# Patient Record
Sex: FEMALE | Race: WHITE | NOT HISPANIC OR LATINO | Employment: OTHER | URBAN - METROPOLITAN AREA
[De-identification: names, ages, dates, MRNs, and addresses within clinical notes are randomized per-mention and may not be internally consistent; named-entity substitution may affect disease eponyms.]

---

## 2020-01-01 ENCOUNTER — TELEPHONE CONVERTED (OUTPATIENT)
Dept: PSYCHIATRY | Facility: CLINIC | Age: 60
End: 2020-01-01
Attending: STUDENT IN AN ORGANIZED HEALTH CARE EDUCATION/TRAINING PROGRAM

## 2020-01-01 ENCOUNTER — HOSPITAL ENCOUNTER (OUTPATIENT)
Dept: GENERAL RADIOLOGY | Facility: HOSPITAL | Age: 60
Discharge: HOME | End: 2020-11-02
Attending: UROLOGY

## 2020-01-01 ENCOUNTER — HOSPITAL ENCOUNTER (OUTPATIENT)
Dept: GENERAL RADIOLOGY | Facility: HOSPITAL | Age: 60
Discharge: HOME | End: 2020-10-19
Attending: UROLOGY

## 2020-01-01 ENCOUNTER — TELEPHONE CONVERTED (OUTPATIENT)
Dept: UROLOGY | Facility: CLINIC | Age: 60
End: 2020-01-01
Attending: UROLOGY

## 2020-01-01 ENCOUNTER — HOSPITAL ENCOUNTER (OUTPATIENT)
Dept: PREADMISSION TESTING | Facility: HOSPITAL | Age: 60
Discharge: HOME | End: 2020-09-30
Attending: UROLOGY

## 2020-01-01 ENCOUNTER — HOSPITAL ENCOUNTER (OUTPATIENT)
Dept: GENERAL RADIOLOGY | Facility: HOSPITAL | Age: 60
Discharge: HOME | End: 2020-10-05
Attending: UROLOGY

## 2020-01-01 ENCOUNTER — OFFICE VISIT CONVERTED (OUTPATIENT)
Dept: FAMILY MEDICINE CLINIC | Facility: CLINIC | Age: 60
End: 2020-01-01
Attending: STUDENT IN AN ORGANIZED HEALTH CARE EDUCATION/TRAINING PROGRAM

## 2020-01-01 ENCOUNTER — HOSPITAL ENCOUNTER (OUTPATIENT)
Dept: GENERAL RADIOLOGY | Facility: HOSPITAL | Age: 60
Discharge: HOME | End: 2020-12-31
Attending: STUDENT IN AN ORGANIZED HEALTH CARE EDUCATION/TRAINING PROGRAM

## 2020-01-01 ENCOUNTER — HOSPITAL ENCOUNTER (OUTPATIENT)
Dept: GENERAL RADIOLOGY | Facility: HOSPITAL | Age: 60
Discharge: HOME | End: 2020-10-26
Attending: UROLOGY

## 2020-01-01 ENCOUNTER — HOSPITAL ENCOUNTER (OUTPATIENT)
Dept: PERIOP | Facility: HOSPITAL | Age: 60
Setting detail: HOSPITAL OUTPATIENT SURGERY
Discharge: HOME | End: 2020-11-04
Attending: UROLOGY

## 2020-01-01 ENCOUNTER — HOSPITAL ENCOUNTER (OUTPATIENT)
Dept: PREADMISSION TESTING | Facility: HOSPITAL | Age: 60
Discharge: HOME | End: 2020-10-30
Attending: UROLOGY

## 2020-01-01 ENCOUNTER — HOSPITAL ENCOUNTER (OUTPATIENT)
Dept: OTHER | Facility: HOSPITAL | Age: 60
Discharge: HOME | End: 2020-10-28
Attending: UROLOGY

## 2020-01-01 ENCOUNTER — OFFICE VISIT CONVERTED (OUTPATIENT)
Dept: UROLOGY | Facility: CLINIC | Age: 60
End: 2020-01-01
Attending: UROLOGY

## 2020-01-01 ENCOUNTER — HOSPITAL ENCOUNTER (OUTPATIENT)
Dept: LAB | Facility: HOSPITAL | Age: 60
Discharge: HOME | End: 2020-09-30
Attending: NURSE PRACTITIONER

## 2020-01-01 ENCOUNTER — HOSPITAL ENCOUNTER (OUTPATIENT)
Dept: GENERAL RADIOLOGY | Facility: HOSPITAL | Age: 60
Discharge: HOME | End: 2020-10-12
Attending: UROLOGY

## 2020-01-01 ENCOUNTER — CONVERSION ENCOUNTER (OUTPATIENT)
Dept: FAMILY MEDICINE CLINIC | Facility: CLINIC | Age: 60
End: 2020-01-01

## 2020-01-01 ENCOUNTER — HOSPITAL ENCOUNTER (OUTPATIENT)
Dept: LAB | Facility: HOSPITAL | Age: 60
Discharge: HOME | End: 2020-12-31
Attending: STUDENT IN AN ORGANIZED HEALTH CARE EDUCATION/TRAINING PROGRAM

## 2020-01-01 LAB
25(OH)D3 SERPL-MCNC: 18.1 NG/ML (ref 30–100)
ALBUMIN SERPL-MCNC: 3.3 G/DL (ref 3.5–5)
ALBUMIN/GLOB SERPL: 1 {RATIO} (ref 1.4–2.6)
ALP SERPL-CCNC: 87 U/L (ref 53–141)
ALT SERPL-CCNC: 8 U/L (ref 10–40)
AMPHETAMINES UR QL SCN: NEGATIVE
ANION GAP SERPL CALC-SCNC: 14 MMOL/L (ref 8–19)
AST SERPL-CCNC: 17 U/L (ref 15–50)
BARBITURATES UR QL SCN: NEGATIVE
BASOPHILS # BLD AUTO: 0.05 10*3/UL (ref 0–0.2)
BASOPHILS # BLD AUTO: 0.06 10*3/UL (ref 0–0.2)
BASOPHILS NFR BLD AUTO: 0.6 % (ref 0–3)
BASOPHILS NFR BLD AUTO: 0.7 % (ref 0–3)
BENZODIAZ UR QL SCN: POSITIVE
BILIRUB SERPL-MCNC: 0.26 MG/DL (ref 0.2–1.3)
BUN SERPL-MCNC: 36 MG/DL (ref 5–25)
BUN/CREAT SERPL: 19 {RATIO} (ref 6–20)
BURR CELLS BLD QL SMEAR: SLIGHT
CALCIUM SERPL-MCNC: 8.9 MG/DL (ref 8.7–10.4)
CHLORIDE SERPL-SCNC: 105 MMOL/L (ref 99–111)
CHOLEST SERPL-MCNC: 111 MG/DL (ref 107–200)
CHOLEST/HDLC SERPL: 2.8 {RATIO} (ref 3–6)
CONV ABS IMM GRAN: 0.05 10*3/UL (ref 0–0.2)
CONV ABS IMM GRAN: 0.06 10*3/UL (ref 0–0.2)
CONV ANISOCYTES: SLIGHT
CONV BASOPHILIC STIPPLING IN BLOOD BY LIGHT MICROSCOPY: NORMAL
CONV CO2: 16 MMOL/L (ref 22–32)
CONV COCAINE, UR: NEGATIVE
CONV HIV-1/ HIV-2: NONREACTIVE
CONV HYPOCHROMIA IN BLOOD BY LIGHT MICROSCOPY: SLIGHT
CONV IMMATURE GRAN: 0.5 % (ref 0–1.8)
CONV IMMATURE GRAN: 0.9 % (ref 0–1.8)
CONV TOTAL PROTEIN: 6.7 G/DL (ref 6.3–8.2)
CREAT UR-MCNC: 1.91 MG/DL (ref 0.5–0.9)
DACRYOCYTES BLD QL SMEAR: NORMAL
DEPRECATED RDW RBC AUTO: 55.6 FL (ref 36.4–46.3)
DEPRECATED RDW RBC AUTO: 73.2 FL (ref 36.4–46.3)
EOSINOPHIL # BLD AUTO: 0.25 10*3/UL (ref 0–0.7)
EOSINOPHIL # BLD AUTO: 0.27 10*3/UL (ref 0–0.7)
EOSINOPHIL # BLD AUTO: 2.6 % (ref 0–7)
EOSINOPHIL # BLD AUTO: 3.9 % (ref 0–7)
ERYTHROCYTE [DISTWIDTH] IN BLOOD BY AUTOMATED COUNT: 16.1 % (ref 11.7–14.4)
ERYTHROCYTE [DISTWIDTH] IN BLOOD BY AUTOMATED COUNT: 20.4 % (ref 11.7–14.4)
FERRITIN SERPL-MCNC: 56 NG/ML (ref 10–200)
FOLATE SERPL-MCNC: 14.2 NG/ML (ref 4.8–20)
GFR SERPLBLD BASED ON 1.73 SQ M-ARVRAT: 28 ML/MIN/{1.73_M2}
GLOBULIN UR ELPH-MCNC: 3.4 G/DL (ref 2–3.5)
GLUCOSE SERPL-MCNC: 92 MG/DL (ref 65–99)
HCT VFR BLD AUTO: 25.8 % (ref 37–47)
HCT VFR BLD AUTO: 28.1 % (ref 37–47)
HDLC SERPL-MCNC: 39 MG/DL (ref 40–60)
HGB BLD-MCNC: 7.9 G/DL (ref 12–16)
HGB BLD-MCNC: 8.7 G/DL (ref 12–16)
IRON SATN MFR SERPL: 7 % (ref 20–55)
IRON SERPL-MCNC: 27 UG/DL (ref 60–170)
LDLC SERPL CALC-MCNC: 50 MG/DL (ref 70–100)
LYMPHOCYTES # BLD AUTO: 1.96 10*3/UL (ref 1–5)
LYMPHOCYTES # BLD AUTO: 2.14 10*3/UL (ref 1–5)
LYMPHOCYTES NFR BLD AUTO: 22.4 % (ref 20–45)
LYMPHOCYTES NFR BLD AUTO: 28.2 % (ref 20–45)
MACROCYTES BLD QL SMEAR: SLIGHT
MCH RBC QN AUTO: 28.8 PG (ref 27–31)
MCH RBC QN AUTO: 30.2 PG (ref 27–31)
MCHC RBC AUTO-ENTMCNC: 30.6 G/DL (ref 33–37)
MCHC RBC AUTO-ENTMCNC: 31 G/DL (ref 33–37)
MCV RBC AUTO: 94.2 FL (ref 81–99)
MCV RBC AUTO: 97.6 FL (ref 81–99)
METHADONE UR QL SCN: NEGATIVE
MICROCYTES BLD QL: SLIGHT
MONOCYTES # BLD AUTO: 0.74 10*3/UL (ref 0.2–1.2)
MONOCYTES # BLD AUTO: 1.16 10*3/UL (ref 0.2–1.2)
MONOCYTES NFR BLD AUTO: 10.7 % (ref 3–10)
MONOCYTES NFR BLD AUTO: 12.1 % (ref 3–10)
NEUTROPHILS # BLD AUTO: 3.86 10*3/UL (ref 2–8)
NEUTROPHILS # BLD AUTO: 5.9 10*3/UL (ref 2–8)
NEUTROPHILS NFR BLD AUTO: 55.6 % (ref 30–85)
NEUTROPHILS NFR BLD AUTO: 61.8 % (ref 30–85)
NRBC CBCN: 0 % (ref 0–0.7)
NRBC CBCN: 0 % (ref 0–0.7)
OPIATES TESTED UR SCN: POSITIVE
OSMOLALITY SERPL CALC.SUM OF ELEC: 280 MOSM/KG (ref 273–304)
OVALOCYTES BLD QL SMEAR: SLIGHT
OXYCODONE UR QL SCN: NEGATIVE
PCP UR QL: NEGATIVE
PLATELET # BLD AUTO: 183 10*3/UL (ref 130–400)
PLATELET # BLD AUTO: 281 10*3/UL (ref 130–400)
PMV BLD AUTO: 10.7 FL (ref 9.4–12.3)
PMV BLD AUTO: 9.3 FL (ref 9.4–12.3)
POIKILOCYTOSIS BLD QL SMEAR: SLIGHT
POLYCHROMASIA BLD QL SMEAR: NORMAL
POTASSIUM SERPL-SCNC: 3.6 MMOL/L (ref 3.5–5.3)
RBC # BLD AUTO: 2.74 10*6/UL (ref 4.2–5.4)
RBC # BLD AUTO: 2.88 10*6/UL (ref 4.2–5.4)
SARS-COV-2 RNA SPEC QL NAA+PROBE: NOT DETECTED
SARS-COV-2 RNA SPEC QL NAA+PROBE: NOT DETECTED
SODIUM SERPL-SCNC: 131 MMOL/L (ref 135–147)
TARGETS BLD QL SMEAR: NORMAL
THC SERPLBLD CFM-MCNC: NEGATIVE NG/ML
TIBC SERPL-MCNC: 403 UG/DL (ref 245–450)
TRANSFERRIN SERPL-MCNC: 282 MG/DL (ref 250–380)
TRIGL SERPL-MCNC: 112 MG/DL (ref 40–150)
TSH SERPL-ACNC: 3.34 M[IU]/L (ref 0.27–4.2)
VIT B12 SERPL-MCNC: 685 PG/ML (ref 211–911)
VLDLC SERPL-MCNC: 22 MG/DL (ref 5–37)
WBC # BLD AUTO: 6.94 10*3/UL (ref 4.8–10.8)
WBC # BLD AUTO: 9.56 10*3/UL (ref 4.8–10.8)

## 2020-08-20 ENCOUNTER — CONVERSION ENCOUNTER (OUTPATIENT)
Dept: FAMILY MEDICINE CLINIC | Facility: CLINIC | Age: 60
End: 2020-08-20

## 2020-08-20 ENCOUNTER — OFFICE VISIT CONVERTED (OUTPATIENT)
Dept: FAMILY MEDICINE CLINIC | Facility: CLINIC | Age: 60
End: 2020-08-20
Attending: NURSE PRACTITIONER

## 2020-09-01 ENCOUNTER — OFFICE VISIT CONVERTED (OUTPATIENT)
Dept: UROLOGY | Facility: CLINIC | Age: 60
End: 2020-09-01
Attending: NURSE PRACTITIONER

## 2020-09-02 ENCOUNTER — HOSPITAL ENCOUNTER (OUTPATIENT)
Dept: GENERAL RADIOLOGY | Facility: HOSPITAL | Age: 60
Discharge: HOME | End: 2020-09-02
Attending: INTERNAL MEDICINE

## 2020-09-02 ENCOUNTER — HOSPITAL ENCOUNTER (OUTPATIENT)
Dept: LAB | Facility: HOSPITAL | Age: 60
Discharge: HOME | End: 2020-09-02
Attending: NURSE PRACTITIONER

## 2020-09-02 LAB
25(OH)D3 SERPL-MCNC: 16 NG/ML (ref 30–100)
ALBUMIN SERPL-MCNC: 3.5 G/DL (ref 3.5–5)
ALBUMIN/GLOB SERPL: 1.4 {RATIO} (ref 1.4–2.6)
ALP SERPL-CCNC: 76 U/L (ref 53–141)
ALT SERPL-CCNC: <5 U/L (ref 10–40)
ANION GAP SERPL CALC-SCNC: 13 MMOL/L (ref 8–19)
AST SERPL-CCNC: 19 U/L (ref 15–50)
BASOPHILS # BLD AUTO: 0.04 10*3/UL (ref 0–0.2)
BASOPHILS NFR BLD AUTO: 0.7 % (ref 0–3)
BILIRUB SERPL-MCNC: 0.25 MG/DL (ref 0.2–1.3)
BUN SERPL-MCNC: 30 MG/DL (ref 5–25)
BUN/CREAT SERPL: 18 {RATIO} (ref 6–20)
CALCIUM SERPL-MCNC: 9.1 MG/DL (ref 8.7–10.4)
CHLORIDE SERPL-SCNC: 111 MMOL/L (ref 99–111)
CHOLEST SERPL-MCNC: 105 MG/DL (ref 107–200)
CHOLEST/HDLC SERPL: 2.6 {RATIO} (ref 3–6)
CONV ABS IMM GRAN: 0.05 10*3/UL (ref 0–0.2)
CONV CO2: 18 MMOL/L (ref 22–32)
CONV IMMATURE GRAN: 0.9 % (ref 0–1.8)
CONV TOTAL PROTEIN: 6 G/DL (ref 6.3–8.2)
CREAT UR-MCNC: 1.64 MG/DL (ref 0.5–0.9)
DEPRECATED RDW RBC AUTO: 54.1 FL (ref 36.4–46.3)
EOSINOPHIL # BLD AUTO: 0.25 10*3/UL (ref 0–0.7)
EOSINOPHIL # BLD AUTO: 4.5 % (ref 0–7)
ERYTHROCYTE [DISTWIDTH] IN BLOOD BY AUTOMATED COUNT: 14.9 % (ref 11.7–14.4)
GFR SERPLBLD BASED ON 1.73 SQ M-ARVRAT: 34 ML/MIN/{1.73_M2}
GLOBULIN UR ELPH-MCNC: 2.5 G/DL (ref 2–3.5)
GLUCOSE SERPL-MCNC: 85 MG/DL (ref 65–99)
HCT VFR BLD AUTO: 27.6 % (ref 37–47)
HDLC SERPL-MCNC: 41 MG/DL (ref 40–60)
HGB BLD-MCNC: 8.3 G/DL (ref 12–16)
LDLC SERPL CALC-MCNC: 50 MG/DL (ref 70–100)
LYMPHOCYTES # BLD AUTO: 1.8 10*3/UL (ref 1–5)
LYMPHOCYTES NFR BLD AUTO: 32.5 % (ref 20–45)
MCH RBC QN AUTO: 29.4 PG (ref 27–31)
MCHC RBC AUTO-ENTMCNC: 30.1 G/DL (ref 33–37)
MCV RBC AUTO: 97.9 FL (ref 81–99)
MONOCYTES # BLD AUTO: 0.66 10*3/UL (ref 0.2–1.2)
MONOCYTES NFR BLD AUTO: 11.9 % (ref 3–10)
NEUTROPHILS # BLD AUTO: 2.74 10*3/UL (ref 2–8)
NEUTROPHILS NFR BLD AUTO: 49.5 % (ref 30–85)
NRBC CBCN: 0 % (ref 0–0.7)
OSMOLALITY SERPL CALC.SUM OF ELEC: 293 MOSM/KG (ref 273–304)
PLATELET # BLD AUTO: 174 10*3/UL (ref 130–400)
PMV BLD AUTO: 10.8 FL (ref 9.4–12.3)
POTASSIUM SERPL-SCNC: 3.4 MMOL/L (ref 3.5–5.3)
RBC # BLD AUTO: 2.82 10*6/UL (ref 4.2–5.4)
SODIUM SERPL-SCNC: 139 MMOL/L (ref 135–147)
TRIGL SERPL-MCNC: 68 MG/DL (ref 40–150)
TSH SERPL-ACNC: 2.89 M[IU]/L (ref 0.27–4.2)
VLDLC SERPL-MCNC: 14 MG/DL (ref 5–37)
WBC # BLD AUTO: 5.54 10*3/UL (ref 4.8–10.8)

## 2020-09-03 ENCOUNTER — OFFICE VISIT CONVERTED (OUTPATIENT)
Dept: FAMILY MEDICINE CLINIC | Facility: CLINIC | Age: 60
End: 2020-09-03
Attending: NURSE PRACTITIONER

## 2020-09-03 ENCOUNTER — CONVERSION ENCOUNTER (OUTPATIENT)
Dept: FAMILY MEDICINE CLINIC | Facility: CLINIC | Age: 60
End: 2020-09-03

## 2020-09-03 LAB
EST. AVERAGE GLUCOSE BLD GHB EST-MCNC: 77 MG/DL
FOLATE SERPL-MCNC: 9.9 NG/ML (ref 4.8–20)
HBA1C MFR BLD: <4.3 % (ref 3.5–5.7)
VIT B12 SERPL-MCNC: 413 PG/ML (ref 211–911)

## 2020-09-04 LAB
FERRITIN SERPL-MCNC: 20 NG/ML (ref 10–200)
IRON SATN MFR SERPL: 11 % (ref 20–55)
IRON SERPL-MCNC: 54 UG/DL (ref 60–170)
TIBC SERPL-MCNC: 508 UG/DL (ref 245–450)
TRANSFERRIN SERPL-MCNC: 355 MG/DL (ref 250–380)

## 2020-09-10 ENCOUNTER — HOSPITAL ENCOUNTER (OUTPATIENT)
Dept: INFUSION THERAPY | Facility: HOSPITAL | Age: 60
Setting detail: RECURRING SERIES
Discharge: HOME | End: 2020-12-09
Attending: NURSE PRACTITIONER

## 2020-09-15 ENCOUNTER — HOSPITAL ENCOUNTER (OUTPATIENT)
Dept: CT IMAGING | Facility: HOSPITAL | Age: 60
Discharge: HOME | End: 2020-09-15
Attending: NURSE PRACTITIONER

## 2020-09-15 LAB
CREAT BLD-MCNC: 1.5 MG/DL (ref 0.6–1.4)
GFR SERPLBLD BASED ON 1.73 SQ M-ARVRAT: 38 ML/MIN/{1.73_M2}

## 2020-09-18 ENCOUNTER — OFFICE VISIT CONVERTED (OUTPATIENT)
Dept: UROLOGY | Facility: CLINIC | Age: 60
End: 2020-09-18
Attending: UROLOGY

## 2020-09-18 ENCOUNTER — HOSPITAL ENCOUNTER (OUTPATIENT)
Dept: SURGERY | Facility: CLINIC | Age: 60
Discharge: HOME | End: 2020-09-18
Attending: UROLOGY

## 2020-09-20 LAB — BACTERIA UR CULT: NORMAL

## 2021-01-01 ENCOUNTER — TELEMEDICINE CONVERTED (OUTPATIENT)
Dept: PSYCHIATRY | Facility: CLINIC | Age: 61
End: 2021-01-01
Attending: STUDENT IN AN ORGANIZED HEALTH CARE EDUCATION/TRAINING PROGRAM

## 2021-01-01 ENCOUNTER — ANESTHESIA EVENT (OUTPATIENT)
Dept: PERIOP | Facility: HOSPITAL | Age: 61
End: 2021-01-01

## 2021-01-01 ENCOUNTER — TELEPHONE (OUTPATIENT)
Dept: UROLOGY | Facility: CLINIC | Age: 61
End: 2021-01-01

## 2021-01-01 ENCOUNTER — TRANSCRIBE ORDERS (OUTPATIENT)
Dept: ADMINISTRATIVE | Facility: HOSPITAL | Age: 61
End: 2021-01-01

## 2021-01-01 ENCOUNTER — OFFICE VISIT CONVERTED (OUTPATIENT)
Dept: FAMILY MEDICINE CLINIC | Facility: CLINIC | Age: 61
End: 2021-01-01
Attending: STUDENT IN AN ORGANIZED HEALTH CARE EDUCATION/TRAINING PROGRAM

## 2021-01-01 ENCOUNTER — HOSPITAL ENCOUNTER (INPATIENT)
Facility: HOSPITAL | Age: 61
LOS: 10 days | End: 2021-09-22
Attending: EMERGENCY MEDICINE | Admitting: INTERNAL MEDICINE

## 2021-01-01 ENCOUNTER — ANESTHESIA (OUTPATIENT)
Dept: PERIOP | Facility: HOSPITAL | Age: 61
End: 2021-01-01

## 2021-01-01 ENCOUNTER — TELEPHONE CONVERTED (OUTPATIENT)
Dept: UROLOGY | Facility: CLINIC | Age: 61
End: 2021-01-01
Attending: UROLOGY

## 2021-01-01 ENCOUNTER — HOSPITAL ENCOUNTER (OUTPATIENT)
Dept: PREADMISSION TESTING | Facility: HOSPITAL | Age: 61
Discharge: HOME | End: 2021-02-04
Attending: UROLOGY

## 2021-01-01 ENCOUNTER — APPOINTMENT (OUTPATIENT)
Dept: GENERAL RADIOLOGY | Facility: HOSPITAL | Age: 61
End: 2021-01-01

## 2021-01-01 ENCOUNTER — TELEPHONE (OUTPATIENT)
Dept: FAMILY MEDICINE CLINIC | Facility: CLINIC | Age: 61
End: 2021-01-01

## 2021-01-01 ENCOUNTER — APPOINTMENT (OUTPATIENT)
Dept: CT IMAGING | Facility: HOSPITAL | Age: 61
End: 2021-01-01

## 2021-01-01 ENCOUNTER — OFFICE VISIT CONVERTED (OUTPATIENT)
Dept: UROLOGY | Facility: CLINIC | Age: 61
End: 2021-01-01
Attending: UROLOGY

## 2021-01-01 ENCOUNTER — ANESTHESIA (OUTPATIENT)
Dept: ICU | Facility: HOSPITAL | Age: 61
End: 2021-01-01

## 2021-01-01 ENCOUNTER — HOSPITAL ENCOUNTER (OUTPATIENT)
Dept: PREADMISSION TESTING | Facility: HOSPITAL | Age: 61
Discharge: HOME | End: 2021-02-19
Attending: UROLOGY

## 2021-01-01 ENCOUNTER — TRANSCRIBE ORDERS (OUTPATIENT)
Dept: LAB | Facility: HOSPITAL | Age: 61
End: 2021-01-01

## 2021-01-01 ENCOUNTER — OFFICE VISIT (OUTPATIENT)
Dept: FAMILY MEDICINE CLINIC | Facility: CLINIC | Age: 61
End: 2021-01-01

## 2021-01-01 ENCOUNTER — TRANSITIONAL CARE MANAGEMENT TELEPHONE ENCOUNTER (OUTPATIENT)
Dept: CALL CENTER | Facility: HOSPITAL | Age: 61
End: 2021-01-01

## 2021-01-01 ENCOUNTER — HOSPITAL ENCOUNTER (OUTPATIENT)
Dept: PREADMISSION TESTING | Facility: HOSPITAL | Age: 61
Discharge: HOME | End: 2021-02-06
Attending: UROLOGY

## 2021-01-01 ENCOUNTER — HOSPITAL ENCOUNTER (OUTPATIENT)
Facility: HOSPITAL | Age: 61
Setting detail: OBSERVATION
Discharge: HOME | End: 2021-07-28
Attending: EMERGENCY MEDICINE | Admitting: INTERNAL MEDICINE

## 2021-01-01 ENCOUNTER — HOSPITAL ENCOUNTER (OUTPATIENT)
Dept: PERIOP | Facility: HOSPITAL | Age: 61
Setting detail: HOSPITAL OUTPATIENT SURGERY
Discharge: HOME | End: 2021-02-24
Attending: UROLOGY

## 2021-01-01 ENCOUNTER — APPOINTMENT (OUTPATIENT)
Dept: BONE DENSITY | Facility: HOSPITAL | Age: 61
End: 2021-01-01

## 2021-01-01 ENCOUNTER — HOSPITAL ENCOUNTER (OUTPATIENT)
Dept: SURGERY | Facility: CLINIC | Age: 61
Discharge: HOME | End: 2021-01-25
Attending: UROLOGY

## 2021-01-01 ENCOUNTER — HOSPITAL ENCOUNTER (OUTPATIENT)
Dept: PREADMISSION TESTING | Facility: HOSPITAL | Age: 61
Discharge: HOME | End: 2021-05-24
Attending: UROLOGY

## 2021-01-01 ENCOUNTER — ANESTHESIA EVENT (OUTPATIENT)
Dept: ICU | Facility: HOSPITAL | Age: 61
End: 2021-01-01

## 2021-01-01 ENCOUNTER — HOSPITAL ENCOUNTER (OUTPATIENT)
Dept: URGENT CARE | Facility: CLINIC | Age: 61
Discharge: HOME | End: 2021-04-29
Attending: PHYSICIAN ASSISTANT

## 2021-01-01 ENCOUNTER — HOSPITAL ENCOUNTER (OUTPATIENT)
Facility: HOSPITAL | Age: 61
Setting detail: HOSPITAL OUTPATIENT SURGERY
Discharge: HOME | End: 2021-06-23
Attending: UROLOGY | Admitting: UROLOGY

## 2021-01-01 ENCOUNTER — LAB (OUTPATIENT)
Dept: LAB | Facility: HOSPITAL | Age: 61
End: 2021-01-01

## 2021-01-01 ENCOUNTER — OFFICE VISIT (OUTPATIENT)
Dept: PSYCHIATRY | Facility: CLINIC | Age: 61
End: 2021-01-01

## 2021-01-01 ENCOUNTER — READMISSION MANAGEMENT (OUTPATIENT)
Dept: CALL CENTER | Facility: HOSPITAL | Age: 61
End: 2021-01-01

## 2021-01-01 ENCOUNTER — HOSPITAL ENCOUNTER (OUTPATIENT)
Dept: OTHER | Facility: HOSPITAL | Age: 61
Discharge: HOME | End: 2021-05-24
Attending: UROLOGY

## 2021-01-01 VITALS
SYSTOLIC BLOOD PRESSURE: 98 MMHG | HEIGHT: 64 IN | RESPIRATION RATE: 20 BRPM | TEMPERATURE: 95.9 F | BODY MASS INDEX: 19.04 KG/M2 | OXYGEN SATURATION: 98 % | DIASTOLIC BLOOD PRESSURE: 50 MMHG | HEART RATE: 94 BPM | WEIGHT: 111.5 LBS

## 2021-01-01 VITALS
DIASTOLIC BLOOD PRESSURE: 44 MMHG | SYSTOLIC BLOOD PRESSURE: 107 MMHG | HEART RATE: 80 BPM | HEIGHT: 64 IN | BODY MASS INDEX: 18.17 KG/M2 | WEIGHT: 106.44 LBS | OXYGEN SATURATION: 97 % | TEMPERATURE: 97 F

## 2021-01-01 VITALS
HEART RATE: 93 BPM | WEIGHT: 116 LBS | DIASTOLIC BLOOD PRESSURE: 57 MMHG | BODY MASS INDEX: 19.81 KG/M2 | OXYGEN SATURATION: 95 % | SYSTOLIC BLOOD PRESSURE: 93 MMHG | TEMPERATURE: 96 F | HEIGHT: 64 IN

## 2021-01-01 VITALS — BODY MASS INDEX: 18.69 KG/M2 | WEIGHT: 109.5 LBS | RESPIRATION RATE: 15 BRPM | HEIGHT: 64 IN

## 2021-01-01 VITALS
WEIGHT: 129 LBS | HEIGHT: 64 IN | BODY MASS INDEX: 22.02 KG/M2 | SYSTOLIC BLOOD PRESSURE: 112 MMHG | DIASTOLIC BLOOD PRESSURE: 50 MMHG

## 2021-01-01 VITALS
BODY MASS INDEX: 20.03 KG/M2 | HEIGHT: 64 IN | SYSTOLIC BLOOD PRESSURE: 110 MMHG | WEIGHT: 117.31 LBS | OXYGEN SATURATION: 96 % | RESPIRATION RATE: 16 BRPM | DIASTOLIC BLOOD PRESSURE: 50 MMHG | TEMPERATURE: 96 F | HEART RATE: 82 BPM

## 2021-01-01 VITALS
HEIGHT: 64 IN | OXYGEN SATURATION: 98 % | RESPIRATION RATE: 16 BRPM | BODY MASS INDEX: 22.13 KG/M2 | HEART RATE: 68 BPM | WEIGHT: 129.63 LBS | SYSTOLIC BLOOD PRESSURE: 91 MMHG | TEMPERATURE: 97.4 F | DIASTOLIC BLOOD PRESSURE: 46 MMHG

## 2021-01-01 VITALS
WEIGHT: 134.04 LBS | HEIGHT: 64 IN | RESPIRATION RATE: 18 BRPM | TEMPERATURE: 98.2 F | HEART RATE: 91 BPM | BODY MASS INDEX: 22.88 KG/M2 | OXYGEN SATURATION: 99 % | SYSTOLIC BLOOD PRESSURE: 121 MMHG | DIASTOLIC BLOOD PRESSURE: 60 MMHG

## 2021-01-01 VITALS
BODY MASS INDEX: 18.53 KG/M2 | SYSTOLIC BLOOD PRESSURE: 95 MMHG | TEMPERATURE: 96.5 F | HEIGHT: 64 IN | WEIGHT: 108.56 LBS | OXYGEN SATURATION: 91 % | DIASTOLIC BLOOD PRESSURE: 56 MMHG

## 2021-01-01 VITALS
TEMPERATURE: 97.6 F | BODY MASS INDEX: 20.83 KG/M2 | HEART RATE: 76 BPM | HEIGHT: 64 IN | DIASTOLIC BLOOD PRESSURE: 54 MMHG | RESPIRATION RATE: 16 BRPM | OXYGEN SATURATION: 96 % | SYSTOLIC BLOOD PRESSURE: 98 MMHG | WEIGHT: 122 LBS

## 2021-01-01 VITALS
HEART RATE: 83 BPM | SYSTOLIC BLOOD PRESSURE: 100 MMHG | OXYGEN SATURATION: 100 % | WEIGHT: 113.12 LBS | RESPIRATION RATE: 20 BRPM | HEIGHT: 64 IN | DIASTOLIC BLOOD PRESSURE: 42 MMHG | BODY MASS INDEX: 19.31 KG/M2

## 2021-01-01 VITALS
DIASTOLIC BLOOD PRESSURE: 60 MMHG | HEIGHT: 64 IN | TEMPERATURE: 97.6 F | WEIGHT: 127 LBS | RESPIRATION RATE: 16 BRPM | BODY MASS INDEX: 21.74 KG/M2 | WEIGHT: 127.31 LBS | HEIGHT: 64 IN | OXYGEN SATURATION: 96 % | HEART RATE: 88 BPM | SYSTOLIC BLOOD PRESSURE: 118 MMHG | BODY MASS INDEX: 21.68 KG/M2 | RESPIRATION RATE: 17 BRPM

## 2021-01-01 VITALS
OXYGEN SATURATION: 97 % | HEART RATE: 83 BPM | DIASTOLIC BLOOD PRESSURE: 49 MMHG | BODY MASS INDEX: 18.1 KG/M2 | TEMPERATURE: 95.7 F | SYSTOLIC BLOOD PRESSURE: 87 MMHG | WEIGHT: 106 LBS | HEIGHT: 64 IN

## 2021-01-01 VITALS — HEIGHT: 64 IN | BODY MASS INDEX: 18.44 KG/M2 | RESPIRATION RATE: 18 BRPM | WEIGHT: 108 LBS

## 2021-01-01 VITALS
BODY MASS INDEX: 22.51 KG/M2 | WEIGHT: 131.84 LBS | HEART RATE: 104 BPM | OXYGEN SATURATION: 59 % | HEIGHT: 64 IN | TEMPERATURE: 96.6 F

## 2021-01-01 DIAGNOSIS — R26.2 DIFFICULTY WALKING: ICD-10-CM

## 2021-01-01 DIAGNOSIS — E86.0 DEHYDRATION: ICD-10-CM

## 2021-01-01 DIAGNOSIS — F17.200 SMOKER: ICD-10-CM

## 2021-01-01 DIAGNOSIS — D64.9 ANEMIA, UNSPECIFIED TYPE: ICD-10-CM

## 2021-01-01 DIAGNOSIS — K43.5 PARASTOMAL HERNIA WITHOUT OBSTRUCTION OR GANGRENE: ICD-10-CM

## 2021-01-01 DIAGNOSIS — Z78.0 POST-MENOPAUSAL: ICD-10-CM

## 2021-01-01 DIAGNOSIS — Z98.890 HISTORY OF UROSTOMY: ICD-10-CM

## 2021-01-01 DIAGNOSIS — A41.9 SEPTIC SHOCK (HCC): ICD-10-CM

## 2021-01-01 DIAGNOSIS — R65.21 SEPTIC SHOCK (HCC): ICD-10-CM

## 2021-01-01 DIAGNOSIS — F41.1 GENERALIZED ANXIETY DISORDER: ICD-10-CM

## 2021-01-01 DIAGNOSIS — F41.1 GENERALIZED ANXIETY DISORDER: Primary | ICD-10-CM

## 2021-01-01 DIAGNOSIS — F17.200 CURRENT SMOKER: Primary | ICD-10-CM

## 2021-01-01 DIAGNOSIS — N18.32 STAGE 3B CHRONIC KIDNEY DISEASE (HCC): ICD-10-CM

## 2021-01-01 DIAGNOSIS — Z74.09 DECLINING MOBILITY: ICD-10-CM

## 2021-01-01 DIAGNOSIS — Z78.9 DECREASED ACTIVITIES OF DAILY LIVING (ADL): ICD-10-CM

## 2021-01-01 DIAGNOSIS — R29.6 FREQUENT FALLS: ICD-10-CM

## 2021-01-01 DIAGNOSIS — Z01.818 PREOP TESTING: Primary | ICD-10-CM

## 2021-01-01 DIAGNOSIS — N30.00 ACUTE CYSTITIS WITHOUT HEMATURIA: Primary | ICD-10-CM

## 2021-01-01 DIAGNOSIS — N30.00 ACUTE CYSTITIS WITHOUT HEMATURIA: ICD-10-CM

## 2021-01-01 DIAGNOSIS — E87.29 METABOLIC ACIDOSIS, INCREASED ANION GAP (IAG): ICD-10-CM

## 2021-01-01 DIAGNOSIS — F51.05 INSOMNIA DUE TO MENTAL CONDITION: Primary | ICD-10-CM

## 2021-01-01 DIAGNOSIS — R42 VERTIGO: ICD-10-CM

## 2021-01-01 DIAGNOSIS — F33.1 MAJOR DEPRESSIVE DISORDER, RECURRENT EPISODE, MODERATE (HCC): Primary | ICD-10-CM

## 2021-01-01 DIAGNOSIS — R41.0 CONFUSION: ICD-10-CM

## 2021-01-01 DIAGNOSIS — N17.9 ACUTE KIDNEY INJURY (HCC): ICD-10-CM

## 2021-01-01 DIAGNOSIS — E87.5 HYPERKALEMIA: ICD-10-CM

## 2021-01-01 DIAGNOSIS — N39.0 URINARY TRACT INFECTION WITHOUT HEMATURIA, SITE UNSPECIFIED: Primary | ICD-10-CM

## 2021-01-01 DIAGNOSIS — N28.9 RENAL INSUFFICIENCY: ICD-10-CM

## 2021-01-01 DIAGNOSIS — F51.05 INSOMNIA DUE TO MENTAL CONDITION: ICD-10-CM

## 2021-01-01 DIAGNOSIS — R26.9 GAIT ABNORMALITY: ICD-10-CM

## 2021-01-01 DIAGNOSIS — E87.1 HYPONATREMIA: ICD-10-CM

## 2021-01-01 DIAGNOSIS — J44.9 CHRONIC OBSTRUCTIVE PULMONARY DISEASE, UNSPECIFIED COPD TYPE (HCC): ICD-10-CM

## 2021-01-01 DIAGNOSIS — F33.1 MAJOR DEPRESSIVE DISORDER, RECURRENT EPISODE, MODERATE (HCC): ICD-10-CM

## 2021-01-01 DIAGNOSIS — N39.0 URINARY TRACT INFECTION IN FEMALE: Primary | ICD-10-CM

## 2021-01-01 DIAGNOSIS — Z01.818 PREOP TESTING: ICD-10-CM

## 2021-01-01 DIAGNOSIS — R53.1 GENERALIZED WEAKNESS: ICD-10-CM

## 2021-01-01 DIAGNOSIS — K56.609 SMALL BOWEL OBSTRUCTION (HCC): ICD-10-CM

## 2021-01-01 LAB
027 TOXIN: NORMAL
ALBUMIN SERPL-MCNC: 1.6 G/DL (ref 3.5–5.2)
ALBUMIN SERPL-MCNC: 1.7 G/DL (ref 3.5–5.2)
ALBUMIN SERPL-MCNC: 1.9 G/DL (ref 3.5–5.2)
ALBUMIN SERPL-MCNC: 1.9 G/DL (ref 3.5–5.2)
ALBUMIN SERPL-MCNC: 2.3 G/DL (ref 3.5–5.2)
ALBUMIN SERPL-MCNC: 2.3 G/DL (ref 3.5–5.2)
ALBUMIN SERPL-MCNC: 2.4 G/DL (ref 3.5–5.2)
ALBUMIN SERPL-MCNC: 2.5 G/DL (ref 3.5–5.2)
ALBUMIN SERPL-MCNC: 2.6 G/DL (ref 3.5–5.2)
ALBUMIN SERPL-MCNC: 2.8 G/DL (ref 3.5–5.2)
ALBUMIN SERPL-MCNC: 3.4 G/DL (ref 3.5–5.2)
ALBUMIN SERPL-MCNC: 4.1 G/DL (ref 3.5–5.2)
ALBUMIN/GLOB SERPL: 0.5 G/DL
ALBUMIN/GLOB SERPL: 0.6 G/DL
ALBUMIN/GLOB SERPL: 0.6 G/DL
ALBUMIN/GLOB SERPL: 0.7 G/DL
ALBUMIN/GLOB SERPL: 0.8 G/DL
ALBUMIN/GLOB SERPL: 0.9 G/DL
ALBUMIN/GLOB SERPL: 0.9 G/DL
ALP SERPL-CCNC: 101 U/L (ref 39–117)
ALP SERPL-CCNC: 111 U/L (ref 39–117)
ALP SERPL-CCNC: 114 U/L (ref 39–117)
ALP SERPL-CCNC: 126 U/L (ref 39–117)
ALP SERPL-CCNC: 162 U/L (ref 39–117)
ALP SERPL-CCNC: 64 U/L (ref 39–117)
ALP SERPL-CCNC: 70 U/L (ref 39–117)
ALP SERPL-CCNC: 74 U/L (ref 39–117)
ALP SERPL-CCNC: 78 U/L (ref 39–117)
ALP SERPL-CCNC: 90 U/L (ref 39–117)
ALP SERPL-CCNC: 96 U/L (ref 39–117)
ALP SERPL-CCNC: 97 U/L (ref 39–117)
ALT SERPL W P-5'-P-CCNC: 10 U/L (ref 1–33)
ALT SERPL W P-5'-P-CCNC: 11 U/L (ref 1–33)
ALT SERPL W P-5'-P-CCNC: 14 U/L (ref 1–33)
ALT SERPL W P-5'-P-CCNC: 14 U/L (ref 1–33)
ALT SERPL W P-5'-P-CCNC: 15 U/L (ref 1–33)
ALT SERPL W P-5'-P-CCNC: 17 U/L (ref 1–33)
ALT SERPL W P-5'-P-CCNC: 18 U/L (ref 1–33)
ALT SERPL W P-5'-P-CCNC: 21 U/L (ref 1–33)
ALT SERPL W P-5'-P-CCNC: 21 U/L (ref 1–33)
ALT SERPL W P-5'-P-CCNC: 9 U/L (ref 1–33)
AMORPH URATE CRY URNS QL MICRO: ABNORMAL /HPF
AMPICILLIN SUSC ISLT: 16
AMPICILLIN SUSC ISLT: <=0.25
AMPICILLIN SUSC ISLT: >=32
AMPICILLIN+SULBAC SUSC ISLT: 8
AMPICILLIN+SULBAC SUSC ISLT: >=32
ANION GAP SERPL CALCULATED.3IONS-SCNC: 10.1 MMOL/L (ref 5–15)
ANION GAP SERPL CALCULATED.3IONS-SCNC: 11.8 MMOL/L (ref 5–15)
ANION GAP SERPL CALCULATED.3IONS-SCNC: 12.2 MMOL/L (ref 5–15)
ANION GAP SERPL CALCULATED.3IONS-SCNC: 12.8 MMOL/L (ref 5–15)
ANION GAP SERPL CALCULATED.3IONS-SCNC: 13 MMOL/L (ref 5–15)
ANION GAP SERPL CALCULATED.3IONS-SCNC: 13.4 MMOL/L (ref 5–15)
ANION GAP SERPL CALCULATED.3IONS-SCNC: 13.4 MMOL/L (ref 5–15)
ANION GAP SERPL CALCULATED.3IONS-SCNC: 13.9 MMOL/L (ref 5–15)
ANION GAP SERPL CALCULATED.3IONS-SCNC: 14.3 MMOL/L (ref 5–15)
ANION GAP SERPL CALCULATED.3IONS-SCNC: 14.6 MMOL/L (ref 5–15)
ANION GAP SERPL CALCULATED.3IONS-SCNC: 14.8 MMOL/L (ref 5–15)
ANION GAP SERPL CALCULATED.3IONS-SCNC: 16 MMOL/L (ref 5–15)
ANION GAP SERPL CALCULATED.3IONS-SCNC: 16.6 MMOL/L (ref 5–15)
ANION GAP SERPL CALCULATED.3IONS-SCNC: 17.6 MMOL/L (ref 5–15)
ANION GAP SERPL CALCULATED.3IONS-SCNC: 21 MMOL/L (ref 5–15)
ANION GAP SERPL CALCULATED.3IONS-SCNC: 9.8 MMOL/L (ref 5–15)
ANISOCYTOSIS BLD QL: ABNORMAL
ANISOCYTOSIS BLD QL: NORMAL
ARTERIAL PATENCY WRIST A: ABNORMAL
ARTERIAL PATENCY WRIST A: ABNORMAL
ARTERIAL PATENCY WRIST A: POSITIVE
AST SERPL-CCNC: 15 U/L (ref 1–32)
AST SERPL-CCNC: 16 U/L (ref 1–32)
AST SERPL-CCNC: 22 U/L (ref 1–32)
AST SERPL-CCNC: 22 U/L (ref 1–32)
AST SERPL-CCNC: 26 U/L (ref 1–32)
AST SERPL-CCNC: 30 U/L (ref 1–32)
AST SERPL-CCNC: 36 U/L (ref 1–32)
AST SERPL-CCNC: 48 U/L (ref 1–32)
AST SERPL-CCNC: 52 U/L (ref 1–32)
AST SERPL-CCNC: 55 U/L (ref 1–32)
AST SERPL-CCNC: 55 U/L (ref 1–32)
AST SERPL-CCNC: 63 U/L (ref 1–32)
BACTERIA SPEC AEROBE CULT: ABNORMAL
BACTERIA SPEC AEROBE CULT: ABNORMAL
BACTERIA SPEC AEROBE CULT: NORMAL
BACTERIA UR CULT: ABNORMAL
BACTERIA UR QL AUTO: ABNORMAL /HPF
BACTERIA UR QL AUTO: ABNORMAL /HPF
BASE EXCESS BLDA CALC-SCNC: -10.3 MMOL/L (ref -2–2)
BASE EXCESS BLDA CALC-SCNC: -17.5 MMOL/L (ref -2–2)
BASE EXCESS BLDA CALC-SCNC: -19.3 MMOL/L (ref -2–2)
BASOPHILS # BLD AUTO: 0.01 10*3/MM3 (ref 0–0.2)
BASOPHILS # BLD AUTO: 0.03 10*3/MM3 (ref 0–0.2)
BASOPHILS # BLD AUTO: 0.04 10*3/MM3 (ref 0–0.2)
BASOPHILS # BLD AUTO: 0.05 10*3/MM3 (ref 0–0.2)
BASOPHILS # BLD AUTO: 0.06 10*3/MM3 (ref 0–0.2)
BASOPHILS # BLD AUTO: 0.09 10*3/MM3 (ref 0–0.2)
BASOPHILS # BLD AUTO: 0.11 10*3/MM3 (ref 0–0.2)
BASOPHILS NFR BLD AUTO: 0.1 % (ref 0–1.5)
BASOPHILS NFR BLD AUTO: 0.3 % (ref 0–1.5)
BASOPHILS NFR BLD AUTO: 0.4 % (ref 0–1.5)
BASOPHILS NFR BLD AUTO: 0.7 % (ref 0–1.5)
BDY SITE: ABNORMAL
BILIRUB BLD-MCNC: NEGATIVE MG/DL
BILIRUB CONJ SERPL-MCNC: 0.4 MG/DL (ref 0–0.3)
BILIRUB INDIRECT SERPL-MCNC: 0.2 MG/DL
BILIRUB INDIRECT SERPL-MCNC: 0.2 MG/DL
BILIRUB INDIRECT SERPL-MCNC: 0.3 MG/DL
BILIRUB SERPL-MCNC: 0.4 MG/DL (ref 0–1.2)
BILIRUB SERPL-MCNC: 0.5 MG/DL (ref 0–1.2)
BILIRUB SERPL-MCNC: 0.6 MG/DL (ref 0–1.2)
BILIRUB SERPL-MCNC: 0.6 MG/DL (ref 0–1.2)
BILIRUB SERPL-MCNC: 0.7 MG/DL (ref 0–1.2)
BILIRUB SERPL-MCNC: 0.7 MG/DL (ref 0–1.2)
BILIRUB SERPL-MCNC: 0.8 MG/DL (ref 0–1.2)
BILIRUB SERPL-MCNC: 0.9 MG/DL (ref 0–1.2)
BILIRUB SERPL-MCNC: 1 MG/DL (ref 0–1.2)
BILIRUB SERPL-MCNC: 1.3 MG/DL (ref 0–1.2)
BILIRUB UR QL STRIP: NEGATIVE
BILIRUB UR QL STRIP: NEGATIVE
BUN SERPL-MCNC: 26 MG/DL (ref 6–20)
BUN SERPL-MCNC: 27 MG/DL (ref 6–20)
BUN SERPL-MCNC: 31 MG/DL (ref 6–20)
BUN SERPL-MCNC: 31 MG/DL (ref 6–20)
BUN SERPL-MCNC: 32 MG/DL (ref 6–20)
BUN SERPL-MCNC: 33 MG/DL (ref 6–20)
BUN SERPL-MCNC: 38 MG/DL (ref 6–20)
BUN SERPL-MCNC: 39 MG/DL (ref 6–20)
BUN SERPL-MCNC: 40 MG/DL (ref 6–20)
BUN SERPL-MCNC: 42 MG/DL (ref 6–20)
BUN SERPL-MCNC: 50 MG/DL (ref 6–20)
BUN SERPL-MCNC: 60 MG/DL (ref 6–20)
BUN SERPL-MCNC: 73 MG/DL (ref 6–20)
BUN SERPL-MCNC: 75 MG/DL (ref 6–20)
BUN SERPL-MCNC: 76 MG/DL (ref 6–20)
BUN SERPL-MCNC: 78 MG/DL (ref 6–20)
BUN/CREAT SERPL: 16 (ref 7–25)
BUN/CREAT SERPL: 20.3 (ref 7–25)
BUN/CREAT SERPL: 22.1 (ref 7–25)
BUN/CREAT SERPL: 22.4 (ref 7–25)
BUN/CREAT SERPL: 25.5 (ref 7–25)
BUN/CREAT SERPL: 27 (ref 7–25)
BUN/CREAT SERPL: 28.1 (ref 7–25)
BUN/CREAT SERPL: 31.1 (ref 7–25)
BUN/CREAT SERPL: 33 (ref 7–25)
BUN/CREAT SERPL: 34.8 (ref 7–25)
BUN/CREAT SERPL: 36.4 (ref 7–25)
BUN/CREAT SERPL: 37.1 (ref 7–25)
BUN/CREAT SERPL: 37.8 (ref 7–25)
BUN/CREAT SERPL: 39.6 (ref 7–25)
BUN/CREAT SERPL: 40.5 (ref 7–25)
BUN/CREAT SERPL: 43.5 (ref 7–25)
BURR CELLS BLD QL SMEAR: NORMAL
C DIFF TOX GENS STL QL NAA+PROBE: NEGATIVE
CA-I BLDA-SCNC: 0.99 MMOL/L (ref 1.13–1.32)
CA-I BLDA-SCNC: 1 MMOL/L (ref 1.13–1.32)
CA-I BLDA-SCNC: 1 MMOL/L (ref 1.13–1.32)
CA-I BLDA-SCNC: 1.02 MMOL/L (ref 1.13–1.32)
CA-I BLDA-SCNC: 1.08 MMOL/L (ref 1.13–1.32)
CA-I BLDA-SCNC: 1.09 MMOL/L (ref 1.13–1.32)
CALCIUM SPEC-SCNC: 10 MG/DL (ref 8.6–10.5)
CALCIUM SPEC-SCNC: 6.8 MG/DL (ref 8.6–10.5)
CALCIUM SPEC-SCNC: 7 MG/DL (ref 8.6–10.5)
CALCIUM SPEC-SCNC: 7.1 MG/DL (ref 8.6–10.5)
CALCIUM SPEC-SCNC: 7.9 MG/DL (ref 8.6–10.5)
CALCIUM SPEC-SCNC: 7.9 MG/DL (ref 8.6–10.5)
CALCIUM SPEC-SCNC: 8 MG/DL (ref 8.6–10.5)
CALCIUM SPEC-SCNC: 8.3 MG/DL (ref 8.6–10.5)
CALCIUM SPEC-SCNC: 8.4 MG/DL (ref 8.6–10.5)
CALCIUM SPEC-SCNC: 8.6 MG/DL (ref 8.6–10.5)
CALCIUM SPEC-SCNC: 8.6 MG/DL (ref 8.6–10.5)
CALCIUM SPEC-SCNC: 8.7 MG/DL (ref 8.6–10.5)
CALCIUM SPEC-SCNC: 8.9 MG/DL (ref 8.6–10.5)
CALCIUM SPEC-SCNC: 9 MG/DL (ref 8.6–10.5)
CALCIUM SPEC-SCNC: 9.1 MG/DL (ref 8.6–10.5)
CALCIUM SPEC-SCNC: 9.7 MG/DL (ref 8.6–10.5)
CEFAZOLIN SUSC ISLT: <=4
CEFEPIME SUSC ISLT: <=0.12
CEFOTAXIME SUSC ISLT: <=0.12
CEFTAZIDIME SUSC ISLT: <=1
CEFTRIAXONE SUSC ISLT: <=0.12
CEFTRIAXONE SUSC ISLT: <=0.25
CEFTRIAXONE SUSC ISLT: <=0.25
CHLORIDE BLDA-SCNC: 118 MMOL/L (ref 98–106)
CHLORIDE BLDA-SCNC: 122 MMOL/L (ref 98–106)
CHLORIDE SERPL-SCNC: 100 MMOL/L (ref 98–107)
CHLORIDE SERPL-SCNC: 101 MMOL/L (ref 98–107)
CHLORIDE SERPL-SCNC: 102 MMOL/L (ref 98–107)
CHLORIDE SERPL-SCNC: 103 MMOL/L (ref 98–107)
CHLORIDE SERPL-SCNC: 104 MMOL/L (ref 98–107)
CHLORIDE SERPL-SCNC: 108 MMOL/L (ref 98–107)
CHLORIDE SERPL-SCNC: 110 MMOL/L (ref 98–107)
CHLORIDE SERPL-SCNC: 114 MMOL/L (ref 98–107)
CHLORIDE SERPL-SCNC: 118 MMOL/L (ref 98–107)
CHLORIDE SERPL-SCNC: 120 MMOL/L (ref 98–107)
CHLORIDE SERPL-SCNC: 96 MMOL/L (ref 98–107)
CHOLEST SERPL-MCNC: 87 MG/DL (ref 0–200)
CIPROFLOXACIN SUSC ISLT: <=0.25
CK SERPL-CCNC: 90 U/L (ref 20–180)
CLARITY UR: ABNORMAL
CLARITY UR: ABNORMAL
CLARITY, POC: ABNORMAL
CLUMPED PLATELETS: PRESENT
CLUMPED PLATELETS: PRESENT
CO2 SERPL-SCNC: 10 MMOL/L (ref 22–29)
CO2 SERPL-SCNC: 11.2 MMOL/L (ref 22–29)
CO2 SERPL-SCNC: 12 MMOL/L (ref 22–29)
CO2 SERPL-SCNC: 14 MMOL/L (ref 22–29)
CO2 SERPL-SCNC: 14.2 MMOL/L (ref 22–29)
CO2 SERPL-SCNC: 14.6 MMOL/L (ref 22–29)
CO2 SERPL-SCNC: 15.6 MMOL/L (ref 22–29)
CO2 SERPL-SCNC: 16.2 MMOL/L (ref 22–29)
CO2 SERPL-SCNC: 16.4 MMOL/L (ref 22–29)
CO2 SERPL-SCNC: 16.7 MMOL/L (ref 22–29)
CO2 SERPL-SCNC: 17.4 MMOL/L (ref 22–29)
CO2 SERPL-SCNC: 17.4 MMOL/L (ref 22–29)
CO2 SERPL-SCNC: 17.9 MMOL/L (ref 22–29)
CO2 SERPL-SCNC: 19.8 MMOL/L (ref 22–29)
CO2 SERPL-SCNC: 23.2 MMOL/L (ref 22–29)
CO2 SERPL-SCNC: 9.1 MMOL/L (ref 22–29)
COD CRY URNS QL: ABNORMAL /HPF
COHGB MFR BLD: 1.3 % (ref 0–1.5)
COHGB MFR BLD: 1.8 % (ref 0–1.5)
COHGB MFR BLD: 1.8 % (ref 0–1.5)
COLOR UR: YELLOW
CREAT SERPL-MCNC: 0.89 MG/DL (ref 0.57–1)
CREAT SERPL-MCNC: 0.94 MG/DL (ref 0.57–1)
CREAT SERPL-MCNC: 1.06 MG/DL (ref 0.57–1)
CREAT SERPL-MCNC: 1.06 MG/DL (ref 0.57–1)
CREAT SERPL-MCNC: 1.14 MG/DL (ref 0.57–1)
CREAT SERPL-MCNC: 1.15 MG/DL (ref 0.57–1)
CREAT SERPL-MCNC: 1.15 MG/DL (ref 0.57–1)
CREAT SERPL-MCNC: 1.48 MG/DL (ref 0.57–1)
CREAT SERPL-MCNC: 1.63 MG/DL (ref 0.57–1)
CREAT SERPL-MCNC: 1.68 MG/DL (ref 0.57–1)
CREAT SERPL-MCNC: 1.7 MG/DL (ref 0.57–1)
CREAT SERPL-MCNC: 1.92 MG/DL (ref 0.57–1)
CREAT SERPL-MCNC: 2.01 MG/DL (ref 0.57–1)
CREAT SERPL-MCNC: 2.06 MG/DL (ref 0.57–1)
CREAT SERPL-MCNC: 2.26 MG/DL (ref 0.57–1)
CREAT SERPL-MCNC: 2.51 MG/DL (ref 0.57–1)
CRP SERPL-MCNC: 17.98 MG/DL (ref 0–0.5)
CRP SERPL-MCNC: 5.97 MG/DL (ref 0–0.5)
D-LACTATE SERPL-SCNC: 1 MMOL/L (ref 0.5–2)
D-LACTATE SERPL-SCNC: 1.7 MMOL/L (ref 0.5–2)
D-LACTATE SERPL-SCNC: 4.8 MMOL/L (ref 0.5–2)
D-LACTATE SERPL-SCNC: 4.8 MMOL/L (ref 0.5–2)
DEPRECATED RDW RBC AUTO: 47.8 FL (ref 37–54)
DEPRECATED RDW RBC AUTO: 48.8 FL (ref 37–54)
DEPRECATED RDW RBC AUTO: 50.5 FL (ref 37–54)
DEPRECATED RDW RBC AUTO: 54.7 FL (ref 37–54)
DEPRECATED RDW RBC AUTO: 55.1 FL (ref 37–54)
DEPRECATED RDW RBC AUTO: 56.8 FL (ref 37–54)
DEPRECATED RDW RBC AUTO: 57.1 FL (ref 37–54)
DEPRECATED RDW RBC AUTO: 58.2 FL (ref 37–54)
DEPRECATED RDW RBC AUTO: 58.7 FL (ref 37–54)
DEPRECATED RDW RBC AUTO: 58.8 FL (ref 37–54)
DEPRECATED RDW RBC AUTO: 59 FL (ref 37–54)
DEPRECATED RDW RBC AUTO: 65.3 FL (ref 37–54)
DEPRECATED RDW RBC AUTO: 66.6 FL (ref 37–54)
DEPRECATED RDW RBC AUTO: 67.5 FL (ref 37–54)
EOSINOPHIL # BLD AUTO: 0 10*3/MM3 (ref 0–0.4)
EOSINOPHIL # BLD AUTO: 0 10*3/MM3 (ref 0–0.4)
EOSINOPHIL # BLD AUTO: 0.02 10*3/MM3 (ref 0–0.4)
EOSINOPHIL # BLD AUTO: 0.07 10*3/MM3 (ref 0–0.4)
EOSINOPHIL # BLD AUTO: 0.14 10*3/MM3 (ref 0–0.4)
EOSINOPHIL # BLD AUTO: 0.2 10*3/MM3 (ref 0–0.4)
EOSINOPHIL # BLD AUTO: 0.39 10*3/MM3 (ref 0–0.4)
EOSINOPHIL NFR BLD AUTO: 0 % (ref 0.3–6.2)
EOSINOPHIL NFR BLD AUTO: 0 % (ref 0.3–6.2)
EOSINOPHIL NFR BLD AUTO: 0.1 % (ref 0.3–6.2)
EOSINOPHIL NFR BLD AUTO: 0.5 % (ref 0.3–6.2)
EOSINOPHIL NFR BLD AUTO: 1 % (ref 0.3–6.2)
EOSINOPHIL NFR BLD AUTO: 2.8 % (ref 0.3–6.2)
EOSINOPHIL NFR BLD AUTO: 8.6 % (ref 0.3–6.2)
ERTAPENEM SUSC ISLT: <=0.12
ERYTHROCYTE [DISTWIDTH] IN BLOOD BY AUTOMATED COUNT: 14.5 % (ref 12.3–15.4)
ERYTHROCYTE [DISTWIDTH] IN BLOOD BY AUTOMATED COUNT: 14.8 % (ref 12.3–15.4)
ERYTHROCYTE [DISTWIDTH] IN BLOOD BY AUTOMATED COUNT: 15 % (ref 12.3–15.4)
ERYTHROCYTE [DISTWIDTH] IN BLOOD BY AUTOMATED COUNT: 16.2 % (ref 12.3–15.4)
ERYTHROCYTE [DISTWIDTH] IN BLOOD BY AUTOMATED COUNT: 16.6 % (ref 12.3–15.4)
ERYTHROCYTE [DISTWIDTH] IN BLOOD BY AUTOMATED COUNT: 17.1 % (ref 12.3–15.4)
ERYTHROCYTE [DISTWIDTH] IN BLOOD BY AUTOMATED COUNT: 17.2 % (ref 12.3–15.4)
ERYTHROCYTE [DISTWIDTH] IN BLOOD BY AUTOMATED COUNT: 17.7 % (ref 12.3–15.4)
ERYTHROCYTE [DISTWIDTH] IN BLOOD BY AUTOMATED COUNT: 17.9 % (ref 12.3–15.4)
ERYTHROCYTE [DISTWIDTH] IN BLOOD BY AUTOMATED COUNT: 17.9 % (ref 12.3–15.4)
ERYTHROCYTE [DISTWIDTH] IN BLOOD BY AUTOMATED COUNT: 18.3 % (ref 12.3–15.4)
ERYTHROCYTE [DISTWIDTH] IN BLOOD BY AUTOMATED COUNT: 19.2 % (ref 12.3–15.4)
ERYTHROCYTE [DISTWIDTH] IN BLOOD BY AUTOMATED COUNT: 19.5 % (ref 12.3–15.4)
ERYTHROCYTE [DISTWIDTH] IN BLOOD BY AUTOMATED COUNT: 19.5 % (ref 12.3–15.4)
FHHB: 1.4 % (ref 0–5)
FHHB: 5.1 % (ref 0–5)
FHHB: 9.2 % (ref 0–5)
GAS FLOW AIRWAY: ABNORMAL L/MIN
GAS FLOW AIRWAY: ABNORMAL L/MIN
GENTAMICIN SUSC ISLT: <=1
GFR SERPL CREATININE-BSD FRML MDRD: 20 ML/MIN/1.73
GFR SERPL CREATININE-BSD FRML MDRD: 22 ML/MIN/1.73
GFR SERPL CREATININE-BSD FRML MDRD: 25 ML/MIN/1.73
GFR SERPL CREATININE-BSD FRML MDRD: 25 ML/MIN/1.73
GFR SERPL CREATININE-BSD FRML MDRD: 27 ML/MIN/1.73
GFR SERPL CREATININE-BSD FRML MDRD: 31 ML/MIN/1.73
GFR SERPL CREATININE-BSD FRML MDRD: 31 ML/MIN/1.73
GFR SERPL CREATININE-BSD FRML MDRD: 32 ML/MIN/1.73
GFR SERPL CREATININE-BSD FRML MDRD: 36 ML/MIN/1.73
GFR SERPL CREATININE-BSD FRML MDRD: 48 ML/MIN/1.73
GFR SERPL CREATININE-BSD FRML MDRD: 48 ML/MIN/1.73
GFR SERPL CREATININE-BSD FRML MDRD: 49 ML/MIN/1.73
GFR SERPL CREATININE-BSD FRML MDRD: 53 ML/MIN/1.73
GFR SERPL CREATININE-BSD FRML MDRD: 53 ML/MIN/1.73
GFR SERPL CREATININE-BSD FRML MDRD: 61 ML/MIN/1.73
GFR SERPL CREATININE-BSD FRML MDRD: 65 ML/MIN/1.73
GLOBULIN UR ELPH-MCNC: 3.2 GM/DL
GLOBULIN UR ELPH-MCNC: 3.2 GM/DL
GLOBULIN UR ELPH-MCNC: 3.3 GM/DL
GLOBULIN UR ELPH-MCNC: 3.3 GM/DL
GLOBULIN UR ELPH-MCNC: 3.5 GM/DL
GLOBULIN UR ELPH-MCNC: 3.6 GM/DL
GLOBULIN UR ELPH-MCNC: 3.6 GM/DL
GLOBULIN UR ELPH-MCNC: 3.8 GM/DL
GLOBULIN UR ELPH-MCNC: 4.6 GM/DL
GLUCOSE BLDA-MCNC: 218 MMOL/L (ref 65–99)
GLUCOSE BLDA-MCNC: 366 MMOL/L (ref 65–99)
GLUCOSE BLDC GLUCOMTR-MCNC: 133 MG/DL (ref 70–99)
GLUCOSE BLDC GLUCOMTR-MCNC: 167 MG/DL (ref 70–99)
GLUCOSE BLDC GLUCOMTR-MCNC: 171 MG/DL (ref 70–99)
GLUCOSE BLDC GLUCOMTR-MCNC: 173 MG/DL (ref 70–99)
GLUCOSE BLDC GLUCOMTR-MCNC: 178 MG/DL (ref 70–99)
GLUCOSE BLDC GLUCOMTR-MCNC: 179 MG/DL (ref 70–99)
GLUCOSE BLDC GLUCOMTR-MCNC: 185 MG/DL (ref 70–99)
GLUCOSE BLDC GLUCOMTR-MCNC: 189 MG/DL (ref 70–99)
GLUCOSE BLDC GLUCOMTR-MCNC: 190 MG/DL (ref 70–99)
GLUCOSE BLDC GLUCOMTR-MCNC: 196 MG/DL (ref 70–99)
GLUCOSE BLDC GLUCOMTR-MCNC: 201 MG/DL (ref 70–99)
GLUCOSE BLDC GLUCOMTR-MCNC: 204 MG/DL (ref 70–99)
GLUCOSE BLDC GLUCOMTR-MCNC: 207 MG/DL (ref 70–99)
GLUCOSE BLDC GLUCOMTR-MCNC: 209 MG/DL (ref 70–99)
GLUCOSE BLDC GLUCOMTR-MCNC: 212 MG/DL (ref 70–99)
GLUCOSE BLDC GLUCOMTR-MCNC: 220 MG/DL (ref 70–99)
GLUCOSE BLDC GLUCOMTR-MCNC: 250 MG/DL (ref 70–99)
GLUCOSE BLDC GLUCOMTR-MCNC: 266 MG/DL (ref 70–99)
GLUCOSE BLDC GLUCOMTR-MCNC: 91 MG/DL (ref 70–99)
GLUCOSE SERPL-MCNC: 101 MG/DL (ref 65–99)
GLUCOSE SERPL-MCNC: 106 MG/DL (ref 65–99)
GLUCOSE SERPL-MCNC: 109 MG/DL (ref 65–99)
GLUCOSE SERPL-MCNC: 125 MG/DL (ref 65–99)
GLUCOSE SERPL-MCNC: 190 MG/DL (ref 65–99)
GLUCOSE SERPL-MCNC: 197 MG/DL (ref 65–99)
GLUCOSE SERPL-MCNC: 208 MG/DL (ref 65–99)
GLUCOSE SERPL-MCNC: 213 MG/DL (ref 65–99)
GLUCOSE SERPL-MCNC: 242 MG/DL (ref 65–99)
GLUCOSE SERPL-MCNC: 276 MG/DL (ref 65–99)
GLUCOSE SERPL-MCNC: 62 MG/DL (ref 65–99)
GLUCOSE SERPL-MCNC: 65 MG/DL (ref 65–99)
GLUCOSE SERPL-MCNC: 81 MG/DL (ref 65–99)
GLUCOSE SERPL-MCNC: 85 MG/DL (ref 65–99)
GLUCOSE SERPL-MCNC: 99 MG/DL (ref 65–99)
GLUCOSE SERPL-MCNC: 99 MG/DL (ref 65–99)
GLUCOSE UR STRIP-MCNC: NEGATIVE MG/DL
HCO3 BLDA-SCNC: 11.5 MMOL/L (ref 22–26)
HCO3 BLDA-SCNC: 12.3 MMOL/L (ref 22–26)
HCO3 BLDA-SCNC: 9.8 MMOL/L (ref 22–26)
HCT VFR BLD AUTO: 25.8 % (ref 37–47)
HCT VFR BLD AUTO: 26 % (ref 34–46.6)
HCT VFR BLD AUTO: 27.1 % (ref 34–46.6)
HCT VFR BLD AUTO: 27.4 % (ref 34–46.6)
HCT VFR BLD AUTO: 27.7 % (ref 34–46.6)
HCT VFR BLD AUTO: 28.1 % (ref 34–46.6)
HCT VFR BLD AUTO: 29.3 % (ref 34–46.6)
HCT VFR BLD AUTO: 29.5 % (ref 34–46.6)
HCT VFR BLD AUTO: 31.6 % (ref 34–46.6)
HCT VFR BLD AUTO: 31.7 % (ref 34–46.6)
HCT VFR BLD AUTO: 32.2 % (ref 34–46.6)
HCT VFR BLD AUTO: 35.2 % (ref 34–46.6)
HCT VFR BLD AUTO: 35.8 % (ref 34–46.6)
HCT VFR BLD AUTO: 36.2 % (ref 34–46.6)
HCT VFR BLD AUTO: 42.7 % (ref 34–46.6)
HGB BLD-MCNC: 10 G/DL (ref 12–15.9)
HGB BLD-MCNC: 10.2 G/DL (ref 12–15.9)
HGB BLD-MCNC: 10.6 G/DL (ref 12–15.9)
HGB BLD-MCNC: 11.4 G/DL (ref 12–15.9)
HGB BLD-MCNC: 11.5 G/DL (ref 12–15.9)
HGB BLD-MCNC: 11.9 G/DL (ref 12–15.9)
HGB BLD-MCNC: 14 G/DL (ref 12–15.9)
HGB BLD-MCNC: 7.9 G/DL (ref 12–16)
HGB BLD-MCNC: 8.1 G/DL (ref 12–15.9)
HGB BLD-MCNC: 8.4 G/DL (ref 12–15.9)
HGB BLD-MCNC: 8.5 G/DL (ref 12–15.9)
HGB BLD-MCNC: 9 G/DL (ref 12–15.9)
HGB BLD-MCNC: 9.1 G/DL (ref 12–15.9)
HGB BLD-MCNC: 9.6 G/DL (ref 12–15.9)
HGB BLD-MCNC: 9.6 G/DL (ref 12–15.9)
HGB BLDA-MCNC: 7.7 G/DL (ref 11.7–14.6)
HGB BLDA-MCNC: 8 G/DL (ref 11.7–14.6)
HGB BLDA-MCNC: 9.1 G/DL (ref 11.7–14.6)
HGB UR QL STRIP.AUTO: ABNORMAL
HGB UR QL STRIP.AUTO: ABNORMAL
HOLD SPECIMEN: NORMAL
HYALINE CASTS UR QL AUTO: ABNORMAL /LPF
HYALINE CASTS UR QL AUTO: ABNORMAL /LPF
HYPOCHROMIA BLD QL: ABNORMAL
HYPOCHROMIA BLD QL: ABNORMAL
IMM GRANULOCYTES # BLD AUTO: 0.03 10*3/MM3 (ref 0–0.05)
IMM GRANULOCYTES # BLD AUTO: 0.05 10*3/MM3 (ref 0–0.05)
IMM GRANULOCYTES # BLD AUTO: 0.18 10*3/MM3 (ref 0–0.05)
IMM GRANULOCYTES # BLD AUTO: 0.21 10*3/MM3 (ref 0–0.05)
IMM GRANULOCYTES # BLD AUTO: 0.22 10*3/MM3 (ref 0–0.05)
IMM GRANULOCYTES # BLD AUTO: 0.25 10*3/MM3 (ref 0–0.05)
IMM GRANULOCYTES # BLD AUTO: 0.62 10*3/MM3 (ref 0–0.05)
IMM GRANULOCYTES NFR BLD AUTO: 0.7 % (ref 0–0.5)
IMM GRANULOCYTES NFR BLD AUTO: 0.7 % (ref 0–0.5)
IMM GRANULOCYTES NFR BLD AUTO: 1.1 % (ref 0–0.5)
IMM GRANULOCYTES NFR BLD AUTO: 1.3 % (ref 0–0.5)
IMM GRANULOCYTES NFR BLD AUTO: 1.4 % (ref 0–0.5)
IMM GRANULOCYTES NFR BLD AUTO: 1.9 % (ref 0–0.5)
IMM GRANULOCYTES NFR BLD AUTO: 4 % (ref 0–0.5)
INHALED O2 CONCENTRATION: 100 %
INHALED O2 CONCENTRATION: 21 %
INHALED O2 CONCENTRATION: 50 %
KETONES UR QL STRIP: NEGATIVE
KETONES UR QL STRIP: NEGATIVE
KETONES UR QL: NEGATIVE
LACTATE BLDA-SCNC: 2.96 MMOL/L (ref 0.5–2)
LACTATE BLDA-SCNC: 7 MMOL/L (ref 0.5–2)
LACTATE BLDA-SCNC: ABNORMAL MMOL/L
LARGE PLATELETS: ABNORMAL
LARGE PLATELETS: ABNORMAL
LARGE PLATELETS: NORMAL
LARGE PLATELETS: NORMAL
LEUKOCYTE EST, POC: ABNORMAL
LEUKOCYTE ESTERASE UR QL STRIP.AUTO: ABNORMAL
LEUKOCYTE ESTERASE UR QL STRIP.AUTO: ABNORMAL
LEVOFLOXACIN SUSC ISLT: 2
LEVOFLOXACIN SUSC ISLT: <=0.12
LIPASE SERPL-CCNC: 33 U/L (ref 13–60)
LYMPHOCYTES # BLD AUTO: 0.9 10*3/MM3 (ref 0.7–3.1)
LYMPHOCYTES # BLD AUTO: 1.2 10*3/MM3 (ref 0.7–3.1)
LYMPHOCYTES # BLD AUTO: 1.21 10*3/MM3 (ref 0.7–3.1)
LYMPHOCYTES # BLD AUTO: 1.31 10*3/MM3 (ref 0.7–3.1)
LYMPHOCYTES # BLD AUTO: 1.55 10*3/MM3 (ref 0.7–3.1)
LYMPHOCYTES # BLD AUTO: 1.57 10*3/MM3 (ref 0.7–3.1)
LYMPHOCYTES # BLD AUTO: 1.92 10*3/MM3 (ref 0.7–3.1)
LYMPHOCYTES # BLD MANUAL: 0.7 10*3/MM3 (ref 0.7–3.1)
LYMPHOCYTES # BLD MANUAL: 2.41 10*3/MM3 (ref 0.7–3.1)
LYMPHOCYTES # BLD MANUAL: 3.53 10*3/MM3 (ref 0.7–3.1)
LYMPHOCYTES # BLD MANUAL: 4.03 10*3/MM3 (ref 0.7–3.1)
LYMPHOCYTES NFR BLD AUTO: 10 % (ref 19.6–45.3)
LYMPHOCYTES NFR BLD AUTO: 11.6 % (ref 19.6–45.3)
LYMPHOCYTES NFR BLD AUTO: 11.7 % (ref 19.6–45.3)
LYMPHOCYTES NFR BLD AUTO: 12.6 % (ref 19.6–45.3)
LYMPHOCYTES NFR BLD AUTO: 26.5 % (ref 19.6–45.3)
LYMPHOCYTES NFR BLD AUTO: 7.7 % (ref 19.6–45.3)
LYMPHOCYTES NFR BLD AUTO: 8.6 % (ref 19.6–45.3)
LYMPHOCYTES NFR BLD MANUAL: 1 % (ref 5–12)
LYMPHOCYTES NFR BLD MANUAL: 13 % (ref 19.6–45.3)
LYMPHOCYTES NFR BLD MANUAL: 15 % (ref 19.6–45.3)
LYMPHOCYTES NFR BLD MANUAL: 2 % (ref 5–12)
LYMPHOCYTES NFR BLD MANUAL: 5 % (ref 5–12)
LYMPHOCYTES NFR BLD MANUAL: 7 % (ref 5–12)
LYMPHOCYTES NFR BLD MANUAL: 8 % (ref 19.6–45.3)
LYMPHOCYTES NFR BLD MANUAL: 9 % (ref 19.6–45.3)
MACROCYTES BLD QL SMEAR: ABNORMAL
MAGNESIUM SERPL-MCNC: 1.6 MG/DL (ref 1.6–2.6)
MAGNESIUM SERPL-MCNC: 1.8 MG/DL (ref 1.6–2.6)
MAGNESIUM SERPL-MCNC: 2 MG/DL (ref 1.6–2.6)
MAGNESIUM SERPL-MCNC: 2.2 MG/DL (ref 1.6–2.6)
MAGNESIUM SERPL-MCNC: 2.2 MG/DL (ref 1.6–2.6)
MAGNESIUM SERPL-MCNC: 2.3 MG/DL (ref 1.6–2.6)
MAGNESIUM SERPL-MCNC: 2.5 MG/DL (ref 1.6–2.6)
MAGNESIUM SERPL-MCNC: 2.6 MG/DL (ref 1.6–2.6)
MCH RBC QN AUTO: 29.2 PG (ref 26.6–33)
MCH RBC QN AUTO: 29.3 PG (ref 26.6–33)
MCH RBC QN AUTO: 29.4 PG (ref 26.6–33)
MCH RBC QN AUTO: 29.4 PG (ref 26.6–33)
MCH RBC QN AUTO: 29.5 PG (ref 26.6–33)
MCH RBC QN AUTO: 29.6 PG (ref 26.6–33)
MCH RBC QN AUTO: 29.8 PG (ref 26.6–33)
MCH RBC QN AUTO: 29.8 PG (ref 26.6–33)
MCH RBC QN AUTO: 30 PG (ref 26.6–33)
MCH RBC QN AUTO: 30 PG (ref 26.6–33)
MCH RBC QN AUTO: 30.1 PG (ref 26.6–33)
MCH RBC QN AUTO: 30.2 PG (ref 26.6–33)
MCHC RBC AUTO-ENTMCNC: 30.7 G/DL (ref 31.5–35.7)
MCHC RBC AUTO-ENTMCNC: 31.2 G/DL (ref 31.5–35.7)
MCHC RBC AUTO-ENTMCNC: 31.4 G/DL (ref 31.5–35.7)
MCHC RBC AUTO-ENTMCNC: 31.6 G/DL (ref 31.5–35.7)
MCHC RBC AUTO-ENTMCNC: 32 G/DL (ref 31.5–35.7)
MCHC RBC AUTO-ENTMCNC: 32.1 G/DL (ref 31.5–35.7)
MCHC RBC AUTO-ENTMCNC: 32.2 G/DL (ref 31.5–35.7)
MCHC RBC AUTO-ENTMCNC: 32.4 G/DL (ref 31.5–35.7)
MCHC RBC AUTO-ENTMCNC: 32.5 G/DL (ref 31.5–35.7)
MCHC RBC AUTO-ENTMCNC: 32.8 G/DL (ref 31.5–35.7)
MCHC RBC AUTO-ENTMCNC: 32.8 G/DL (ref 31.5–35.7)
MCHC RBC AUTO-ENTMCNC: 32.9 G/DL (ref 31.5–35.7)
MCV RBC AUTO: 90.2 FL (ref 79–97)
MCV RBC AUTO: 90.2 FL (ref 79–97)
MCV RBC AUTO: 90.5 FL (ref 79–97)
MCV RBC AUTO: 90.5 FL (ref 79–97)
MCV RBC AUTO: 90.7 FL (ref 79–97)
MCV RBC AUTO: 91.1 FL (ref 79–97)
MCV RBC AUTO: 91.2 FL (ref 79–97)
MCV RBC AUTO: 91.2 FL (ref 79–97)
MCV RBC AUTO: 91.3 FL (ref 79–97)
MCV RBC AUTO: 91.4 FL (ref 79–97)
MCV RBC AUTO: 92.7 FL (ref 79–97)
MCV RBC AUTO: 95.6 FL (ref 79–97)
MCV RBC AUTO: 96.4 FL (ref 79–97)
MCV RBC AUTO: 98.2 FL (ref 79–97)
METHGB BLD QL: 0.1 % (ref 0–1.5)
METHGB BLD QL: 0.2 % (ref 0–1.5)
METHGB BLD QL: 0.2 % (ref 0–1.5)
MODALITY: ABNORMAL
MONOCYTES # BLD AUTO: 0.16 10*3/MM3 (ref 0.1–0.9)
MONOCYTES # BLD AUTO: 0.27 10*3/MM3 (ref 0.1–0.9)
MONOCYTES # BLD AUTO: 0.54 10*3/MM3 (ref 0.1–0.9)
MONOCYTES # BLD AUTO: 0.8 10*3/MM3 (ref 0.1–0.9)
MONOCYTES # BLD AUTO: 0.89 10*3/MM3 (ref 0.1–0.9)
MONOCYTES # BLD AUTO: 0.94 10*3/MM3 (ref 0.1–0.9)
MONOCYTES # BLD AUTO: 1.01 10*3/MM3 (ref 0.1–0.9)
MONOCYTES # BLD AUTO: 1.04 10*3/MM3 (ref 0.1–0.9)
MONOCYTES # BLD AUTO: 1.09 10*3/MM3 (ref 0.1–0.9)
MONOCYTES # BLD AUTO: 1.33 10*3/MM3 (ref 0.1–0.9)
MONOCYTES # BLD AUTO: 1.76 10*3/MM3 (ref 0.1–0.9)
MONOCYTES NFR BLD AUTO: 11.9 % (ref 5–12)
MONOCYTES NFR BLD AUTO: 12.5 % (ref 5–12)
MONOCYTES NFR BLD AUTO: 6 % (ref 5–12)
MONOCYTES NFR BLD AUTO: 6.7 % (ref 5–12)
MONOCYTES NFR BLD AUTO: 7.1 % (ref 5–12)
MONOCYTES NFR BLD AUTO: 7.5 % (ref 5–12)
MONOCYTES NFR BLD AUTO: 8.1 % (ref 5–12)
MRSA DNA SPEC QL NAA+PROBE: NORMAL
NEUTROPHILS # BLD AUTO: 12.84 10*3/MM3 (ref 1.7–7)
NEUTROPHILS # BLD AUTO: 19.41 10*3/MM3 (ref 1.7–7)
NEUTROPHILS # BLD AUTO: 23.34 10*3/MM3 (ref 1.7–7)
NEUTROPHILS # BLD AUTO: 6.97 10*3/MM3 (ref 1.7–7)
NEUTROPHILS NFR BLD AUTO: 10.36 10*3/MM3 (ref 1.7–7)
NEUTROPHILS NFR BLD AUTO: 12.23 10*3/MM3 (ref 1.7–7)
NEUTROPHILS NFR BLD AUTO: 12.59 10*3/MM3 (ref 1.7–7)
NEUTROPHILS NFR BLD AUTO: 12.99 10*3/MM3 (ref 1.7–7)
NEUTROPHILS NFR BLD AUTO: 13.22 10*3/MM3 (ref 1.7–7)
NEUTROPHILS NFR BLD AUTO: 2.34 10*3/MM3 (ref 1.7–7)
NEUTROPHILS NFR BLD AUTO: 5.04 10*3/MM3 (ref 1.7–7)
NEUTROPHILS NFR BLD AUTO: 51.6 % (ref 42.7–76)
NEUTROPHILS NFR BLD AUTO: 70.7 % (ref 42.7–76)
NEUTROPHILS NFR BLD AUTO: 77.2 % (ref 42.7–76)
NEUTROPHILS NFR BLD AUTO: 78.6 % (ref 42.7–76)
NEUTROPHILS NFR BLD AUTO: 79 % (ref 42.7–76)
NEUTROPHILS NFR BLD AUTO: 82.3 % (ref 42.7–76)
NEUTROPHILS NFR BLD AUTO: 84.4 % (ref 42.7–76)
NEUTROPHILS NFR BLD MANUAL: 61 % (ref 42.7–76)
NEUTROPHILS NFR BLD MANUAL: 66 % (ref 42.7–76)
NEUTROPHILS NFR BLD MANUAL: 67 % (ref 42.7–76)
NEUTROPHILS NFR BLD MANUAL: 76 % (ref 42.7–76)
NEUTS BAND NFR BLD MANUAL: 11 % (ref 0–5)
NEUTS BAND NFR BLD MANUAL: 13 % (ref 0–5)
NEUTS BAND NFR BLD MANUAL: 19 % (ref 0–5)
NEUTS BAND NFR BLD MANUAL: 19 % (ref 0–5)
NEUTS HYPERSEG # BLD: ABNORMAL 10*3/UL
NITRITE UR QL STRIP: NEGATIVE
NITRITE UR QL STRIP: NEGATIVE
NITRITE UR-MCNC: NEGATIVE MG/ML
NITROFURANTOIN SUSC ISLT: 128
NITROFURANTOIN SUSC ISLT: 32
NITROFURANTOIN SUSC ISLT: <=16
NOTE: ABNORMAL
NRBC BLD AUTO-RTO: 0 /100 WBC (ref 0–0.2)
NRBC BLD AUTO-RTO: 0.3 /100 WBC (ref 0–0.2)
NRBC SPEC MANUAL: 2 /100 WBC (ref 0–0.2)
OXYHGB MFR BLDV: 89.4 % (ref 94–99)
OXYHGB MFR BLDV: 92.9 % (ref 94–99)
OXYHGB MFR BLDV: 96.6 % (ref 94–99)
PCO2 BLDA: 18.8 MM HG (ref 35–45)
PCO2 BLDA: 36.4 MM HG (ref 35–45)
PCO2 BLDA: 41.8 MM HG (ref 35–45)
PEEP RESPIRATORY: 5 CM[H2O]
PENICILLIN G SUSC ISLT: <=0.06
PH BLDA: 7.05 PH UNITS (ref 7.35–7.45)
PH BLDA: 7.06 PH UNITS (ref 7.35–7.45)
PH BLDA: 7.43 PH UNITS (ref 7.35–7.45)
PH UR STRIP.AUTO: 8 [PH] (ref 5–8)
PH UR STRIP.AUTO: 8 [PH] (ref 5–8)
PH UR: 8.5 [PH] (ref 5–8)
PHOSPHATE SERPL-MCNC: 1.2 MG/DL (ref 2.5–4.5)
PHOSPHATE SERPL-MCNC: 2 MG/DL (ref 2.5–4.5)
PHOSPHATE SERPL-MCNC: 2.1 MG/DL (ref 2.5–4.5)
PHOSPHATE SERPL-MCNC: 2.2 MG/DL (ref 2.5–4.5)
PHOSPHATE SERPL-MCNC: 2.6 MG/DL (ref 2.5–4.5)
PHOSPHATE SERPL-MCNC: 3.7 MG/DL (ref 2.5–4.5)
PHOSPHATE SERPL-MCNC: 3.7 MG/DL (ref 2.5–4.5)
PHOSPHATE SERPL-MCNC: 3.8 MG/DL (ref 2.5–4.5)
PIP+TAZO SUSC ISLT: 32
PIP+TAZO SUSC ISLT: <=4
PIP+TAZO SUSC ISLT: <=4
PLAT MORPH BLD: NORMAL
PLAT MORPH BLD: NORMAL
PLATELET # BLD AUTO: 108 10*3/MM3 (ref 140–450)
PLATELET # BLD AUTO: 115 10*3/MM3 (ref 140–450)
PLATELET # BLD AUTO: 119 10*3/MM3 (ref 140–450)
PLATELET # BLD AUTO: 146 10*3/MM3 (ref 140–450)
PLATELET # BLD AUTO: 159 10*3/MM3 (ref 140–450)
PLATELET # BLD AUTO: 160 10*3/MM3 (ref 140–450)
PLATELET # BLD AUTO: 174 10*3/MM3 (ref 140–450)
PLATELET # BLD AUTO: 176 10*3/MM3 (ref 140–450)
PLATELET # BLD AUTO: 181 10*3/MM3 (ref 140–450)
PLATELET # BLD AUTO: 195 10*3/MM3 (ref 140–450)
PLATELET # BLD AUTO: 211 10*3/MM3 (ref 140–450)
PLATELET # BLD AUTO: 223 10*3/MM3 (ref 140–450)
PLATELET # BLD AUTO: 327 10*3/MM3 (ref 140–450)
PLATELET # BLD AUTO: 408 10*3/MM3 (ref 140–450)
PMV BLD AUTO: 10.8 FL (ref 6–12)
PMV BLD AUTO: 11.9 FL (ref 6–12)
PMV BLD AUTO: 12.6 FL (ref 6–12)
PMV BLD AUTO: 9.1 FL (ref 6–12)
PMV BLD AUTO: 9.2 FL (ref 6–12)
PMV BLD AUTO: 9.3 FL (ref 6–12)
PMV BLD AUTO: 9.4 FL (ref 6–12)
PMV BLD AUTO: 9.5 FL (ref 6–12)
PMV BLD AUTO: 9.5 FL (ref 6–12)
PMV BLD AUTO: 9.6 FL (ref 6–12)
PMV BLD AUTO: 9.6 FL (ref 6–12)
PMV BLD AUTO: 9.7 FL (ref 6–12)
PO2 BLD: 229 MM[HG] (ref 0–500)
PO2 BLD: 310 MM[HG] (ref 0–500)
PO2 BLD: 324 MM[HG] (ref 0–500)
PO2 BLDA: 114.6 MM HG (ref 80–100)
PO2 BLDA: 324 MM HG (ref 80–100)
PO2 BLDA: 65.1 MM HG (ref 80–100)
POIKILOCYTOSIS BLD QL SMEAR: NORMAL
POLYCHROMASIA BLD QL SMEAR: ABNORMAL
POTASSIUM BLDA-SCNC: 4.54 MMOL/L (ref 3.5–5)
POTASSIUM BLDA-SCNC: 5.24 MMOL/L (ref 3.5–5)
POTASSIUM SERPL-SCNC: 2.6 MMOL/L (ref 3.5–5.2)
POTASSIUM SERPL-SCNC: 2.7 MMOL/L (ref 3.5–5.2)
POTASSIUM SERPL-SCNC: 2.7 MMOL/L (ref 3.5–5.2)
POTASSIUM SERPL-SCNC: 2.9 MMOL/L (ref 3.5–5.2)
POTASSIUM SERPL-SCNC: 2.9 MMOL/L (ref 3.5–5.2)
POTASSIUM SERPL-SCNC: 3 MMOL/L (ref 3.5–5.2)
POTASSIUM SERPL-SCNC: 3.4 MMOL/L (ref 3.5–5.2)
POTASSIUM SERPL-SCNC: 3.6 MMOL/L (ref 3.5–5.2)
POTASSIUM SERPL-SCNC: 3.6 MMOL/L (ref 3.5–5.2)
POTASSIUM SERPL-SCNC: 3.7 MMOL/L (ref 3.5–5.2)
POTASSIUM SERPL-SCNC: 4 MMOL/L (ref 3.5–5.2)
POTASSIUM SERPL-SCNC: 4.2 MMOL/L (ref 3.5–5.2)
POTASSIUM SERPL-SCNC: 4.6 MMOL/L (ref 3.5–5.2)
POTASSIUM SERPL-SCNC: 4.7 MMOL/L (ref 3.5–5.2)
POTASSIUM SERPL-SCNC: 4.9 MMOL/L (ref 3.5–5.2)
POTASSIUM SERPL-SCNC: 5.2 MMOL/L (ref 3.5–5.2)
POTASSIUM SERPL-SCNC: 6 MMOL/L (ref 3.5–5.2)
PREALB SERPL-MCNC: 3.9 MG/DL (ref 20–40)
PREALB SERPL-MCNC: 4.4 MG/DL (ref 20–40)
PROCALCITONIN SERPL-MCNC: 0.73 NG/ML (ref 0–0.25)
PROT SERPL-MCNC: 4.9 G/DL (ref 6–8.5)
PROT SERPL-MCNC: 4.9 G/DL (ref 6–8.5)
PROT SERPL-MCNC: 5.1 G/DL (ref 6–8.5)
PROT SERPL-MCNC: 5.4 G/DL (ref 6–8.5)
PROT SERPL-MCNC: 5.8 G/DL (ref 6–8.5)
PROT SERPL-MCNC: 5.9 G/DL (ref 6–8.5)
PROT SERPL-MCNC: 5.9 G/DL (ref 6–8.5)
PROT SERPL-MCNC: 6 G/DL (ref 6–8.5)
PROT SERPL-MCNC: 6.1 G/DL (ref 6–8.5)
PROT SERPL-MCNC: 6.2 G/DL (ref 6–8.5)
PROT SERPL-MCNC: 7.2 G/DL (ref 6–8.5)
PROT SERPL-MCNC: 8.7 G/DL (ref 6–8.5)
PROT UR QL STRIP: ABNORMAL
PROT UR QL STRIP: ABNORMAL
PROT UR STRIP-MCNC: ABNORMAL MG/DL
QT INTERVAL: 359 MS
RBC # BLD AUTO: 2.72 10*6/MM3 (ref 3.77–5.28)
RBC # BLD AUTO: 2.79 10*6/MM3 (ref 3.77–5.28)
RBC # BLD AUTO: 2.81 10*6/MM3 (ref 3.77–5.28)
RBC # BLD AUTO: 3.07 10*6/MM3 (ref 3.77–5.28)
RBC # BLD AUTO: 3.08 10*6/MM3 (ref 3.77–5.28)
RBC # BLD AUTO: 3.25 10*6/MM3 (ref 3.77–5.28)
RBC # BLD AUTO: 3.26 10*6/MM3 (ref 3.77–5.28)
RBC # BLD AUTO: 3.41 10*6/MM3 (ref 3.77–5.28)
RBC # BLD AUTO: 3.48 10*6/MM3 (ref 3.77–5.28)
RBC # BLD AUTO: 3.53 10*6/MM3 (ref 3.77–5.28)
RBC # BLD AUTO: 3.88 10*6/MM3 (ref 3.77–5.28)
RBC # BLD AUTO: 3.92 10*6/MM3 (ref 3.77–5.28)
RBC # BLD AUTO: 4 10*6/MM3 (ref 3.77–5.28)
RBC # BLD AUTO: 4.67 10*6/MM3 (ref 3.77–5.28)
RBC # UR STRIP: ABNORMAL /UL
RBC # UR: ABNORMAL /HPF
RBC # UR: ABNORMAL /HPF
RBC MORPH BLD: NORMAL
REF LAB TEST METHOD: ABNORMAL
REF LAB TEST METHOD: ABNORMAL
SAO2 % BLDCOA: 90.7 % (ref 95–99)
SAO2 % BLDCOA: 94.8 % (ref 95–99)
SAO2 % BLDCOA: 98.6 % (ref 95–99)
SARS-COV-2 N GENE RESP QL NAA+PROBE: NOT DETECTED
SARS-COV-2 RNA RESP QL NAA+PROBE: NOT DETECTED
SARS-COV-2 RNA SPEC QL NAA+PROBE: NOT DETECTED
SCAN SLIDE: NORMAL
SET MECH RESP RATE: 24
SMALL PLATELETS BLD QL SMEAR: ABNORMAL
SMALL PLATELETS BLD QL SMEAR: ABNORMAL
SMALL PLATELETS BLD QL SMEAR: ADEQUATE
SODIUM BLDA-SCNC: 139.8 MMOL/L (ref 136–146)
SODIUM BLDA-SCNC: 141.6 MMOL/L (ref 136–146)
SODIUM SERPL-SCNC: 125 MMOL/L (ref 136–145)
SODIUM SERPL-SCNC: 127 MMOL/L (ref 136–145)
SODIUM SERPL-SCNC: 129 MMOL/L (ref 136–145)
SODIUM SERPL-SCNC: 129 MMOL/L (ref 136–145)
SODIUM SERPL-SCNC: 132 MMOL/L (ref 136–145)
SODIUM SERPL-SCNC: 137 MMOL/L (ref 136–145)
SODIUM SERPL-SCNC: 142 MMOL/L (ref 136–145)
SODIUM SERPL-SCNC: 145 MMOL/L (ref 136–145)
SODIUM SERPL-SCNC: 146 MMOL/L (ref 136–145)
SODIUM SERPL-SCNC: 147 MMOL/L (ref 136–145)
SODIUM SERPL-SCNC: 147 MMOL/L (ref 136–145)
SODIUM SERPL-SCNC: 148 MMOL/L (ref 136–145)
SODIUM SERPL-SCNC: 149 MMOL/L (ref 136–145)
SP GR UR STRIP: 1.01 (ref 1–1.03)
SP GR UR STRIP: 1.01 (ref 1–1.03)
SP GR UR: 1.01 (ref 1–1.03)
SQUAMOUS #/AREA URNS HPF: ABNORMAL /HPF
SQUAMOUS #/AREA URNS HPF: ABNORMAL /HPF
TARGETS BLD QL SMEAR: ABNORMAL
TETRACYCLINE SUSC ISLT: >=16
TIGECYCLINE SUSC ISLT: <=0.06
TMP SMX SUSC ISLT: <=20
TOBRAMYCIN SUSC ISLT: <=1
TOXIC GRANULATION: ABNORMAL
TOXIC GRANULATION: NORMAL
TOXIC GRANULATION: NORMAL
TRI-PHOS CRY URNS QL MICRO: ABNORMAL /HPF
TRIGL SERPL-MCNC: 266 MG/DL (ref 0–150)
TRIGL SERPL-MCNC: 99 MG/DL (ref 0–150)
TROPONIN T SERPL-MCNC: <0.01 NG/ML (ref 0–0.03)
UROBILINOGEN UR QL STRIP: ABNORMAL
UROBILINOGEN UR QL STRIP: ABNORMAL
UROBILINOGEN UR QL: NORMAL
VANCOMYCIN SUSC ISLT: 0.5
VARIANT LYMPHS NFR BLD MANUAL: 1 % (ref 0–5)
VARIANT LYMPHS NFR BLD MANUAL: 3 % (ref 0–5)
VARIANT LYMPHS NFR BLD MANUAL: 4 % (ref 0–5)
VENTILATOR MODE: AC
WBC # BLD AUTO: 13.42 10*3/MM3 (ref 3.4–10.8)
WBC # BLD AUTO: 15.29 10*3/MM3 (ref 3.4–10.8)
WBC # BLD AUTO: 15.48 10*3/MM3 (ref 3.4–10.8)
WBC # BLD AUTO: 15.68 10*3/MM3 (ref 3.4–10.8)
WBC # BLD AUTO: 16.05 10*3/MM3 (ref 3.4–10.8)
WBC # BLD AUTO: 16.51 10*3/MM3 (ref 3.4–10.8)
WBC # BLD AUTO: 25.21 10*3/MM3 (ref 3.4–10.8)
WBC # BLD AUTO: 27.14 10*3/MM3 (ref 3.4–10.8)
WBC # BLD AUTO: 4.53 10*3/MM3 (ref 3.4–10.8)
WBC # BLD AUTO: 5.74 10*3/MM3 (ref 3.4–10.8)
WBC # BLD AUTO: 6.1 10*3/MM3 (ref 3.4–10.8)
WBC # BLD AUTO: 7.13 10*3/MM3 (ref 3.4–10.8)
WBC # BLD AUTO: 7.3 10*3/MM3 (ref 3.4–10.8)
WBC # BLD AUTO: 7.83 10*3/MM3 (ref 3.4–10.8)
WBC MORPH BLD: NORMAL
WBC UR QL AUTO: ABNORMAL /HPF
WBC UR QL AUTO: ABNORMAL /HPF
WHOLE BLOOD HOLD SPECIMEN: NORMAL

## 2021-01-01 PROCEDURE — 82962 GLUCOSE BLOOD TEST: CPT

## 2021-01-01 PROCEDURE — 74178 CT ABD&PLV WO CNTR FLWD CNTR: CPT

## 2021-01-01 PROCEDURE — 99291 CRITICAL CARE FIRST HOUR: CPT | Performed by: INTERNAL MEDICINE

## 2021-01-01 PROCEDURE — 83735 ASSAY OF MAGNESIUM: CPT | Performed by: FAMILY MEDICINE

## 2021-01-01 PROCEDURE — 87186 SC STD MICRODIL/AGAR DIL: CPT | Performed by: HOSPITALIST

## 2021-01-01 PROCEDURE — 25010000002 CALCIUM GLUCONATE PER 10 ML: Performed by: INTERNAL MEDICINE

## 2021-01-01 PROCEDURE — 63710000001 INSULIN LISPRO (HUMAN) PER 5 UNITS: Performed by: INTERNAL MEDICINE

## 2021-01-01 PROCEDURE — 25010000002 PHENYLEPHRINE 10 MG/ML SOLUTION

## 2021-01-01 PROCEDURE — 94799 UNLISTED PULMONARY SVC/PX: CPT

## 2021-01-01 PROCEDURE — G0378 HOSPITAL OBSERVATION PER HR: HCPCS

## 2021-01-01 PROCEDURE — 97110 THERAPEUTIC EXERCISES: CPT

## 2021-01-01 PROCEDURE — 25010000003 POTASSIUM CHLORIDE PER 2 MEQ: Performed by: INTERNAL MEDICINE

## 2021-01-01 PROCEDURE — 83735 ASSAY OF MAGNESIUM: CPT | Performed by: INTERNAL MEDICINE

## 2021-01-01 PROCEDURE — 31500 INSERT EMERGENCY AIRWAY: CPT | Performed by: INTERNAL MEDICINE

## 2021-01-01 PROCEDURE — 82330 ASSAY OF CALCIUM: CPT | Performed by: INTERNAL MEDICINE

## 2021-01-01 PROCEDURE — 99233 SBSQ HOSP IP/OBS HIGH 50: CPT | Performed by: FAMILY MEDICINE

## 2021-01-01 PROCEDURE — 84100 ASSAY OF PHOSPHORUS: CPT | Performed by: INTERNAL MEDICINE

## 2021-01-01 PROCEDURE — 99217 PR OBSERVATION CARE DISCHARGE MANAGEMENT: CPT | Performed by: INTERNAL MEDICINE

## 2021-01-01 PROCEDURE — 25010000002 MEROPENEM PER 100 MG: Performed by: INTERNAL MEDICINE

## 2021-01-01 PROCEDURE — 80053 COMPREHEN METABOLIC PANEL: CPT | Performed by: EMERGENCY MEDICINE

## 2021-01-01 PROCEDURE — 85025 COMPLETE CBC W/AUTO DIFF WBC: CPT | Performed by: INTERNAL MEDICINE

## 2021-01-01 PROCEDURE — 99220 PR INITIAL OBSERVATION CARE/DAY 70 MINUTES: CPT | Performed by: PHYSICIAN ASSISTANT

## 2021-01-01 PROCEDURE — 87088 URINE BACTERIA CULTURE: CPT | Performed by: NURSE PRACTITIONER

## 2021-01-01 PROCEDURE — 97166 OT EVAL MOD COMPLEX 45 MIN: CPT

## 2021-01-01 PROCEDURE — 25010000002 CEFTRIAXONE PER 250 MG: Performed by: FAMILY MEDICINE

## 2021-01-01 PROCEDURE — 80048 BASIC METABOLIC PNL TOTAL CA: CPT | Performed by: ANESTHESIOLOGY

## 2021-01-01 PROCEDURE — 83605 ASSAY OF LACTIC ACID: CPT | Performed by: EMERGENCY MEDICINE

## 2021-01-01 PROCEDURE — 93005 ELECTROCARDIOGRAM TRACING: CPT

## 2021-01-01 PROCEDURE — 94003 VENT MGMT INPAT SUBQ DAY: CPT

## 2021-01-01 PROCEDURE — U0003 INFECTIOUS AGENT DETECTION BY NUCLEIC ACID (DNA OR RNA); SEVERE ACUTE RESPIRATORY SYNDROME CORONAVIRUS 2 (SARS-COV-2) (CORONAVIRUS DISEASE [COVID-19]), AMPLIFIED PROBE TECHNIQUE, MAKING USE OF HIGH THROUGHPUT TECHNOLOGIES AS DESCRIBED BY CMS-2020-01-R: HCPCS

## 2021-01-01 PROCEDURE — 93005 ELECTROCARDIOGRAM TRACING: CPT | Performed by: EMERGENCY MEDICINE

## 2021-01-01 PROCEDURE — 99231 SBSQ HOSP IP/OBS SF/LOW 25: CPT | Performed by: SURGERY

## 2021-01-01 PROCEDURE — 99284 EMERGENCY DEPT VISIT MOD MDM: CPT

## 2021-01-01 PROCEDURE — 99223 1ST HOSP IP/OBS HIGH 75: CPT | Performed by: FAMILY MEDICINE

## 2021-01-01 PROCEDURE — 85025 COMPLETE CBC W/AUTO DIFF WBC: CPT | Performed by: PHYSICIAN ASSISTANT

## 2021-01-01 PROCEDURE — 74019 RADEX ABDOMEN 2 VIEWS: CPT

## 2021-01-01 PROCEDURE — 80048 BASIC METABOLIC PNL TOTAL CA: CPT | Performed by: FAMILY MEDICINE

## 2021-01-01 PROCEDURE — 83605 ASSAY OF LACTIC ACID: CPT | Performed by: PHYSICIAN ASSISTANT

## 2021-01-01 PROCEDURE — 85007 BL SMEAR W/DIFF WBC COUNT: CPT | Performed by: FAMILY MEDICINE

## 2021-01-01 PROCEDURE — 84100 ASSAY OF PHOSPHORUS: CPT | Performed by: SURGERY

## 2021-01-01 PROCEDURE — 74176 CT ABD & PELVIS W/O CONTRAST: CPT

## 2021-01-01 PROCEDURE — 83050 HGB METHEMOGLOBIN QUAN: CPT | Performed by: INTERNAL MEDICINE

## 2021-01-01 PROCEDURE — 96361 HYDRATE IV INFUSION ADD-ON: CPT

## 2021-01-01 PROCEDURE — 85025 COMPLETE CBC W/AUTO DIFF WBC: CPT

## 2021-01-01 PROCEDURE — 99291 CRITICAL CARE FIRST HOUR: CPT | Performed by: SURGERY

## 2021-01-01 PROCEDURE — 82805 BLOOD GASES W/O2 SATURATION: CPT | Performed by: INTERNAL MEDICINE

## 2021-01-01 PROCEDURE — 99232 SBSQ HOSP IP/OBS MODERATE 35: CPT | Performed by: SURGERY

## 2021-01-01 PROCEDURE — 70450 CT HEAD/BRAIN W/O DYE: CPT

## 2021-01-01 PROCEDURE — 0 IOHEXOL 12 MG/ML SOLUTION: Performed by: NURSE PRACTITIONER

## 2021-01-01 PROCEDURE — 80053 COMPREHEN METABOLIC PANEL: CPT | Performed by: INTERNAL MEDICINE

## 2021-01-01 PROCEDURE — 82375 ASSAY CARBOXYHB QUANT: CPT | Performed by: INTERNAL MEDICINE

## 2021-01-01 PROCEDURE — 82550 ASSAY OF CK (CPK): CPT | Performed by: PHYSICIAN ASSISTANT

## 2021-01-01 PROCEDURE — 25010000003 POTASSIUM CHLORIDE 10 MEQ/100ML SOLUTION: Performed by: PHYSICIAN ASSISTANT

## 2021-01-01 PROCEDURE — 85025 COMPLETE CBC W/AUTO DIFF WBC: CPT | Performed by: FAMILY MEDICINE

## 2021-01-01 PROCEDURE — 25010000002 MORPHINE PER 10 MG: Performed by: FAMILY MEDICINE

## 2021-01-01 PROCEDURE — 74177 CT ABD & PELVIS W/CONTRAST: CPT

## 2021-01-01 PROCEDURE — 87493 C DIFF AMPLIFIED PROBE: CPT | Performed by: INTERNAL MEDICINE

## 2021-01-01 PROCEDURE — 99233 SBSQ HOSP IP/OBS HIGH 50: CPT | Performed by: INTERNAL MEDICINE

## 2021-01-01 PROCEDURE — C2617 STENT, NON-COR, TEM W/O DEL: HCPCS | Performed by: UROLOGY

## 2021-01-01 PROCEDURE — 25010000002 MAGNESIUM SULFATE PER 500 MG OF MAGNESIUM: Performed by: INTERNAL MEDICINE

## 2021-01-01 PROCEDURE — 80076 HEPATIC FUNCTION PANEL: CPT | Performed by: FAMILY MEDICINE

## 2021-01-01 PROCEDURE — 84134 ASSAY OF PREALBUMIN: CPT | Performed by: INTERNAL MEDICINE

## 2021-01-01 PROCEDURE — 87635 SARS-COV-2 COVID-19 AMP PRB: CPT | Performed by: SURGERY

## 2021-01-01 PROCEDURE — 86140 C-REACTIVE PROTEIN: CPT | Performed by: INTERNAL MEDICINE

## 2021-01-01 PROCEDURE — C1769 GUIDE WIRE: HCPCS | Performed by: UROLOGY

## 2021-01-01 PROCEDURE — 87641 MR-STAPH DNA AMP PROBE: CPT | Performed by: INTERNAL MEDICINE

## 2021-01-01 PROCEDURE — 84132 ASSAY OF SERUM POTASSIUM: CPT | Performed by: FAMILY MEDICINE

## 2021-01-01 PROCEDURE — 02HV33Z INSERTION OF INFUSION DEVICE INTO SUPERIOR VENA CAVA, PERCUTANEOUS APPROACH: ICD-10-PCS | Performed by: INTERNAL MEDICINE

## 2021-01-01 PROCEDURE — 76000 FLUOROSCOPY <1 HR PHYS/QHP: CPT

## 2021-01-01 PROCEDURE — 80053 COMPREHEN METABOLIC PANEL: CPT | Performed by: SURGERY

## 2021-01-01 PROCEDURE — 99232 SBSQ HOSP IP/OBS MODERATE 35: CPT | Performed by: INTERNAL MEDICINE

## 2021-01-01 PROCEDURE — 83735 ASSAY OF MAGNESIUM: CPT | Performed by: SURGERY

## 2021-01-01 PROCEDURE — 0BH17EZ INSERTION OF ENDOTRACHEAL AIRWAY INTO TRACHEA, VIA NATURAL OR ARTIFICIAL OPENING: ICD-10-PCS | Performed by: INTERNAL MEDICINE

## 2021-01-01 PROCEDURE — 0 IOPAMIDOL PER 1 ML: Performed by: INTERNAL MEDICINE

## 2021-01-01 PROCEDURE — 85007 BL SMEAR W/DIFF WBC COUNT: CPT | Performed by: PHYSICIAN ASSISTANT

## 2021-01-01 PROCEDURE — 25010000002 MEROPENEM PER 100 MG

## 2021-01-01 PROCEDURE — 85027 COMPLETE CBC AUTOMATED: CPT | Performed by: PHYSICIAN ASSISTANT

## 2021-01-01 PROCEDURE — 25010000002 PROPOFOL 10 MG/ML EMULSION: Performed by: NURSE ANESTHETIST, CERTIFIED REGISTERED

## 2021-01-01 PROCEDURE — 80048 BASIC METABOLIC PNL TOTAL CA: CPT | Performed by: PHYSICIAN ASSISTANT

## 2021-01-01 PROCEDURE — 5A1945Z RESPIRATORY VENTILATION, 24-96 CONSECUTIVE HOURS: ICD-10-PCS | Performed by: INTERNAL MEDICINE

## 2021-01-01 PROCEDURE — C1751 CATH, INF, PER/CENT/MIDLINE: HCPCS | Performed by: NURSE ANESTHETIST, CERTIFIED REGISTERED

## 2021-01-01 PROCEDURE — 25010000002 LEVOFLOXACIN PER 250 MG: Performed by: UROLOGY

## 2021-01-01 PROCEDURE — 80053 COMPREHEN METABOLIC PANEL: CPT

## 2021-01-01 PROCEDURE — 87040 BLOOD CULTURE FOR BACTERIA: CPT | Performed by: EMERGENCY MEDICINE

## 2021-01-01 PROCEDURE — 94002 VENT MGMT INPAT INIT DAY: CPT

## 2021-01-01 PROCEDURE — 25010000002 MORPHINE PER 10 MG: Performed by: HOSPITALIST

## 2021-01-01 PROCEDURE — 82465 ASSAY BLD/SERUM CHOLESTEROL: CPT | Performed by: INTERNAL MEDICINE

## 2021-01-01 PROCEDURE — 87040 BLOOD CULTURE FOR BACTERIA: CPT | Performed by: NURSE PRACTITIONER

## 2021-01-01 PROCEDURE — 25010000002 POTASSIUM CHLORIDE PER 2 MEQ OF POTASSIUM: Performed by: INTERNAL MEDICINE

## 2021-01-01 PROCEDURE — 85027 COMPLETE CBC AUTOMATED: CPT | Performed by: INTERNAL MEDICINE

## 2021-01-01 PROCEDURE — 85025 COMPLETE CBC W/AUTO DIFF WBC: CPT | Performed by: ANESTHESIOLOGY

## 2021-01-01 PROCEDURE — 71045 X-RAY EXAM CHEST 1 VIEW: CPT

## 2021-01-01 PROCEDURE — 93010 ELECTROCARDIOGRAM REPORT: CPT | Performed by: INTERNAL MEDICINE

## 2021-01-01 PROCEDURE — 81001 URINALYSIS AUTO W/SCOPE: CPT | Performed by: EMERGENCY MEDICINE

## 2021-01-01 PROCEDURE — 97530 THERAPEUTIC ACTIVITIES: CPT

## 2021-01-01 PROCEDURE — 97535 SELF CARE MNGMENT TRAINING: CPT

## 2021-01-01 PROCEDURE — 25010000002 LEVOFLOXACIN PER 250 MG: Performed by: EMERGENCY MEDICINE

## 2021-01-01 PROCEDURE — 99285 EMERGENCY DEPT VISIT HI MDM: CPT

## 2021-01-01 PROCEDURE — 25010000002 ONDANSETRON PER 1 MG: Performed by: EMERGENCY MEDICINE

## 2021-01-01 PROCEDURE — 87086 URINE CULTURE/COLONY COUNT: CPT | Performed by: STUDENT IN AN ORGANIZED HEALTH CARE EDUCATION/TRAINING PROGRAM

## 2021-01-01 PROCEDURE — 99225 PR SBSQ OBSERVATION CARE/DAY 25 MINUTES: CPT | Performed by: INTERNAL MEDICINE

## 2021-01-01 PROCEDURE — 85007 BL SMEAR W/DIFF WBC COUNT: CPT | Performed by: INTERNAL MEDICINE

## 2021-01-01 PROCEDURE — 25010000002 LEVOFLOXACIN PER 250 MG: Performed by: PHYSICIAN ASSISTANT

## 2021-01-01 PROCEDURE — 25010000002 MAGNESIUM SULFATE IN D5W 1G/100ML (PREMIX) 1-5 GM/100ML-% SOLUTION: Performed by: PHYSICIAN ASSISTANT

## 2021-01-01 PROCEDURE — 36600 WITHDRAWAL OF ARTERIAL BLOOD: CPT | Performed by: INTERNAL MEDICINE

## 2021-01-01 PROCEDURE — 84484 ASSAY OF TROPONIN QUANT: CPT

## 2021-01-01 PROCEDURE — 87086 URINE CULTURE/COLONY COUNT: CPT | Performed by: NURSE PRACTITIONER

## 2021-01-01 PROCEDURE — 84100 ASSAY OF PHOSPHORUS: CPT | Performed by: FAMILY MEDICINE

## 2021-01-01 PROCEDURE — 25010000002 MICAFUNGIN SODIUM 100 MG RECONSTITUTED SOLUTION

## 2021-01-01 PROCEDURE — 80048 BASIC METABOLIC PNL TOTAL CA: CPT | Performed by: INTERNAL MEDICINE

## 2021-01-01 PROCEDURE — 99214 OFFICE O/P EST MOD 30 MIN: CPT | Performed by: STUDENT IN AN ORGANIZED HEALTH CARE EDUCATION/TRAINING PROGRAM

## 2021-01-01 PROCEDURE — 87186 SC STD MICRODIL/AGAR DIL: CPT | Performed by: NURSE PRACTITIONER

## 2021-01-01 PROCEDURE — 97161 PT EVAL LOW COMPLEX 20 MIN: CPT

## 2021-01-01 PROCEDURE — 36415 COLL VENOUS BLD VENIPUNCTURE: CPT | Performed by: PHYSICIAN ASSISTANT

## 2021-01-01 PROCEDURE — 83735 ASSAY OF MAGNESIUM: CPT

## 2021-01-01 PROCEDURE — 74018 RADEX ABDOMEN 1 VIEW: CPT

## 2021-01-01 PROCEDURE — 87086 URINE CULTURE/COLONY COUNT: CPT | Performed by: HOSPITALIST

## 2021-01-01 PROCEDURE — 99231 SBSQ HOSP IP/OBS SF/LOW 25: CPT | Performed by: INTERNAL MEDICINE

## 2021-01-01 PROCEDURE — 83690 ASSAY OF LIPASE: CPT | Performed by: EMERGENCY MEDICINE

## 2021-01-01 PROCEDURE — C1751 CATH, INF, PER/CENT/MIDLINE: HCPCS

## 2021-01-01 PROCEDURE — 25010000002 ENOXAPARIN PER 10 MG: Performed by: SURGERY

## 2021-01-01 PROCEDURE — 0 IOPAMIDOL PER 1 ML: Performed by: FAMILY MEDICINE

## 2021-01-01 PROCEDURE — 99231 SBSQ HOSP IP/OBS SF/LOW 25: CPT | Performed by: NURSE PRACTITIONER

## 2021-01-01 PROCEDURE — 80053 COMPREHEN METABOLIC PANEL: CPT | Performed by: FAMILY MEDICINE

## 2021-01-01 PROCEDURE — 96365 THER/PROPH/DIAG IV INF INIT: CPT

## 2021-01-01 PROCEDURE — 25010000002 HYDROMORPHONE 1 MG/ML SOLUTION: Performed by: EMERGENCY MEDICINE

## 2021-01-01 PROCEDURE — 25010000002 PROPOFOL 10 MG/ML EMULSION: Performed by: INTERNAL MEDICINE

## 2021-01-01 PROCEDURE — 84478 ASSAY OF TRIGLYCERIDES: CPT | Performed by: INTERNAL MEDICINE

## 2021-01-01 PROCEDURE — 87040 BLOOD CULTURE FOR BACTERIA: CPT | Performed by: INTERNAL MEDICINE

## 2021-01-01 PROCEDURE — 84145 PROCALCITONIN (PCT): CPT | Performed by: PHYSICIAN ASSISTANT

## 2021-01-01 PROCEDURE — 52332 CYSTOSCOPY AND TREATMENT: CPT | Performed by: UROLOGY

## 2021-01-01 PROCEDURE — 99232 SBSQ HOSP IP/OBS MODERATE 35: CPT | Performed by: FAMILY MEDICINE

## 2021-01-01 PROCEDURE — 3E0436Z INTRODUCTION OF NUTRITIONAL SUBSTANCE INTO CENTRAL VEIN, PERCUTANEOUS APPROACH: ICD-10-PCS | Performed by: INTERNAL MEDICINE

## 2021-01-01 PROCEDURE — 83605 ASSAY OF LACTIC ACID: CPT | Performed by: NURSE PRACTITIONER

## 2021-01-01 PROCEDURE — 25010000003 POTASSIUM CHLORIDE PER 2 MEQ: Performed by: PHYSICIAN ASSISTANT

## 2021-01-01 PROCEDURE — 99222 1ST HOSP IP/OBS MODERATE 55: CPT | Performed by: SURGERY

## 2021-01-01 PROCEDURE — 25010000002 FENTANYL CITRATE (PF) 50 MCG/ML SOLUTION: Performed by: NURSE ANESTHETIST, CERTIFIED REGISTERED

## 2021-01-01 PROCEDURE — 71250 CT THORAX DX C-: CPT

## 2021-01-01 PROCEDURE — 36415 COLL VENOUS BLD VENIPUNCTURE: CPT | Performed by: FAMILY MEDICINE

## 2021-01-01 DEVICE — URINARY DIVERSION STENT SET
Type: IMPLANTABLE DEVICE | Site: URETER | Status: FUNCTIONAL
Brand: PERCUFLEX™ URINARY DIVERSION STENT SET

## 2021-01-01 RX ORDER — NICOTINE 21 MG/24HR
1 PATCH, TRANSDERMAL 24 HOURS TRANSDERMAL
Status: DISCONTINUED | OUTPATIENT
Start: 2021-01-01 | End: 2021-01-01 | Stop reason: HOSPADM

## 2021-01-01 RX ORDER — LORAZEPAM 2 MG/ML
0.5 INJECTION INTRAMUSCULAR
Status: DISCONTINUED | OUTPATIENT
Start: 2021-01-01 | End: 2021-01-01 | Stop reason: HOSPADM

## 2021-01-01 RX ORDER — SODIUM CHLORIDE 0.9 % (FLUSH) 0.9 %
10 SYRINGE (ML) INJECTION AS NEEDED
Status: DISCONTINUED | OUTPATIENT
Start: 2021-01-01 | End: 2021-01-01 | Stop reason: HOSPADM

## 2021-01-01 RX ORDER — POTASSIUM CHLORIDE 29.8 MG/ML
20 INJECTION INTRAVENOUS
Status: COMPLETED | OUTPATIENT
Start: 2021-01-01 | End: 2021-01-01

## 2021-01-01 RX ORDER — FENOFIBRATE 160 MG/1
160 TABLET ORAL EVERY MORNING
COMMUNITY

## 2021-01-01 RX ORDER — LEVOFLOXACIN 5 MG/ML
500 INJECTION, SOLUTION INTRAVENOUS ONCE
Status: COMPLETED | OUTPATIENT
Start: 2021-01-01 | End: 2021-01-01

## 2021-01-01 RX ORDER — IPRATROPIUM/ALBUTEROL SULFATE 20-100 MCG
1 MIST INHALER (GRAM) INHALATION EVERY 6 HOURS SCHEDULED
Status: ON HOLD | COMMUNITY
End: 2021-01-01

## 2021-01-01 RX ORDER — CETIRIZINE HYDROCHLORIDE 10 MG/1
10 TABLET ORAL NIGHTLY
COMMUNITY
Start: 2021-01-01

## 2021-01-01 RX ORDER — FLUTICASONE PROPIONATE 50 MCG
2 SPRAY, SUSPENSION (ML) NASAL DAILY PRN
Status: ON HOLD | COMMUNITY
End: 2021-01-01

## 2021-01-01 RX ORDER — SODIUM CHLORIDE, SODIUM LACTATE, POTASSIUM CHLORIDE, CALCIUM CHLORIDE 600; 310; 30; 20 MG/100ML; MG/100ML; MG/100ML; MG/100ML
9 INJECTION, SOLUTION INTRAVENOUS CONTINUOUS PRN
Status: DISCONTINUED | OUTPATIENT
Start: 2021-01-01 | End: 2021-01-01 | Stop reason: HOSPADM

## 2021-01-01 RX ORDER — POTASSIUM CHLORIDE 750 MG/1
40 CAPSULE, EXTENDED RELEASE ORAL ONCE
Status: COMPLETED | OUTPATIENT
Start: 2021-01-01 | End: 2021-01-01

## 2021-01-01 RX ORDER — HALOPERIDOL 0.5 MG/1
0.5 TABLET ORAL
Status: DISCONTINUED | OUTPATIENT
Start: 2021-01-01 | End: 2021-01-01 | Stop reason: HOSPADM

## 2021-01-01 RX ORDER — MORPHINE SULFATE 20 MG/ML
5 SOLUTION ORAL
Status: DISCONTINUED | OUTPATIENT
Start: 2021-01-01 | End: 2021-01-01 | Stop reason: HOSPADM

## 2021-01-01 RX ORDER — ONDANSETRON 2 MG/ML
4 INJECTION INTRAMUSCULAR; INTRAVENOUS ONCE AS NEEDED
Status: DISCONTINUED | OUTPATIENT
Start: 2021-01-01 | End: 2021-01-01 | Stop reason: HOSPADM

## 2021-01-01 RX ORDER — LORAZEPAM 0.5 MG/1
0.5 TABLET ORAL
Status: DISCONTINUED | OUTPATIENT
Start: 2021-01-01 | End: 2021-01-01 | Stop reason: HOSPADM

## 2021-01-01 RX ORDER — AMOXICILLIN 250 MG
2 CAPSULE ORAL 2 TIMES DAILY
Status: DISCONTINUED | OUTPATIENT
Start: 2021-01-01 | End: 2021-01-01

## 2021-01-01 RX ORDER — LORAZEPAM 2 MG/ML
0.5 CONCENTRATE ORAL
Status: DISCONTINUED | OUTPATIENT
Start: 2021-01-01 | End: 2021-01-01 | Stop reason: HOSPADM

## 2021-01-01 RX ORDER — DULOXETIN HYDROCHLORIDE 30 MG/1
120 CAPSULE, DELAYED RELEASE ORAL NIGHTLY
Status: DISCONTINUED | OUTPATIENT
Start: 2021-01-01 | End: 2021-01-01 | Stop reason: HOSPADM

## 2021-01-01 RX ORDER — BISACODYL 5 MG/1
5 TABLET, DELAYED RELEASE ORAL DAILY PRN
Status: DISCONTINUED | OUTPATIENT
Start: 2021-01-01 | End: 2021-01-01 | Stop reason: HOSPADM

## 2021-01-01 RX ORDER — NORTRIPTYLINE HYDROCHLORIDE 25 MG/1
150 CAPSULE ORAL NIGHTLY
Status: DISCONTINUED | OUTPATIENT
Start: 2021-01-01 | End: 2021-01-01 | Stop reason: HOSPADM

## 2021-01-01 RX ORDER — DULOXETIN HYDROCHLORIDE 60 MG/1
120 CAPSULE, DELAYED RELEASE ORAL NIGHTLY
COMMUNITY
Start: 2021-01-01

## 2021-01-01 RX ORDER — FENTANYL/ROPIVACAINE/NS/PF 2-625MCG/1
15 PLASTIC BAG, INJECTION (ML) EPIDURAL ONCE
Status: COMPLETED | OUTPATIENT
Start: 2021-01-01 | End: 2021-01-01

## 2021-01-01 RX ORDER — CALCIUM CHLORIDE 100 MG/ML
1 INJECTION INTRAVENOUS; INTRAVENTRICULAR ONCE
Status: COMPLETED | OUTPATIENT
Start: 2021-01-01 | End: 2021-01-01

## 2021-01-01 RX ORDER — OMEPRAZOLE 20 MG/1
CAPSULE, DELAYED RELEASE ORAL
Qty: 90 CAPSULE | Refills: 0 | Status: SHIPPED | OUTPATIENT
Start: 2021-01-01

## 2021-01-01 RX ORDER — NOREPINEPHRINE BIT/0.9 % NACL 8 MG/250ML
.02-.3 INFUSION BOTTLE (ML) INTRAVENOUS ONCE
Status: COMPLETED | OUTPATIENT
Start: 2021-01-01 | End: 2021-01-01

## 2021-01-01 RX ORDER — LEVOFLOXACIN 5 MG/ML
250 INJECTION, SOLUTION INTRAVENOUS EVERY 24 HOURS
Status: DISCONTINUED | OUTPATIENT
Start: 2021-01-01 | End: 2021-01-01 | Stop reason: HOSPADM

## 2021-01-01 RX ORDER — NALOXONE HCL 0.4 MG/ML
0.4 VIAL (ML) INJECTION
Status: DISCONTINUED | OUTPATIENT
Start: 2021-01-01 | End: 2021-01-01 | Stop reason: HOSPADM

## 2021-01-01 RX ORDER — ASPIRIN 81 MG/1
81 TABLET ORAL EVERY 24 HOURS
Status: ON HOLD | COMMUNITY
End: 2021-01-01

## 2021-01-01 RX ORDER — POTASSIUM CHLORIDE 750 MG/1
40 CAPSULE, EXTENDED RELEASE ORAL EVERY 4 HOURS
Status: DISCONTINUED | OUTPATIENT
Start: 2021-01-01 | End: 2021-01-01 | Stop reason: HOSPADM

## 2021-01-01 RX ORDER — HYDROCODONE BITARTRATE AND ACETAMINOPHEN 5; 325 MG/1; MG/1
2 TABLET ORAL ONCE AS NEEDED
Status: DISCONTINUED | OUTPATIENT
Start: 2021-01-01 | End: 2021-01-01 | Stop reason: HOSPADM

## 2021-01-01 RX ORDER — SODIUM CHLORIDE 0.9 % (FLUSH) 0.9 %
10 SYRINGE (ML) INJECTION EVERY 12 HOURS SCHEDULED
Status: CANCELLED | OUTPATIENT
Start: 2021-01-01

## 2021-01-01 RX ORDER — SODIUM CHLORIDE 0.9 % (FLUSH) 0.9 %
20 SYRINGE (ML) INJECTION AS NEEDED
Status: CANCELLED | OUTPATIENT
Start: 2021-01-01

## 2021-01-01 RX ORDER — HALOPERIDOL 5 MG/ML
0.5 INJECTION INTRAMUSCULAR
Status: DISCONTINUED | OUTPATIENT
Start: 2021-01-01 | End: 2021-01-01 | Stop reason: HOSPADM

## 2021-01-01 RX ORDER — IPRATROPIUM BROMIDE AND ALBUTEROL SULFATE 2.5; .5 MG/3ML; MG/3ML
3 SOLUTION RESPIRATORY (INHALATION)
Status: DISCONTINUED | OUTPATIENT
Start: 2021-01-01 | End: 2021-01-01

## 2021-01-01 RX ORDER — MIDAZOLAM HYDROCHLORIDE 1 MG/ML
2 INJECTION INTRAMUSCULAR; INTRAVENOUS ONCE
Status: DISCONTINUED | OUTPATIENT
Start: 2021-01-01 | End: 2021-01-01 | Stop reason: HOSPADM

## 2021-01-01 RX ORDER — SODIUM CHLORIDE 0.9 % (FLUSH) 0.9 %
10 SYRINGE (ML) INJECTION EVERY 12 HOURS SCHEDULED
Status: DISCONTINUED | OUTPATIENT
Start: 2021-01-01 | End: 2021-01-01 | Stop reason: HOSPADM

## 2021-01-01 RX ORDER — ONDANSETRON 4 MG/1
4 TABLET, FILM COATED ORAL ONCE AS NEEDED
Status: DISCONTINUED | OUTPATIENT
Start: 2021-01-01 | End: 2021-01-01 | Stop reason: HOSPADM

## 2021-01-01 RX ORDER — LEVOFLOXACIN 500 MG/1
250 TABLET, FILM COATED ORAL DAILY
Qty: 10 TABLET | Refills: 0 | Status: SHIPPED | OUTPATIENT
Start: 2021-01-01 | End: 2021-01-01 | Stop reason: HOSPADM

## 2021-01-01 RX ORDER — MORPHINE SULFATE 2 MG/ML
2 INJECTION, SOLUTION INTRAMUSCULAR; INTRAVENOUS EVERY 4 HOURS PRN
Status: DISPENSED | OUTPATIENT
Start: 2021-01-01 | End: 2021-01-01

## 2021-01-01 RX ORDER — FENTANYL CITRATE 50 UG/ML
INJECTION, SOLUTION INTRAMUSCULAR; INTRAVENOUS AS NEEDED
Status: DISCONTINUED | OUTPATIENT
Start: 2021-01-01 | End: 2021-01-01 | Stop reason: SURG

## 2021-01-01 RX ORDER — MEPERIDINE HYDROCHLORIDE 25 MG/ML
12.5 INJECTION INTRAMUSCULAR; INTRAVENOUS; SUBCUTANEOUS
Status: DISCONTINUED | OUTPATIENT
Start: 2021-01-01 | End: 2021-01-01 | Stop reason: HOSPADM

## 2021-01-01 RX ORDER — POLYETHYLENE GLYCOL 3350 17 G/17G
17 POWDER, FOR SOLUTION ORAL DAILY PRN
Status: DISCONTINUED | OUTPATIENT
Start: 2021-01-01 | End: 2021-01-01

## 2021-01-01 RX ORDER — FERROUS SULFATE 325(65) MG
325 TABLET ORAL DAILY
COMMUNITY
End: 2021-01-01 | Stop reason: SDUPTHER

## 2021-01-01 RX ORDER — LEVOFLOXACIN 5 MG/ML
500 INJECTION, SOLUTION INTRAVENOUS EVERY 24 HOURS
Status: DISCONTINUED | OUTPATIENT
Start: 2021-01-01 | End: 2021-01-01

## 2021-01-01 RX ORDER — CLONAZEPAM 1 MG/1
1 TABLET ORAL 2 TIMES DAILY
Qty: 60 TABLET | Refills: 3
Start: 2021-01-01 | End: 2021-01-01 | Stop reason: SDUPTHER

## 2021-01-01 RX ORDER — BISACODYL 5 MG/1
5 TABLET, DELAYED RELEASE ORAL DAILY PRN
Status: DISCONTINUED | OUTPATIENT
Start: 2021-01-01 | End: 2021-01-01

## 2021-01-01 RX ORDER — HYDROXYZINE 50 MG/1
50 TABLET, FILM COATED ORAL NIGHTLY
Qty: 90 TABLET | Refills: 2 | Status: SHIPPED | OUTPATIENT
Start: 2021-01-01 | End: 2021-01-01 | Stop reason: SDUPTHER

## 2021-01-01 RX ORDER — MULTIPLE VITAMINS W/ MINERALS TAB 9MG-400MCG
1 TAB ORAL DAILY
Status: ON HOLD | COMMUNITY
End: 2021-01-01

## 2021-01-01 RX ORDER — DEXTROSE MONOHYDRATE 100 MG/ML
25 INJECTION, SOLUTION INTRAVENOUS
Status: DISCONTINUED | OUTPATIENT
Start: 2021-01-01 | End: 2021-01-01 | Stop reason: HOSPADM

## 2021-01-01 RX ORDER — NICOTINE POLACRILEX 4 MG
15 LOZENGE BUCCAL
Status: DISCONTINUED | OUTPATIENT
Start: 2021-01-01 | End: 2021-01-01 | Stop reason: HOSPADM

## 2021-01-01 RX ORDER — GLYCOPYRROLATE 0.2 MG/ML
0.2 INJECTION INTRAMUSCULAR; INTRAVENOUS
Status: DISCONTINUED | OUTPATIENT
Start: 2021-01-01 | End: 2021-01-01 | Stop reason: HOSPADM

## 2021-01-01 RX ORDER — SODIUM CHLORIDE 9 MG/ML
125 INJECTION, SOLUTION INTRAVENOUS CONTINUOUS
Status: DISCONTINUED | OUTPATIENT
Start: 2021-01-01 | End: 2021-01-01

## 2021-01-01 RX ORDER — FENTANYL/ROPIVACAINE/NS/PF 2-625MCG/1
15 PLASTIC BAG, INJECTION (ML) EPIDURAL
Status: COMPLETED | OUTPATIENT
Start: 2021-01-01 | End: 2021-01-01

## 2021-01-01 RX ORDER — OMEPRAZOLE 20 MG/1
20 CAPSULE, DELAYED RELEASE ORAL DAILY
COMMUNITY
End: 2021-01-01

## 2021-01-01 RX ORDER — MECLIZINE HCL 12.5 MG/1
12.5 TABLET ORAL 3 TIMES DAILY PRN
Qty: 15 TABLET | Refills: 0 | Status: ON HOLD | OUTPATIENT
Start: 2021-01-01 | End: 2021-01-01

## 2021-01-01 RX ORDER — SODIUM CHLORIDE 0.9 % (FLUSH) 0.9 %
10 SYRINGE (ML) INJECTION AS NEEDED
Status: CANCELLED | OUTPATIENT
Start: 2021-01-01

## 2021-01-01 RX ORDER — POLYETHYLENE GLYCOL 3350 17 G/17G
17 POWDER, FOR SOLUTION ORAL DAILY PRN
Status: DISCONTINUED | OUTPATIENT
Start: 2021-01-01 | End: 2021-01-01 | Stop reason: HOSPADM

## 2021-01-01 RX ORDER — FENTANYL CITRATE-0.9 % NACL/PF 10 MCG/ML
100 PLASTIC BAG, INJECTION (ML) INTRAVENOUS
Status: DISCONTINUED | OUTPATIENT
Start: 2021-01-01 | End: 2021-01-01 | Stop reason: HOSPADM

## 2021-01-01 RX ORDER — NORTRIPTYLINE HYDROCHLORIDE 75 MG/1
150 CAPSULE ORAL NIGHTLY
COMMUNITY
End: 2021-01-01 | Stop reason: SDUPTHER

## 2021-01-01 RX ORDER — HYDROXYZINE 50 MG/1
50 TABLET, FILM COATED ORAL NIGHTLY
Qty: 90 TABLET | Refills: 1 | Status: SHIPPED | OUTPATIENT
Start: 2021-01-01

## 2021-01-01 RX ORDER — HYDROXYZINE 50 MG/1
TABLET, FILM COATED ORAL
Qty: 90 TABLET | Refills: 0 | Status: SHIPPED | OUTPATIENT
Start: 2021-01-01 | End: 2021-01-01 | Stop reason: SDUPTHER

## 2021-01-01 RX ORDER — HYDROXYZINE 50 MG/1
50 TABLET, FILM COATED ORAL NIGHTLY
Qty: 90 TABLET | Refills: 1 | Status: SHIPPED | OUTPATIENT
Start: 2021-01-01 | End: 2021-01-01 | Stop reason: SDUPTHER

## 2021-01-01 RX ORDER — DEXTROSE MONOHYDRATE 100 MG/ML
75 INJECTION, SOLUTION INTRAVENOUS CONTINUOUS
Status: DISCONTINUED | OUTPATIENT
Start: 2021-01-01 | End: 2021-01-01

## 2021-01-01 RX ORDER — SODIUM CHLORIDE, SODIUM LACTATE, POTASSIUM CHLORIDE, CALCIUM CHLORIDE 600; 310; 30; 20 MG/100ML; MG/100ML; MG/100ML; MG/100ML
500 INJECTION, SOLUTION INTRAVENOUS ONCE
Status: COMPLETED | OUTPATIENT
Start: 2021-01-01 | End: 2021-01-01

## 2021-01-01 RX ORDER — CLONAZEPAM 1 MG/1
1 TABLET ORAL 2 TIMES DAILY
Start: 2021-01-01 | End: 2021-01-01 | Stop reason: SDUPTHER

## 2021-01-01 RX ORDER — FERROUS SULFATE 325(65) MG
325 TABLET ORAL DAILY
Qty: 90 TABLET | Refills: 1 | Status: SHIPPED | OUTPATIENT
Start: 2021-01-01

## 2021-01-01 RX ORDER — BISACODYL 10 MG
10 SUPPOSITORY, RECTAL RECTAL DAILY PRN
Status: DISCONTINUED | OUTPATIENT
Start: 2021-01-01 | End: 2021-01-01 | Stop reason: HOSPADM

## 2021-01-01 RX ORDER — SODIUM PHOSPHATE IN D5W 15MMOL/250
30 PLASTIC BAG, INJECTION (ML) INTRAVENOUS ONCE
Status: DISCONTINUED | OUTPATIENT
Start: 2021-01-01 | End: 2021-01-01

## 2021-01-01 RX ORDER — CLONAZEPAM 0.5 MG/1
1 TABLET ORAL 3 TIMES DAILY
Status: DISCONTINUED | OUTPATIENT
Start: 2021-01-01 | End: 2021-01-01 | Stop reason: HOSPADM

## 2021-01-01 RX ORDER — PANTOPRAZOLE SODIUM 40 MG/10ML
40 INJECTION, POWDER, LYOPHILIZED, FOR SOLUTION INTRAVENOUS 2 TIMES DAILY
Status: DISCONTINUED | OUTPATIENT
Start: 2021-01-01 | End: 2021-01-01 | Stop reason: HOSPADM

## 2021-01-01 RX ORDER — PROPOFOL 10 MG/ML
VIAL (ML) INTRAVENOUS AS NEEDED
Status: DISCONTINUED | OUTPATIENT
Start: 2021-01-01 | End: 2021-01-01 | Stop reason: SURG

## 2021-01-01 RX ORDER — HYDROCODONE BITARTRATE AND ACETAMINOPHEN 10; 325 MG/1; MG/1
1 TABLET ORAL EVERY 4 HOURS PRN
Status: DISCONTINUED | OUTPATIENT
Start: 2021-01-01 | End: 2021-01-01 | Stop reason: HOSPADM

## 2021-01-01 RX ORDER — LEVOFLOXACIN 5 MG/ML
250 INJECTION, SOLUTION INTRAVENOUS ONCE
Status: COMPLETED | OUTPATIENT
Start: 2021-01-01 | End: 2021-01-01

## 2021-01-01 RX ORDER — SODIUM CHLORIDE, SODIUM LACTATE, POTASSIUM CHLORIDE, CALCIUM CHLORIDE 600; 310; 30; 20 MG/100ML; MG/100ML; MG/100ML; MG/100ML
75 INJECTION, SOLUTION INTRAVENOUS CONTINUOUS
Status: DISCONTINUED | OUTPATIENT
Start: 2021-01-01 | End: 2021-01-01 | Stop reason: HOSPADM

## 2021-01-01 RX ORDER — EPHEDRINE SULFATE 50 MG/ML
INJECTION, SOLUTION INTRAVENOUS AS NEEDED
Status: DISCONTINUED | OUTPATIENT
Start: 2021-01-01 | End: 2021-01-01 | Stop reason: SURG

## 2021-01-01 RX ORDER — PROMETHAZINE HYDROCHLORIDE 12.5 MG/1
25 TABLET ORAL ONCE AS NEEDED
Status: DISCONTINUED | OUTPATIENT
Start: 2021-01-01 | End: 2021-01-01 | Stop reason: HOSPADM

## 2021-01-01 RX ORDER — LEVOFLOXACIN 500 MG/1
500 TABLET, FILM COATED ORAL DAILY
Qty: 7 TABLET | Refills: 0 | Status: SHIPPED | OUTPATIENT
Start: 2021-01-01 | End: 2021-01-01

## 2021-01-01 RX ORDER — ONDANSETRON 2 MG/ML
4 INJECTION INTRAMUSCULAR; INTRAVENOUS EVERY 6 HOURS PRN
Status: DISCONTINUED | OUTPATIENT
Start: 2021-01-01 | End: 2021-01-01 | Stop reason: HOSPADM

## 2021-01-01 RX ORDER — ACETAMINOPHEN 650 MG/1
650 SUPPOSITORY RECTAL EVERY 4 HOURS PRN
Status: DISCONTINUED | OUTPATIENT
Start: 2021-01-01 | End: 2021-01-01 | Stop reason: HOSPADM

## 2021-01-01 RX ORDER — HYDROCODONE BITARTRATE AND ACETAMINOPHEN 10; 325 MG/1; MG/1
1 TABLET ORAL EVERY 6 HOURS PRN
COMMUNITY

## 2021-01-01 RX ORDER — POTASSIUM CHLORIDE 7.45 MG/ML
10 INJECTION INTRAVENOUS
Status: COMPLETED | OUTPATIENT
Start: 2021-01-01 | End: 2021-01-01

## 2021-01-01 RX ORDER — PHENYLEPHRINE HCL IN 0.9% NACL 0.5 MG/5ML
.5-3 SYRINGE (ML) INTRAVENOUS
Status: DISCONTINUED | OUTPATIENT
Start: 2021-01-01 | End: 2021-01-01 | Stop reason: HOSPADM

## 2021-01-01 RX ORDER — PROMETHAZINE HYDROCHLORIDE 12.5 MG/1
12.5 TABLET ORAL ONCE AS NEEDED
Status: DISCONTINUED | OUTPATIENT
Start: 2021-01-01 | End: 2021-01-01 | Stop reason: HOSPADM

## 2021-01-01 RX ORDER — SODIUM CHLORIDE, SODIUM LACTATE, POTASSIUM CHLORIDE, CALCIUM CHLORIDE 600; 310; 30; 20 MG/100ML; MG/100ML; MG/100ML; MG/100ML
100 INJECTION, SOLUTION INTRAVENOUS CONTINUOUS
Status: DISCONTINUED | OUTPATIENT
Start: 2021-01-01 | End: 2021-01-01

## 2021-01-01 RX ORDER — ONDANSETRON 2 MG/ML
4 INJECTION INTRAMUSCULAR; INTRAVENOUS ONCE
Status: COMPLETED | OUTPATIENT
Start: 2021-01-01 | End: 2021-01-01

## 2021-01-01 RX ORDER — CLONAZEPAM 1 MG/1
1 TABLET ORAL 2 TIMES DAILY
Qty: 60 TABLET | Refills: 3 | Status: SHIPPED | OUTPATIENT
Start: 2021-01-01

## 2021-01-01 RX ORDER — ATROPINE SULFATE 10 MG/ML
2 SOLUTION/ DROPS OPHTHALMIC
Status: DISCONTINUED | OUTPATIENT
Start: 2021-01-01 | End: 2021-01-01 | Stop reason: HOSPADM

## 2021-01-01 RX ORDER — SODIUM CHLORIDE, SODIUM LACTATE, POTASSIUM CHLORIDE, CALCIUM CHLORIDE 600; 310; 30; 20 MG/100ML; MG/100ML; MG/100ML; MG/100ML
50 INJECTION, SOLUTION INTRAVENOUS CONTINUOUS
Status: DISCONTINUED | OUTPATIENT
Start: 2021-01-01 | End: 2021-01-01

## 2021-01-01 RX ORDER — NORTRIPTYLINE HYDROCHLORIDE 75 MG/1
150 CAPSULE ORAL NIGHTLY
Qty: 60 CAPSULE | Refills: 2 | Status: SHIPPED | OUTPATIENT
Start: 2021-01-01

## 2021-01-01 RX ORDER — OXYCODONE HYDROCHLORIDE 5 MG/1
5 TABLET ORAL
Status: DISCONTINUED | OUTPATIENT
Start: 2021-01-01 | End: 2021-01-01 | Stop reason: HOSPADM

## 2021-01-01 RX ORDER — LEVOFLOXACIN 5 MG/ML
750 INJECTION, SOLUTION INTRAVENOUS ONCE
Status: COMPLETED | OUTPATIENT
Start: 2021-01-01 | End: 2021-01-01

## 2021-01-01 RX ORDER — PROMETHAZINE HYDROCHLORIDE 25 MG/1
25 SUPPOSITORY RECTAL ONCE AS NEEDED
Status: DISCONTINUED | OUTPATIENT
Start: 2021-01-01 | End: 2021-01-01 | Stop reason: HOSPADM

## 2021-01-01 RX ORDER — HYDROXYZINE 50 MG/1
50 TABLET, FILM COATED ORAL NIGHTLY
COMMUNITY
End: 2021-01-01

## 2021-01-01 RX ORDER — MAGNESIUM SULFATE 1 G/100ML
1 INJECTION INTRAVENOUS
Status: DISPENSED | OUTPATIENT
Start: 2021-01-01 | End: 2021-01-01

## 2021-01-01 RX ORDER — AMOXICILLIN 250 MG
2 CAPSULE ORAL 2 TIMES DAILY
Status: DISCONTINUED | OUTPATIENT
Start: 2021-01-01 | End: 2021-01-01 | Stop reason: HOSPADM

## 2021-01-01 RX ORDER — HALOPERIDOL 2 MG/ML
0.5 SOLUTION ORAL
Status: DISCONTINUED | OUTPATIENT
Start: 2021-01-01 | End: 2021-01-01 | Stop reason: HOSPADM

## 2021-01-01 RX ORDER — MORPHINE SULFATE 2 MG/ML
2 INJECTION, SOLUTION INTRAMUSCULAR; INTRAVENOUS
Status: DISCONTINUED | OUTPATIENT
Start: 2021-01-01 | End: 2021-01-01 | Stop reason: HOSPADM

## 2021-01-01 RX ORDER — BISACODYL 10 MG
10 SUPPOSITORY, RECTAL RECTAL DAILY PRN
Status: DISCONTINUED | OUTPATIENT
Start: 2021-01-01 | End: 2021-01-01

## 2021-01-01 RX ORDER — CLONAZEPAM 1 MG/1
1 TABLET ORAL 3 TIMES DAILY
Status: ON HOLD | COMMUNITY
Start: 2021-01-01 | End: 2021-01-01 | Stop reason: SDUPTHER

## 2021-01-01 RX ORDER — IPRATROPIUM BROMIDE AND ALBUTEROL SULFATE 2.5; .5 MG/3ML; MG/3ML
3 SOLUTION RESPIRATORY (INHALATION)
Status: DISCONTINUED | OUTPATIENT
Start: 2021-01-01 | End: 2021-01-01 | Stop reason: HOSPADM

## 2021-01-01 RX ADMIN — SODIUM BICARBONATE 150 MEQ: 84 INJECTION, SOLUTION INTRAVENOUS at 22:27

## 2021-01-01 RX ADMIN — ACETAMINOPHEN 650 MG: 650 SUPPOSITORY RECTAL at 01:12

## 2021-01-01 RX ADMIN — METRONIDAZOLE 500 MG: 500 INJECTION, SOLUTION INTRAVENOUS at 00:48

## 2021-01-01 RX ADMIN — POTASSIUM CHLORIDE 20 MEQ: 29.8 INJECTION, SOLUTION INTRAVENOUS at 21:42

## 2021-01-01 RX ADMIN — I.V. FAT EMULSION 50 G: 20 EMULSION INTRAVENOUS at 14:00

## 2021-01-01 RX ADMIN — METRONIDAZOLE 500 MG: 500 INJECTION, SOLUTION INTRAVENOUS at 09:20

## 2021-01-01 RX ADMIN — CLONAZEPAM 1 MG: 0.5 TABLET ORAL at 21:32

## 2021-01-01 RX ADMIN — MORPHINE SULFATE 4 MG: 4 INJECTION INTRAVENOUS at 23:20

## 2021-01-01 RX ADMIN — MORPHINE SULFATE 2 MG: 2 INJECTION, SOLUTION INTRAMUSCULAR; INTRAVENOUS at 06:53

## 2021-01-01 RX ADMIN — IPRATROPIUM BROMIDE AND ALBUTEROL SULFATE 3 ML: .5; 2.5 SOLUTION RESPIRATORY (INHALATION) at 07:16

## 2021-01-01 RX ADMIN — MEROPENEM 1 G: 1 INJECTION, POWDER, FOR SOLUTION INTRAVENOUS at 09:38

## 2021-01-01 RX ADMIN — OXYCODONE HYDROCHLORIDE 5 MG: 5 TABLET ORAL at 09:09

## 2021-01-01 RX ADMIN — DOCUSATE SODIUM 50MG AND SENNOSIDES 8.6MG 2 TABLET: 8.6; 5 TABLET, FILM COATED ORAL at 08:54

## 2021-01-01 RX ADMIN — NICOTINE 1 PATCH: 21 PATCH, EXTENDED RELEASE TRANSDERMAL at 08:29

## 2021-01-01 RX ADMIN — POTASSIUM CHLORIDE 40 MEQ: 10 CAPSULE, COATED, EXTENDED RELEASE ORAL at 08:54

## 2021-01-01 RX ADMIN — SODIUM CHLORIDE 1 G: 9 INJECTION, SOLUTION INTRAVENOUS at 08:11

## 2021-01-01 RX ADMIN — POTASSIUM CHLORIDE 40 MEQ: 10 CAPSULE, COATED, EXTENDED RELEASE ORAL at 10:43

## 2021-01-01 RX ADMIN — I.V. FAT EMULSION 50 G: 20 EMULSION INTRAVENOUS at 09:34

## 2021-01-01 RX ADMIN — SODIUM CHLORIDE, PRESERVATIVE FREE 10 ML: 5 INJECTION INTRAVENOUS at 21:01

## 2021-01-01 RX ADMIN — HYDROCODONE BITARTRATE AND ACETAMINOPHEN 1 TABLET: 10; 325 TABLET ORAL at 10:51

## 2021-01-01 RX ADMIN — PANTOPRAZOLE SODIUM 40 MG: 40 INJECTION, POWDER, FOR SOLUTION INTRAVENOUS at 09:38

## 2021-01-01 RX ADMIN — SODIUM CHLORIDE, PRESERVATIVE FREE 10 ML: 5 INJECTION INTRAVENOUS at 09:38

## 2021-01-01 RX ADMIN — PHENYLEPHRINE HYDROCHLORIDE 0.5 MCG/KG/MIN: 10 INJECTION INTRAVENOUS at 03:32

## 2021-01-01 RX ADMIN — POTASSIUM CHLORIDE: 2 INJECTION, SOLUTION, CONCENTRATE INTRAVENOUS at 19:23

## 2021-01-01 RX ADMIN — SODIUM CHLORIDE, POTASSIUM CHLORIDE, SODIUM LACTATE AND CALCIUM CHLORIDE 100 ML/HR: 600; 310; 30; 20 INJECTION, SOLUTION INTRAVENOUS at 09:13

## 2021-01-01 RX ADMIN — SODIUM CHLORIDE, PRESERVATIVE FREE 10 ML: 5 INJECTION INTRAVENOUS at 22:02

## 2021-01-01 RX ADMIN — NOREPINEPHRINE BITARTRATE 0.3 MCG/KG/MIN: 1 INJECTION, SOLUTION, CONCENTRATE INTRAVENOUS at 10:00

## 2021-01-01 RX ADMIN — POTASSIUM PHOSPHATE, MONOBASIC AND POTASSIUM PHOSPHATE, DIBASIC 15 MMOL: 224; 236 INJECTION, SOLUTION, CONCENTRATE INTRAVENOUS at 14:21

## 2021-01-01 RX ADMIN — FENTANYL CITRATE 80 MCG: 50 INJECTION INTRAMUSCULAR; INTRAVENOUS at 07:50

## 2021-01-01 RX ADMIN — MORPHINE SULFATE 4 MG: 4 INJECTION INTRAVENOUS at 21:04

## 2021-01-01 RX ADMIN — SODIUM CHLORIDE, POTASSIUM CHLORIDE, SODIUM LACTATE AND CALCIUM CHLORIDE 100 ML/HR: 600; 310; 30; 20 INJECTION, SOLUTION INTRAVENOUS at 21:33

## 2021-01-01 RX ADMIN — MORPHINE SULFATE 2 MG: 2 INJECTION, SOLUTION INTRAMUSCULAR; INTRAVENOUS at 17:40

## 2021-01-01 RX ADMIN — CLONAZEPAM 1 MG: 0.5 TABLET ORAL at 16:49

## 2021-01-01 RX ADMIN — ASCORBIC ACID, VITAMIN A PALMITATE, CHOLECALCIFEROL, THIAMINE HYDROCHLORIDE, RIBOFLAVIN-5 PHOSPHATE SODIUM, PYRIDOXINE HYDROCHLORIDE, NIACINAMIDE, DEXPANTHENOL, ALPHA-TOCOPHEROL ACETATE, VITAMIN K1, FOLIC ACID, BIOTIN, CYANOCOBALAMIN: 200; 3300; 200; 6; 3.6; 6; 40; 15; 10; 150; 600; 60; 5 INJECTION, SOLUTION INTRAVENOUS at 18:16

## 2021-01-01 RX ADMIN — MEROPENEM 1 G: 1 INJECTION, POWDER, FOR SOLUTION INTRAVENOUS at 20:58

## 2021-01-01 RX ADMIN — MEROPENEM 1 G: 1 INJECTION, POWDER, FOR SOLUTION INTRAVENOUS at 09:49

## 2021-01-01 RX ADMIN — INSULIN LISPRO 3 UNITS: 100 INJECTION, SOLUTION INTRAVENOUS; SUBCUTANEOUS at 13:09

## 2021-01-01 RX ADMIN — INSULIN LISPRO 2 UNITS: 100 INJECTION, SOLUTION INTRAVENOUS; SUBCUTANEOUS at 09:38

## 2021-01-01 RX ADMIN — MEROPENEM 1 G: 1 INJECTION, POWDER, FOR SOLUTION INTRAVENOUS at 10:48

## 2021-01-01 RX ADMIN — SODIUM CHLORIDE, PRESERVATIVE FREE 10 ML: 5 INJECTION INTRAVENOUS at 08:13

## 2021-01-01 RX ADMIN — MAGNESIUM SULFATE HEPTAHYDRATE 1 G: 1 INJECTION, SOLUTION INTRAVENOUS at 13:08

## 2021-01-01 RX ADMIN — METRONIDAZOLE 500 MG: 500 INJECTION, SOLUTION INTRAVENOUS at 20:45

## 2021-01-01 RX ADMIN — INSULIN LISPRO 3 UNITS: 100 INJECTION, SOLUTION INTRAVENOUS; SUBCUTANEOUS at 19:26

## 2021-01-01 RX ADMIN — POTASSIUM CHLORIDE 20 MEQ: 29.8 INJECTION, SOLUTION INTRAVENOUS at 14:55

## 2021-01-01 RX ADMIN — INSULIN LISPRO 2 UNITS: 100 INJECTION, SOLUTION INTRAVENOUS; SUBCUTANEOUS at 21:03

## 2021-01-01 RX ADMIN — IPRATROPIUM BROMIDE AND ALBUTEROL SULFATE 3 ML: .5; 2.5 SOLUTION RESPIRATORY (INHALATION) at 00:04

## 2021-01-01 RX ADMIN — PANTOPRAZOLE SODIUM 40 MG: 40 INJECTION, POWDER, FOR SOLUTION INTRAVENOUS at 08:13

## 2021-01-01 RX ADMIN — PANTOPRAZOLE SODIUM 40 MG: 40 INJECTION, POWDER, FOR SOLUTION INTRAVENOUS at 20:58

## 2021-01-01 RX ADMIN — NOREPINEPHRINE BITARTRATE 0.4 MCG/KG/MIN: 1 INJECTION, SOLUTION, CONCENTRATE INTRAVENOUS at 17:30

## 2021-01-01 RX ADMIN — NOREPINEPHRINE BITARTRATE 0.04 MCG/KG/MIN: 1 INJECTION, SOLUTION, CONCENTRATE INTRAVENOUS at 18:04

## 2021-01-01 RX ADMIN — PANTOPRAZOLE SODIUM 40 MG: 40 INJECTION, POWDER, FOR SOLUTION INTRAVENOUS at 20:14

## 2021-01-01 RX ADMIN — METRONIDAZOLE 500 MG: 500 INJECTION, SOLUTION INTRAVENOUS at 09:07

## 2021-01-01 RX ADMIN — EPHEDRINE SULFATE 5 MG: 50 INJECTION INTRAVENOUS at 08:08

## 2021-01-01 RX ADMIN — SODIUM CHLORIDE, POTASSIUM CHLORIDE, SODIUM LACTATE AND CALCIUM CHLORIDE 75 ML/HR: 600; 310; 30; 20 INJECTION, SOLUTION INTRAVENOUS at 06:27

## 2021-01-01 RX ADMIN — CLONAZEPAM 1 MG: 0.5 TABLET ORAL at 08:54

## 2021-01-01 RX ADMIN — SODIUM BICARBONATE 150 MEQ: 84 INJECTION, SOLUTION INTRAVENOUS at 14:00

## 2021-01-01 RX ADMIN — SODIUM CHLORIDE 1 G: 9 INJECTION, SOLUTION INTRAVENOUS at 11:28

## 2021-01-01 RX ADMIN — MORPHINE SULFATE 2 MG: 2 INJECTION, SOLUTION INTRAMUSCULAR; INTRAVENOUS at 21:33

## 2021-01-01 RX ADMIN — POTASSIUM CHLORIDE 20 MEQ: 29.8 INJECTION, SOLUTION INTRAVENOUS at 17:38

## 2021-01-01 RX ADMIN — MORPHINE SULFATE 4 MG: 4 INJECTION INTRAVENOUS at 06:30

## 2021-01-01 RX ADMIN — DOCUSATE SODIUM 50MG AND SENNOSIDES 8.6MG 2 TABLET: 8.6; 5 TABLET, FILM COATED ORAL at 08:11

## 2021-01-01 RX ADMIN — POTASSIUM PHOSPHATE, MONOBASIC AND POTASSIUM PHOSPHATE, DIBASIC 15 MMOL: 224; 236 INJECTION, SOLUTION, CONCENTRATE INTRAVENOUS at 11:25

## 2021-01-01 RX ADMIN — IPRATROPIUM BROMIDE AND ALBUTEROL SULFATE 3 ML: .5; 2.5 SOLUTION RESPIRATORY (INHALATION) at 15:37

## 2021-01-01 RX ADMIN — POTASSIUM CHLORIDE: 2 INJECTION, SOLUTION, CONCENTRATE INTRAVENOUS at 21:06

## 2021-01-01 RX ADMIN — MORPHINE SULFATE 2 MG: 2 INJECTION, SOLUTION INTRAMUSCULAR; INTRAVENOUS at 15:29

## 2021-01-01 RX ADMIN — MORPHINE SULFATE 2 MG: 2 INJECTION, SOLUTION INTRAMUSCULAR; INTRAVENOUS at 08:11

## 2021-01-01 RX ADMIN — MORPHINE SULFATE 2 MG: 2 INJECTION, SOLUTION INTRAMUSCULAR; INTRAVENOUS at 22:32

## 2021-01-01 RX ADMIN — MORPHINE SULFATE 2 MG: 2 INJECTION, SOLUTION INTRAMUSCULAR; INTRAVENOUS at 04:46

## 2021-01-01 RX ADMIN — SODIUM CHLORIDE 1 G: 9 INJECTION, SOLUTION INTRAVENOUS at 10:40

## 2021-01-01 RX ADMIN — SODIUM CHLORIDE, PRESERVATIVE FREE 10 ML: 5 INJECTION INTRAVENOUS at 08:19

## 2021-01-01 RX ADMIN — IOHEXOL 250 ML: 12 SOLUTION ORAL at 17:52

## 2021-01-01 RX ADMIN — SODIUM CHLORIDE, PRESERVATIVE FREE 10 ML: 5 INJECTION INTRAVENOUS at 08:29

## 2021-01-01 RX ADMIN — IOPAMIDOL 80 ML: 755 INJECTION, SOLUTION INTRAVENOUS at 20:23

## 2021-01-01 RX ADMIN — SODIUM CHLORIDE, POTASSIUM CHLORIDE, SODIUM LACTATE AND CALCIUM CHLORIDE 100 ML/HR: 600; 310; 30; 20 INJECTION, SOLUTION INTRAVENOUS at 21:00

## 2021-01-01 RX ADMIN — POTASSIUM CHLORIDE 20 MEQ: 29.8 INJECTION, SOLUTION INTRAVENOUS at 20:14

## 2021-01-01 RX ADMIN — ENOXAPARIN SODIUM 40 MG: 40 INJECTION SUBCUTANEOUS at 18:39

## 2021-01-01 RX ADMIN — PANTOPRAZOLE SODIUM 40 MG: 40 INJECTION, POWDER, FOR SOLUTION INTRAVENOUS at 09:07

## 2021-01-01 RX ADMIN — SODIUM CHLORIDE, PRESERVATIVE FREE 10 ML: 5 INJECTION INTRAVENOUS at 20:15

## 2021-01-01 RX ADMIN — MORPHINE SULFATE 2 MG: 2 INJECTION, SOLUTION INTRAMUSCULAR; INTRAVENOUS at 13:30

## 2021-01-01 RX ADMIN — PANTOPRAZOLE SODIUM 40 MG: 40 INJECTION, POWDER, FOR SOLUTION INTRAVENOUS at 21:03

## 2021-01-01 RX ADMIN — PROPOFOL 20 MCG/KG/MIN: 10 INJECTION, EMULSION INTRAVENOUS at 23:52

## 2021-01-01 RX ADMIN — POTASSIUM PHOSPHATE, MONOBASIC AND POTASSIUM PHOSPHATE, DIBASIC 15 MMOL: 224; 236 INJECTION, SOLUTION, CONCENTRATE INTRAVENOUS at 15:35

## 2021-01-01 RX ADMIN — POTASSIUM CHLORIDE 10 MEQ: 7.46 INJECTION, SOLUTION INTRAVENOUS at 08:17

## 2021-01-01 RX ADMIN — SODIUM CHLORIDE, PRESERVATIVE FREE 10 ML: 5 INJECTION INTRAVENOUS at 21:32

## 2021-01-01 RX ADMIN — ASCORBIC ACID, VITAMIN A PALMITATE, CHOLECALCIFEROL, THIAMINE HYDROCHLORIDE, RIBOFLAVIN-5 PHOSPHATE SODIUM, PYRIDOXINE HYDROCHLORIDE, NIACINAMIDE, DEXPANTHENOL, ALPHA-TOCOPHEROL ACETATE, VITAMIN K1, FOLIC ACID, BIOTIN, CYANOCOBALAMIN: 200; 3300; 200; 6; 3.6; 6; 40; 15; 10; 150; 600; 60; 5 INJECTION, SOLUTION INTRAVENOUS at 20:07

## 2021-01-01 RX ADMIN — NICOTINE 1 PATCH: 21 PATCH, EXTENDED RELEASE TRANSDERMAL at 20:05

## 2021-01-01 RX ADMIN — SODIUM CHLORIDE, PRESERVATIVE FREE 10 ML: 5 INJECTION INTRAVENOUS at 20:55

## 2021-01-01 RX ADMIN — PROPOFOL 10 MCG/KG/MIN: 10 INJECTION, EMULSION INTRAVENOUS at 09:41

## 2021-01-01 RX ADMIN — INSULIN LISPRO 3 UNITS: 100 INJECTION, SOLUTION INTRAVENOUS; SUBCUTANEOUS at 18:23

## 2021-01-01 RX ADMIN — METRONIDAZOLE 500 MG: 500 INJECTION, SOLUTION INTRAVENOUS at 11:12

## 2021-01-01 RX ADMIN — SODIUM CHLORIDE, POTASSIUM CHLORIDE, SODIUM LACTATE AND CALCIUM CHLORIDE 100 ML/HR: 600; 310; 30; 20 INJECTION, SOLUTION INTRAVENOUS at 08:19

## 2021-01-01 RX ADMIN — SODIUM CHLORIDE, POTASSIUM CHLORIDE, SODIUM LACTATE AND CALCIUM CHLORIDE 100 ML/HR: 600; 310; 30; 20 INJECTION, SOLUTION INTRAVENOUS at 00:28

## 2021-01-01 RX ADMIN — SODIUM CHLORIDE 1000 ML: 9 INJECTION, SOLUTION INTRAVENOUS at 10:43

## 2021-01-01 RX ADMIN — MICAFUNGIN SODIUM 100 MG: 100 INJECTION, POWDER, LYOPHILIZED, FOR SOLUTION INTRAVENOUS at 12:59

## 2021-01-01 RX ADMIN — Medication 15 MMOL: at 17:10

## 2021-01-01 RX ADMIN — MORPHINE SULFATE 2 MG: 2 INJECTION, SOLUTION INTRAMUSCULAR; INTRAVENOUS at 22:01

## 2021-01-01 RX ADMIN — METRONIDAZOLE 500 MG: 500 INJECTION, SOLUTION INTRAVENOUS at 18:16

## 2021-01-01 RX ADMIN — NORTRIPTYLINE HYDROCHLORIDE 150 MG: 25 CAPSULE ORAL at 21:30

## 2021-01-01 RX ADMIN — MORPHINE SULFATE 4 MG: 4 INJECTION INTRAVENOUS at 02:32

## 2021-01-01 RX ADMIN — IOHEXOL 250 ML: 12 SOLUTION ORAL at 16:01

## 2021-01-01 RX ADMIN — DULOXETINE HYDROCHLORIDE 120 MG: 30 CAPSULE, DELAYED RELEASE ORAL at 21:30

## 2021-01-01 RX ADMIN — HYDROCODONE BITARTRATE AND ACETAMINOPHEN 1 TABLET: 10; 325 TABLET ORAL at 16:51

## 2021-01-01 RX ADMIN — MEROPENEM 1 G: 1 INJECTION, POWDER, FOR SOLUTION INTRAVENOUS at 17:14

## 2021-01-01 RX ADMIN — POTASSIUM CHLORIDE 10 MEQ: 7.46 INJECTION, SOLUTION INTRAVENOUS at 11:27

## 2021-01-01 RX ADMIN — MEROPENEM 1 G: 1 INJECTION, POWDER, FOR SOLUTION INTRAVENOUS at 18:39

## 2021-01-01 RX ADMIN — METRONIDAZOLE 500 MG: 500 INJECTION, SOLUTION INTRAVENOUS at 04:27

## 2021-01-01 RX ADMIN — PANTOPRAZOLE SODIUM 40 MG: 40 INJECTION, POWDER, FOR SOLUTION INTRAVENOUS at 21:00

## 2021-01-01 RX ADMIN — PROPOFOL 100 MG: 10 INJECTION, EMULSION INTRAVENOUS at 07:50

## 2021-01-01 RX ADMIN — INSULIN LISPRO 4 UNITS: 100 INJECTION, SOLUTION INTRAVENOUS; SUBCUTANEOUS at 12:20

## 2021-01-01 RX ADMIN — INSULIN LISPRO 2 UNITS: 100 INJECTION, SOLUTION INTRAVENOUS; SUBCUTANEOUS at 18:39

## 2021-01-01 RX ADMIN — METRONIDAZOLE 500 MG: 500 INJECTION, SOLUTION INTRAVENOUS at 17:34

## 2021-01-01 RX ADMIN — HYDROMORPHONE HYDROCHLORIDE 1 MG: 1 INJECTION, SOLUTION INTRAMUSCULAR; INTRAVENOUS; SUBCUTANEOUS at 20:54

## 2021-01-01 RX ADMIN — SODIUM CHLORIDE 1000 ML: 9 INJECTION, SOLUTION INTRAVENOUS at 20:54

## 2021-01-01 RX ADMIN — SODIUM CHLORIDE, PRESERVATIVE FREE 10 ML: 5 INJECTION INTRAVENOUS at 06:27

## 2021-01-01 RX ADMIN — PANTOPRAZOLE SODIUM 40 MG: 40 INJECTION, POWDER, FOR SOLUTION INTRAVENOUS at 19:39

## 2021-01-01 RX ADMIN — PANTOPRAZOLE SODIUM 40 MG: 40 INJECTION, POWDER, FOR SOLUTION INTRAVENOUS at 09:20

## 2021-01-01 RX ADMIN — INSULIN LISPRO 3 UNITS: 100 INJECTION, SOLUTION INTRAVENOUS; SUBCUTANEOUS at 21:53

## 2021-01-01 RX ADMIN — MORPHINE SULFATE 2 MG: 2 INJECTION, SOLUTION INTRAMUSCULAR; INTRAVENOUS at 10:50

## 2021-01-01 RX ADMIN — PANTOPRAZOLE SODIUM 40 MG: 40 INJECTION, POWDER, FOR SOLUTION INTRAVENOUS at 08:29

## 2021-01-01 RX ADMIN — SODIUM CHLORIDE, PRESERVATIVE FREE 10 ML: 5 INJECTION INTRAVENOUS at 09:20

## 2021-01-01 RX ADMIN — NICOTINE 1 PATCH: 21 PATCH, EXTENDED RELEASE TRANSDERMAL at 09:06

## 2021-01-01 RX ADMIN — METRONIDAZOLE 500 MG: 500 INJECTION, SOLUTION INTRAVENOUS at 00:26

## 2021-01-01 RX ADMIN — SODIUM CHLORIDE, PRESERVATIVE FREE 10 ML: 5 INJECTION INTRAVENOUS at 03:45

## 2021-01-01 RX ADMIN — METRONIDAZOLE 500 MG: 500 INJECTION, SOLUTION INTRAVENOUS at 15:18

## 2021-01-01 RX ADMIN — POTASSIUM CHLORIDE 10 MEQ: 7.46 INJECTION, SOLUTION INTRAVENOUS at 05:51

## 2021-01-01 RX ADMIN — PANTOPRAZOLE SODIUM 40 MG: 40 INJECTION, POWDER, FOR SOLUTION INTRAVENOUS at 20:55

## 2021-01-01 RX ADMIN — SODIUM CHLORIDE 1000 ML: 9 INJECTION, SOLUTION INTRAVENOUS at 22:27

## 2021-01-01 RX ADMIN — CALCIUM CHLORIDE 1 G: 100 INJECTION INTRAVENOUS; INTRAVENTRICULAR at 04:38

## 2021-01-01 RX ADMIN — METRONIDAZOLE 500 MG: 500 INJECTION, SOLUTION INTRAVENOUS at 15:33

## 2021-01-01 RX ADMIN — INSULIN LISPRO 2 UNITS: 100 INJECTION, SOLUTION INTRAVENOUS; SUBCUTANEOUS at 21:07

## 2021-01-01 RX ADMIN — I.V. FAT EMULSION 50 G: 20 EMULSION INTRAVENOUS at 12:19

## 2021-01-01 RX ADMIN — MORPHINE SULFATE 2 MG: 2 INJECTION, SOLUTION INTRAMUSCULAR; INTRAVENOUS at 15:31

## 2021-01-01 RX ADMIN — MORPHINE SULFATE 2 MG: 2 INJECTION, SOLUTION INTRAMUSCULAR; INTRAVENOUS at 10:43

## 2021-01-01 RX ADMIN — SODIUM CHLORIDE, PRESERVATIVE FREE 10 ML: 5 INJECTION INTRAVENOUS at 21:07

## 2021-01-01 RX ADMIN — LEVOFLOXACIN 750 MG: 750 INJECTION, SOLUTION INTRAVENOUS at 23:11

## 2021-01-01 RX ADMIN — DOCUSATE SODIUM 50MG AND SENNOSIDES 8.6MG 2 TABLET: 8.6; 5 TABLET, FILM COATED ORAL at 09:07

## 2021-01-01 RX ADMIN — SODIUM CHLORIDE 1 G: 9 INJECTION, SOLUTION INTRAVENOUS at 08:10

## 2021-01-01 RX ADMIN — HYDROCODONE BITARTRATE AND ACETAMINOPHEN 1 TABLET: 10; 325 TABLET ORAL at 21:41

## 2021-01-01 RX ADMIN — DOCUSATE SODIUM 50MG AND SENNOSIDES 8.6MG 1 TABLET: 8.6; 5 TABLET, FILM COATED ORAL at 21:31

## 2021-01-01 RX ADMIN — MORPHINE SULFATE 4 MG: 4 INJECTION INTRAVENOUS at 03:08

## 2021-01-01 RX ADMIN — MORPHINE SULFATE 4 MG: 4 INJECTION INTRAVENOUS at 16:52

## 2021-01-01 RX ADMIN — SODIUM CHLORIDE, POTASSIUM CHLORIDE, SODIUM LACTATE AND CALCIUM CHLORIDE 9 ML/HR: 600; 310; 30; 20 INJECTION, SOLUTION INTRAVENOUS at 07:29

## 2021-01-01 RX ADMIN — SODIUM CHLORIDE, POTASSIUM CHLORIDE, SODIUM LACTATE AND CALCIUM CHLORIDE 100 ML/HR: 600; 310; 30; 20 INJECTION, SOLUTION INTRAVENOUS at 22:00

## 2021-01-01 RX ADMIN — LEVOFLOXACIN 500 MG: 500 INJECTION, SOLUTION INTRAVENOUS at 02:37

## 2021-01-01 RX ADMIN — INSULIN LISPRO 2 UNITS: 100 INJECTION, SOLUTION INTRAVENOUS; SUBCUTANEOUS at 08:49

## 2021-01-01 RX ADMIN — SODIUM CHLORIDE, POTASSIUM CHLORIDE, SODIUM LACTATE AND CALCIUM CHLORIDE 500 ML: 600; 310; 30; 20 INJECTION, SOLUTION INTRAVENOUS at 00:11

## 2021-01-01 RX ADMIN — SODIUM CHLORIDE 125 ML/HR: 9 INJECTION, SOLUTION INTRAVENOUS at 11:26

## 2021-01-01 RX ADMIN — POTASSIUM CHLORIDE 10 MEQ: 7.46 INJECTION, SOLUTION INTRAVENOUS at 10:03

## 2021-01-01 RX ADMIN — SODIUM CHLORIDE, POTASSIUM CHLORIDE, SODIUM LACTATE AND CALCIUM CHLORIDE 100 ML/HR: 600; 310; 30; 20 INJECTION, SOLUTION INTRAVENOUS at 08:11

## 2021-01-01 RX ADMIN — SODIUM CHLORIDE, POTASSIUM CHLORIDE, SODIUM LACTATE AND CALCIUM CHLORIDE 75 ML/HR: 600; 310; 30; 20 INJECTION, SOLUTION INTRAVENOUS at 16:45

## 2021-01-01 RX ADMIN — SODIUM CHLORIDE, POTASSIUM CHLORIDE, SODIUM LACTATE AND CALCIUM CHLORIDE 100 ML/HR: 600; 310; 30; 20 INJECTION, SOLUTION INTRAVENOUS at 10:44

## 2021-01-01 RX ADMIN — SODIUM CHLORIDE, POTASSIUM CHLORIDE, SODIUM LACTATE AND CALCIUM CHLORIDE 1000 ML: 600; 310; 30; 20 INJECTION, SOLUTION INTRAVENOUS at 04:47

## 2021-01-01 RX ADMIN — CLONAZEPAM 1 MG: 0.5 TABLET ORAL at 10:51

## 2021-01-01 RX ADMIN — SODIUM CHLORIDE, PRESERVATIVE FREE 10 ML: 5 INJECTION INTRAVENOUS at 20:05

## 2021-01-01 RX ADMIN — SODIUM CHLORIDE 1 G: 9 INJECTION, SOLUTION INTRAVENOUS at 09:07

## 2021-01-01 RX ADMIN — SODIUM CHLORIDE 1000 ML: 9 INJECTION, SOLUTION INTRAVENOUS at 04:37

## 2021-01-01 RX ADMIN — IOPAMIDOL 100 ML: 755 INJECTION, SOLUTION INTRAVENOUS at 10:20

## 2021-01-01 RX ADMIN — SODIUM CHLORIDE, PRESERVATIVE FREE 10 ML: 5 INJECTION INTRAVENOUS at 09:07

## 2021-01-01 RX ADMIN — IPRATROPIUM BROMIDE AND ALBUTEROL SULFATE 3 ML: .5; 2.5 SOLUTION RESPIRATORY (INHALATION) at 18:27

## 2021-01-01 RX ADMIN — LEVOFLOXACIN 250 MG: 250 INJECTION, SOLUTION INTRAVENOUS at 21:32

## 2021-01-01 RX ADMIN — SODIUM CHLORIDE, PRESERVATIVE FREE 10 ML: 5 INJECTION INTRAVENOUS at 09:35

## 2021-01-01 RX ADMIN — IOPAMIDOL 100 ML: 755 INJECTION, SOLUTION INTRAVENOUS at 12:14

## 2021-01-01 RX ADMIN — VASOPRESSIN 0.03 UNITS/MIN: 20 INJECTION INTRAVENOUS at 02:57

## 2021-01-01 RX ADMIN — I.V. FAT EMULSION 50 G: 20 EMULSION INTRAVENOUS at 09:38

## 2021-01-01 RX ADMIN — MORPHINE SULFATE 2 MG: 2 INJECTION, SOLUTION INTRAMUSCULAR; INTRAVENOUS at 06:42

## 2021-01-01 RX ADMIN — ONDANSETRON 4 MG: 2 INJECTION INTRAMUSCULAR; INTRAVENOUS at 20:54

## 2021-01-01 RX ADMIN — SODIUM CHLORIDE, PRESERVATIVE FREE 10 ML: 5 INJECTION INTRAVENOUS at 08:11

## 2021-01-01 RX ADMIN — PROPOFOL 150 MCG/KG/MIN: 10 INJECTION, EMULSION INTRAVENOUS at 07:51

## 2021-01-01 RX ADMIN — SODIUM CHLORIDE 1 G: 9 INJECTION, SOLUTION INTRAVENOUS at 09:34

## 2021-01-01 RX ADMIN — IPRATROPIUM BROMIDE AND ALBUTEROL SULFATE 3 ML: .5; 2.5 SOLUTION RESPIRATORY (INHALATION) at 06:17

## 2021-01-01 RX ADMIN — LEVOFLOXACIN 250 MG: 5 INJECTION, SOLUTION INTRAVENOUS at 07:48

## 2021-01-01 RX ADMIN — PANTOPRAZOLE SODIUM 40 MG: 40 INJECTION, POWDER, FOR SOLUTION INTRAVENOUS at 21:21

## 2021-01-01 RX ADMIN — PANTOPRAZOLE SODIUM 40 MG: 40 INJECTION, POWDER, FOR SOLUTION INTRAVENOUS at 09:33

## 2021-01-01 RX ADMIN — SODIUM BICARBONATE 150 MEQ: 84 INJECTION, SOLUTION INTRAVENOUS at 04:12

## 2021-01-01 RX ADMIN — SODIUM CHLORIDE, POTASSIUM CHLORIDE, SODIUM LACTATE AND CALCIUM CHLORIDE 125 ML/HR: 600; 310; 30; 20 INJECTION, SOLUTION INTRAVENOUS at 17:49

## 2021-01-01 RX ADMIN — MAGNESIUM SULFATE HEPTAHYDRATE 1 G: 1 INJECTION, SOLUTION INTRAVENOUS at 17:05

## 2021-01-01 RX ADMIN — METRONIDAZOLE 500 MG: 500 INJECTION, SOLUTION INTRAVENOUS at 17:40

## 2021-01-01 RX ADMIN — POTASSIUM CHLORIDE 20 MEQ: 29.8 INJECTION, SOLUTION INTRAVENOUS at 15:59

## 2021-01-01 RX ADMIN — MEROPENEM 1 G: 1 INJECTION, POWDER, FOR SOLUTION INTRAVENOUS at 01:14

## 2021-01-01 RX ADMIN — METRONIDAZOLE 500 MG: 500 INJECTION, SOLUTION INTRAVENOUS at 23:59

## 2021-01-01 RX ADMIN — SODIUM CHLORIDE, PRESERVATIVE FREE 10 ML: 5 INJECTION INTRAVENOUS at 21:21

## 2021-01-01 RX ADMIN — SODIUM CHLORIDE 1 G: 9 INJECTION, SOLUTION INTRAVENOUS at 08:13

## 2021-01-01 RX ADMIN — SODIUM CHLORIDE, POTASSIUM CHLORIDE, SODIUM LACTATE AND CALCIUM CHLORIDE 75 ML/HR: 600; 310; 30; 20 INJECTION, SOLUTION INTRAVENOUS at 03:44

## 2021-01-01 RX ADMIN — SODIUM CHLORIDE, PRESERVATIVE FREE 10 ML: 5 INJECTION INTRAVENOUS at 21:04

## 2021-01-01 RX ADMIN — SODIUM CHLORIDE, PRESERVATIVE FREE 10 ML: 5 INJECTION INTRAVENOUS at 20:46

## 2021-01-01 RX ADMIN — METRONIDAZOLE 500 MG: 500 INJECTION, SOLUTION INTRAVENOUS at 21:03

## 2021-01-01 RX ADMIN — METRONIDAZOLE 500 MG: 500 INJECTION, SOLUTION INTRAVENOUS at 23:51

## 2021-01-01 RX ADMIN — Medication 0.02 MCG/KG/MIN: at 13:26

## 2021-01-01 RX ADMIN — NICOTINE 1 PATCH: 21 PATCH, EXTENDED RELEASE TRANSDERMAL at 09:35

## 2021-01-01 RX ADMIN — METRONIDAZOLE 500 MG: 500 INJECTION, SOLUTION INTRAVENOUS at 14:00

## 2021-01-01 RX ADMIN — NICOTINE 1 PATCH: 21 PATCH, EXTENDED RELEASE TRANSDERMAL at 13:08

## 2021-01-01 RX ADMIN — IPRATROPIUM BROMIDE AND ALBUTEROL SULFATE 3 ML: .5; 2.5 SOLUTION RESPIRATORY (INHALATION) at 18:26

## 2021-01-01 RX ADMIN — METRONIDAZOLE 500 MG: 500 INJECTION, SOLUTION INTRAVENOUS at 09:34

## 2021-01-01 RX ADMIN — SODIUM CHLORIDE, PRESERVATIVE FREE 10 ML: 5 INJECTION INTRAVENOUS at 10:44

## 2021-01-01 RX ADMIN — NICOTINE 1 PATCH: 21 PATCH, EXTENDED RELEASE TRANSDERMAL at 09:21

## 2021-05-10 NOTE — H&P
History and Physical      Patient Name: Breanna Smith   Patient ID: [id number]   Sex: Female   YOB: 1960    Primary Care Provider: Ligia KUNZ   Referring Provider: Ligia KUNZ    Visit Date: September 1, 2020    Provider: JOAQUINA Corado   Location: [facility]   Location Address: [address] [place] [address]  [id number]   Location Phone: [phone]          Chief Complaint  · pt here for urologic concerns      History Of Present Illness     59-year-old  female patient presents today for evaluation to establish care with urology.    The original referral for this patient states that the patient has interstitial cystitis.     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    The patient also has bilateral 7 [place] single J nephroureteral catheters.  The patient states that the last time they were changed was in April or May 2020.  She states that these were placed by her urologist in [place] related to concern for a fistula of some sort.    Per a review of the patient's records there seems to be some compliance issues with the patient previously as she had left AMA after being admitted to the hospital.    The patient also has a significant chronic health history including hypertension, recurrent urinary tract infections, chronic small bowel obstruction, anemia, multiple falls, and COPD.    The patient has a history of severe malnutrition requiring long-term TPN previously.    The patient has recently moved to [place] from [place] so that her son may assist her in her care.    Her son changes her urostomy bags.  The patient reports that she will need her nephroureteral catheters exchanged every 3 months.    She has a history of urostomy  stenosis.    She states she cannot tolerate any antibiotics aside from Levaquin and will not take anything aside from Levaquin for acute infections.    She currently uses [employer] for her urostomy supplies as she states she can no longer use any other brand as they cause skin breakdown.    The patient reports no family history of urologic cancers.    The patient is a 1-1/2 pack/day smoker x46 years       Past Medical History  Allergies; Anxiety; Arthritis; Bipolar 1 disorder; Bipolar affective disorder; Cataract; Chronic pain; Depression; Forgetfulness; Gall Stones; Heart Attack; Heart Murmur; Hemorrhoids; Hernia; High blood pressure; High cholesterol; Interstitial cystitis; Kidney stone; Migraine; Psychiatric disorder; Reflux; Seizure; Sinus trouble         Past Surgical History  Caesarian section; Gallbladder removal; Hernia Repair; Interstem Placement; Urostomy         Medication List  albuterol sulfate 90 mcg/actuation inhalation HFA aerosol inhaler; aspirin 81 mg oral tablet,delayed release (DR/EC); buspirone 5 mg oral tablet; cetirizine 10 mg oral tablet; clonazepam 1 mg oral tablet; Combivent Respimat  mcg/actuation inhalation mist; duloxetine 60 mg oral capsule,delayed release(DR/EC); fenofibrate 160 mg oral tablet; fluticasone propionate 50 mcg/actuation nasal spray,suspension; hydrocodone-acetaminophen 5-325 mg oral tablet; nortriptyline 75 mg oral capsule; omeprazole 20 mg oral capsule,delayed release(DR/EC); tramadol 50 mg oral tablet         Allergy List  Aleve; Biaxin; Cipro; clindamycin HCl; Flagyl; gemfibrozil; Keflex; Latuda; morphine; PENICILLINS; pravastatin; QUINOLONES; SULFA (SULFONAMIDES); TETRACYCLINES; Valium; vancomycin       Allergies Reconciled  Family Medical History  Family history of lung cancer; Family history of stroke; Family history of heart disease; Family history of diabetes mellitus; Family history of stomach cancer         Social History  Tobacco (Current every  "day)         Review of Systems  · Constitutional  o Denies  o : chills, fever  · Eyes  o Denies  o : yellowish discoloration of eyes  · HENT  o Denies  o : nose bleeding  · Breasts  o Denies  o : lumps, tenderness  · Cardiovascular  o Denies  o : chest pain on exertion  · Gastrointestinal  o Denies  o : nausea, vomiting, flank pain  · Genitourinary  o Denies  o : abnormal color of urine, blood in urine  · Integument  o Denies  o : rash  · Neurologic  o Denies  o : tingling or numbness      Vitals  Date Time BP Position Site L\R Cuff Size HR RR TEMP (F) WT  HT  BMI kg/m2 BSA m2 O2 Sat        09/01/2020 11:26 AM       15  109lbs 8oz 5'  4\" 18.8 1.5           Physical Examination  · Constitutional  o Appearance  o : cachetic, small body habitus, in no acute distress, appears chronically ill   · Head and Face  o Head  o :   § Inspection  § : atraumatic, normocephalic  o Face  o :   § Inspection  § : no facial lesions  · Eyes  o Sclerae  o : sclerae white  · Ears, Nose, Mouth and Throat  o Ears  o :   § External Ears  § : appearance within normal limits, no lesions present  o Nose  o :   § External Nose  § : appearance normal  · Neck  o Inspection/Palpation  o : normal appearance, trachea midline  · Respiratory  o Respiratory Effort  o : breathing unlabored  o Inspection of Chest  o : normal appearance, no retractions  · Gastrointestinal  o Abdominal Examination  o : abdomen distended with significant scarring, urostomy in LLQ draining well with yellow urine and some mucus in the bag as well as 2 black nephroureteral tubes   · Skin and Subcutaneous Tissue  o General Inspection  o : no rashes or lesions present, no lesions present, no areas of discoloration  · Neurologic  o Mental Status Examination  o :   § Orientation  § : grossly oriented to person, place and time  § Speech/Language  § : communication ability within normal limits  o Gait and Station  o : feeble gait present, unsteady when standing "   · Psychiatric  o Judgement and Insight  o : judgment and insight intact, judgement for everyday activities and social situations within normal limits, insight intact  o Mood and Affect  o : mood normal, affect appropriate              Assessment  · Nephrostomy complication     997.5/N99.528  · Presence of urostomy     V44.6/Z93.6  · Hydronephrosis     591/N13.30    Problems Reconciled  Plan  · Orders  o WOUND CARE CONSULTATION (WOUND) - V44.6/Z93.6 - 09/01/2020  o CT Abdomen and Pelvis (with and without Contrast) with Bosniak Class and delayed images (35520-EZ) - V44.6/Z93.6, 997.5/N99.528 - 09/01/2020  · Medications  o Medications have been Reconciled  o Transition of Care or Provider Policy  · Instructions  o Discussed with the patient that the complexity of her case will require referral to an MD. we will refer the patient to wound/ostomy care for her urostomy needs. The patient will also have new imaging performed in the form of an CT abdomen and pelvis with and without IV contrast with delayed imaging to determine if her previous hydronephrosis has resolved ot remains. Patient was made an appointment to follow-up with Dr. Frye to establish care and discuss exchange of nephroureteral stents.  o Electronically Identified Patient Education Materials Provided Electronically            Electronically Signed by: JOAQUINA Corado -Author on September 2, 2020 03:20:13 PM

## 2021-05-10 NOTE — H&P
History and Physical      Patient Name: Breanna Smith   Patient ID: [id number]   Sex: Female   YOB: 1960        Visit Date: August 20, 2020    Provider: JOAQUINA Love   Location: [facility]   Location Address: [address] [place] [address], [address] [place] [address]  [id number]   Location Phone: [phone]          Chief Complaint  · Est Care  · needs medications refilled. Been out of meds x 2 weeks.   · went to [facility] ER with back pain 2 days ago  · swelling in feet and legs  · skin very thin on arms. Abrasions and bruising  · moved here from [place]. Her son is now taking care of her. According to patient and son she was unable to care for herself and had become malnurished.      History Of Present Illness  Breanna Smith is a 59 year old /White female who presents for evaluation and treatment of: pt here to [facility]. she has reflux bad, back pain, IC and has urostomy since she was 33.had multiple falls and coccyx was painful. COPD, tobacco use, depression Bipolar,anxiety,h/o malnutrition. she has been out of all her meds for 2 weeks.       Past Medical History  Disease Name Date Onset Notes   Allergies --  --    Anxiety --  --    Arthritis --  --    Bipolar 1 disorder --  --    Bipolar affective disorder --  --    Cataract --  --    Chronic pain --  --    Depression --  --    Forgetfulness --  --    Gall Stones --  --    Heart Attack --  --    Heart Murmur --  --    Hemorrhoids --  --    Hernia --  --    High blood pressure --  --    High cholesterol --  --    Interstitial cystitis --  --    Kidney stone --  --    Migraine --  --    Psychiatric disorder --  --    Reflux --  --    Seizure --  --    Sinus trouble --  --          Past Surgical History  Procedure Name Date Notes   Caesarian section --  --    Gallbladder removal --  --    Hernia Repair --  --    Urostomy --  --          Medication List  Name Date Started Instructions   albuterol sulfate 90 mcg/actuation  inhalation HFA aerosol inhaler 08/20/2020 inhale 2 puffs (180 mcg) by inhalation route every 4 hours for 30 days   aspirin 81 mg oral tablet,delayed release (DR/EC) 08/20/2020 take 1 tablet (81 mg) by oral route once daily for 30 days   cetirizine 10 mg oral tablet 08/20/2020 take 1 tablet by oral route once a day (at bedtime) for 30 days   clonazepam 1 mg oral tablet 08/20/2020 take 1 tablet by oral route three times a day as needed   duloxetine 60 mg oral capsule,delayed release(DR/EC) 08/20/2020 take 2 capsules (120 mg) by oral route once daily for 30 days   fenofibrate 160 mg oral tablet 08/20/2020 take 1 tablet (160 mg) by oral route once daily for 30 days   fluticasone propionate 50 mcg/actuation nasal spray,suspension 08/20/2020 inhale 1 puff by nasal route 2 times a day for 30 days   hydrocodone-acetaminophen 5-325 mg oral tablet 08/20/2020 tab 1 po every 8 hours   nortriptyline 75 mg oral capsule 08/20/2020 take 2 capsules (150 mg) by oral route once daily for 30 days   Tudorza Pressair 400 mcg/actuation inhalation aerosol powdr breath activated 08/20/2020 inhale 1 puff (400 mcg) by inhalation route every 12 hours for 30 days         Allergy List  Allergen Name Date Reaction Notes   Aleve --  --  --    Biaxin --  --  --    Cipro --  --  --    clindamycin HCl --  --  --    Flagyl --  --  --    gemfibrozil --  --  --    Keflex --  --  --    Latuda --  --  --    morphine --  --  --    PENICILLINS --  --  --    pravastatin --  myalgia --    QUINOLONES --  --  --    SULFA (SULFONAMIDES) --  --  --    TETRACYCLINES --  --  --    Valium --  --  --    vancomycin --  --  --          Family Medical History  Disease Name Relative/Age Notes   Family history of lung cancer  --    Family history of stroke Father/   --    Family history of heart disease Father/  Mother/   --    Family history of diabetes mellitus  --    Family history of stomach cancer Father/   --          Social History  Finding Status Start/Stop  Quantity Notes   Tobacco Current every day --/-- --  --          Review of Systems  · Constitutional  o Denies  o : fatigue, night sweats  · Eyes  o Denies  o : double vision, blurred vision  · HENT  o Denies  o : vertigo, recent head injury  · Breasts  o Denies  o : abnormal changes in breast size, additional breast symptoms except as noted in the HPI  · Cardiovascular  o Admits  o : she's had some swelling in her legs but its improved now that she's keeping her legs elevated  o Denies  o : chest pain, irregular heart beats  · Respiratory  o Denies  o : shortness of breath, productive cough  · Gastrointestinal  o Denies  o : nausea, vomiting  · Genitourinary  o Admits  o : she feels her stoma on the urostomy is infected as her urine is really fowl. she has not had tubes changed and has a hard knot on the R side of the area. last check was in april. she's had this resulting from IC since she was 33  o Denies  o : dysuria  · Integument  o Denies  o : hair growth change, new skin lesions  · Neurologic  o Admits  o : she does have some tingling in her feet and legs  o Denies  o : altered mental status, seizures  · Musculoskeletal  o Admits  o : she has chronic back pain and has a stem in it. she's had several falls in her home in [place] and says her legs would just give out.  o Denies  o : joint swelling, limitation of motion  · Endocrine  o Denies  o : cold intolerance, heat intolerance  · Psychiatric  o Admits  o : anxiety, depression. she's been on pamelor for years and feels it helps her. she has anxiousness and has been on klonopin for years. sometimes when she wakes up she will suddenly feel panicked and not sure why. she uses it as needed. never been on any other anti anxiety med  o Denies  o : difficulty sleeping, impulsive behaviors, suicidal ideation, excessive anger  · Heme-Lymph  o Denies  o : petechiae, lymph node enlargement or tenderness  · Allergic-Immunologic  o Denies  o : frequent  "illnesses      Vitals  Date Time BP Position Site L\R Cuff Size HR RR TEMP (F) WT  HT  BMI kg/m2 BSA m2 O2 Sat        08/20/2020 02:35 /44 Sitting    80 - R  97 106lbs 7oz 5'  4\" 18.27 1.48 97 %          Physical Examination  · Constitutional  o Appearance  o : well-nourished, well developed, alert, in no acute distress  · Head and Face  o Face  o :   § Inspection  § : no facial lesions  § Palpation  § : no sinus tenderness on palpation  · Eyes  o Conjunctivae  o : conjunctivae normal  o Sclerae  o : sclerae white  o Pupils and Irises  o : pupils equal, round, and reactive to light and accommodation bilaterally  o Eyelids/Ocular Adnexae  o : eyelid appearance normal, no exudates present, eye moisture level normal  · Ears, Nose, Mouth and Throat  o Ears  o : external ear auricle normal, otic canal normal, TM with no reddness, effusion, retraction  o Nose  o : external normal, nasal mucosa normal, turbinates normal  o Oral Cavity  o : tongue no lesion, oral mucosa normal,upper and lower dentures  o Throat  o : no erythemia, exudate or lesions  · Neck  o Inspection/Palpation  o : normal appearance, no masses or tenderness, trachea midline, no enlarged cervical or supraclavicular lymphnodes palpated  o Thyroid  o : gland size normal, nontender, no nodules or masses present on palpation, thyroid motion normal during swallowing  · Respiratory  o Respiratory Effort  o : breathing unlabored  o Inspection of Chest  o : normal appearance, no retractions  o Auscultation of Lungs  o : normal breath sounds throughout  · Cardiovascular  o Heart  o :   § Auscultation of Heart  § : regular rate and rhythm without murmur  o Peripheral Vascular System  o :   § Carotid Arteries  § : normal pulses bilaterally, no bruits noted  § Pedal Pulses  § : pulses 2 bilaterally  § Extremities  § : no edema, no cyanosis, no distal hair loss, normal capillary refill  · Gastrointestinal  o Abdominal Examination  o : abdomen nontender to " palpation. noted R side of urostomy site hard nodule present and is tender to palpation, no erythema. stoma is red  · Musculoskeletal  o General  o :   § General Musculoskeletal  § : No joint swelling or deformity., Muscle tone, strength, and development grossly normal.  o Spine  o :   § Inspection/Palpation  § : tenderness to palpation present   · Skin and Subcutaneous Tissue  o General Inspection  o : skin yellowish coloring, thin and taut on her arms and hands  · Neurologic  o Mental Status Examination  o : judgement, insight intact, modd and affect appropriate, good eye contact. she says her memory is not as good as it use to be. is sleeping better since she came here.she's had anxiety for along time and does okay with the medication. no SI, has not tried to hurt herself.  o Motor Examination  o : strength grossly intact in all four extremities  o Gait and Station  o : slow gait, able to stand without difficulty     urine had foul odor           Results  · In-Office Procedures  o Lab procedure  § IOP - Urine Drug Screen In-House [facility] (55164)   § Amphetamines Ur Ql: Negative   § Barbiturates Ur Ql: Negative   § Buprenorphine+Nor Ur Ql Scn: Negative   § Benzodiaz Ur Ql: Negative   § Cocaine Ur Ql: Negative   § Methadone Ur Ql: Negative   § Methamphet Ur Ql: Negative   § MDMA Ur Ql Scn: Negative   § Opiates Ur Ql: Negative   § Oxycodone Ur Ql: Negative   § PCP Ur Ql: Negative   § THC Ur Ql: Negative   § Temp in Range?: Within/Acceptable   § Control Seen?: Yes       Assessment  · Anxiety disorder     300.00/F41.9  · COPD (chronic obstructive pulmonary disease)     496/J44.9  · Depression     311/F32.9  · Fatigue     780.79/R53.83  · Nicotine dependence     305.1/F17.200  · Tobacco abuse counseling       Tobacco abuse counseling     V65.42/Z71.6  · Urinary tract infection     599.0/N39.0  · Screening for cardiovascular condition     V81.2/Z13.6  · Chronic pain     338.29/G89.29  · Chronic  bronchitis     491.9/J42  · Bipolar affective disorder     296.80/F31.9  · Establishing care with new doctor, encounter for     V65.8/Z76.89  · Interstitial cystitis     595.1/N30.10      Plan  · Orders  o PULMONARY CONSULTATION (PULMO) - 496/J44.9, 491.9/J42 - 08/22/2020  o Smoking cessation counseling, 3-10 minutes [facility] (92030) - 305.1/F17.200 - 08/20/2020  o ACO-17: Screened for tobacco use AND received tobacco cessation intervention (4004F) - 305.1/F17.200 - 08/20/2020  o ACO-17: Screened for tobacco use AND received tobacco cessation intervention (4004F) - V65.42/Z71.6 - 08/20/2020  o Hgb A1c [facility] (83371) - - 08/20/2020  o Physical, Primary Care Panel (CBC, CMP, Lipid, TSH) [facility] (86681, 33689, 52529, 34243) - V81.2/Z13.6, 780.79/R53.83 - 08/20/2020  o Vitamin D (25-Hydroxy) Level (08567) - - 08/20/2020  o JACKIE Report (KASPR) - - 08/20/2020  o ACO-17: Screened for tobacco use AND received tobacco cessation intervention (4004F) - - 08/20/2020  o ACO-39: Current medications updated and reviewed () - - 08/20/2020  o ACO-18: Positive screen for clinical depression using a standardized tool and a follow-up plan documented () - - 08/20/2020  o ACO-13: Fall Risk Screening with 2 or more falls in past year or any fall with injury in the past year (1100F) - - 08/20/2020  o PAIN MANAGEMENT CONSULTATION (PAINM) - 338.29/G89.29 - 08/21/2020  o PULMONARY CONSULTATION (PULMO) - - 08/20/2020  o UROLOGY CONSULTATION (UROLO) - - 08/23/2020   urostomy  o PAIN MANAGEMENT CONSULTATION (PAINM) - - 08/20/2020  o B12 Folate levels (B12FO) - 780.79/R53.83 - 08/20/2020  · Medications  o buspirone 5 mg oral tablet   SIG: take 1 tablet (5 mg) by oral route daily   DISP: (15) tablets with 0 refills  Prescribed on 08/20/2020     o omeprazole 20 mg oral capsule,delayed release(DR/EC)   SIG: take 1 capsule (20 mg) by oral route once daily 30 minutes to 1 hour before a meal for 30 days   DISP: (30) capsules with 3 refills  Prescribed  on 08/20/2020     o Levaquin 500 mg oral tablet   SIG: take 1 tablet (500 mg) by oral route once daily for 10 days   DISP: (10) tablets with 0 refills  Adjusted on 08/20/2020     o albuterol sulfate 90 mcg/actuation inhalation HFA aerosol inhaler   SIG: inhale 2 puffs (180 mcg) by inhalation route every 4 hours for 30 days   DISP: (1) 6.7 gm canister with 3 refills  Adjusted on 08/20/2020     o aspirin 81 mg oral tablet,delayed release (DR/EC)   SIG: take 1 tablet (81 mg) by oral route once daily for 30 days   DISP: (30) tablet with 3 refills  Adjusted on 08/20/2020     o cetirizine 10 mg oral tablet   SIG: take 1 tablet by oral route once a day (at bedtime) for 30 days   DISP: (30) tablet with 3 refills  Adjusted on 08/20/2020     o clonazepam 1 mg oral tablet   SIG: take 1 tablet by oral route three times a day as needed   DISP: (90) tablet with 0 refills  Adjusted on 08/20/2020     o duloxetine 60 mg oral capsule,delayed release(DR/EC)   SIG: take 2 capsules (120 mg) by oral route once daily for 30 days   DISP: (60) capsule with 3 refills  Adjusted on 08/20/2020     o fenofibrate 160 mg oral tablet   SIG: take 1 tablet (160 mg) by oral route once daily for 30 days   DISP: (30) tablet with 3 refills  Adjusted on 08/20/2020     o fluticasone propionate 50 mcg/actuation nasal spray,suspension   SIG: inhale 1 puff by nasal route 2 times a day for 30 days   DISP: (1) 9.9 ml aer w/adap with 3 refills  Adjusted on 08/20/2020     o hydrocodone-acetaminophen 5-325 mg oral tablet   SIG: tab 1 po every 8 hours   DISP: (90) tablet with 0 refills  Adjusted on 08/20/2020     o nortriptyline 75 mg oral capsule   SIG: take 2 capsules (150 mg) by oral route once daily for 30 days   DISP: (60) capsule with 3 refills  Adjusted on 08/20/2020     o Tudorza Pressair 400 mcg/actuation inhalation aerosol powdr breath activated   SIG: inhale 1 puff (400 mcg) by inhalation route every 12 hours for 30 days   DISP: (1) Aer br.act with 3  "refills  Adjusted on 08/20/2020     o tramadol 50 mg oral tablet   SIG: take 1 tablet by oral route every 8 hours as needed   DISP: (0) tablet with 0 refills  Discontinued on 08/20/2020     o Medications have been Reconciled  o Transition of Care or Provider Policy  · Instructions  o Discussed the need for therapy, either with a certified counselor, psychologist, and/or family . If no improvement is noted or worsening of their condition, return to office or ER. But also discussed with patient that if they are non-responsive to the type of medication they may need to see a psychiatrist for further evaluation and management.  o Patient agrees to a \"No Self Harm\" contract. Patient will either call , [facility], ER, [facility], [facility].  o *Form of nicotine being used:   o Patient was strongly encouraged to discontinue use of any nicotine containing product or minimize the use of the product.  o Tobacco and smoking cessation counseling for more than 3 minutes was completed.  o Patient was educated/instructed on their diagnosis, treatment and medications prior to discharge from the clinic today.  o discussed smoking decrease and pt stated she was not going to stop smoking. gave the risks of worsening COPD , chronic bronchitis and possible lung cancer and she stated she knew. if she ever wants help just let me know  o will fill pt pain med this month. has a referral for pain mgmt  · Disposition  o Follow up in 3 months            Electronically Signed by: Ligia Andrade, APRN -Author on August 21, 2020 07:04:05 AM  "
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
## 2021-05-10 NOTE — H&P
History and Physical      Patient Name: Breanna Smith   Patient ID: [id number]   Sex: Female   YOB: 1960    Primary Care Provider: Ligia KUNZ   Referring Provider: Ligia KUNZ    Visit Date: October 9, 2020    Provider: Ridge Frye MD   Location: [facility]   Location Address: [address] [place] [address]  [id number]   Location Phone: [phone]          Chief Complaint  · Outpatient History & Physical / Surgical Orders      History Of Present Illness  [facility]  Outpatient History and Physical Surgical Orders  Preadmission Location: Phone Preadmission Time: 10:00 AM   Which Facility: [facility] Surgery Date: 11/04/2020 Preadmission Testing Date: 10/30/2020   Patient's Name: Breanna Smith YOB: 1960   Chief complaint/history present illness: Hydronephrosis   Current Medication List: albuterol sulfate 90 mcg/actuation inhalation HFA aerosol inhaler, aspirin 81 mg oral tablet,delayed release (DR/EC), buspirone 5 mg oral tablet, cetirizine 10 mg oral tablet, clonazepam 1 mg oral tablet, Combivent Respimat  mcg/actuation inhalation mist, duloxetine 60 mg oral capsule,delayed release(DR/EC), fenofibrate 160 mg oral tablet, ferrous sulfate 325 mg (65 mg iron) oral tablet, fluticasone propionate 50 mcg/actuation nasal spray,suspension, hydrocodone-acetaminophen 7.5-325 mg oral tablet, levofloxacin 250 mg oral tablet, nortriptyline 75 mg oral capsule, omeprazole 20 mg oral capsule,delayed release(DR/EC), Vitamin D2 1,250 mcg (50,000 unit) oral capsule, and wheelchair with leg rest 0   Allergies: Aleve, Biaxin, Cipro, clindamycin HCl, Flagyl, gemfibrozil, Keflex, Latuda, morphine, PENICILLINS, pravastatin, QUINOLONES, SULFA (SULFONAMIDES), TETRACYCLINES, Valium, and vancomycin   Significant past medical: Allergies, Anxiety, Arthritis, Bipolar 1 disorder, Bipolar affective disorder, Cataract,  Chronic pain, COPD, Depression, Forgetfulness, Gall Stones, Heart Attack, Heart Murmur, Hemorrhoids, Hernia, High blood pressure, High cholesterol, Hydronephrosis, Interstitial cystitis, Kidney stone, Migraine, Psychiatric disorder, Reflux, Seizure, and Sinus trouble   Past Surgical History: Caesarian section, Gallbladder removal, Hernia Repair, Interstem Placement, Nephrostomy With Tube Drainage, and Urostomy   Examination of heart and lungs: Regular rate, rhythm, no murmur, gallop, rub, Breath sounds normal, no distress, and Abdomen soft, non-tender, BSx4 are positive         Past Medical History  Allergies; Anxiety; Arthritis; Bipolar 1 disorder; Bipolar affective disorder; Cataract; Chronic pain; COPD; Depression; Forgetfulness; Gall Stones; Heart Attack; Heart Murmur; Hemorrhoids; Hernia; High blood pressure; High cholesterol; Hydronephrosis; Interstitial cystitis; Kidney stone; Migraine; Psychiatric disorder; Reflux; Seizure; Sinus trouble         Past Surgical History  Caesarian section; Gallbladder removal; Hernia Repair; Interstem Placement; Nephrostomy With Tube Drainage; Urostomy         Medication List  albuterol sulfate 90 mcg/actuation inhalation HFA aerosol inhaler; aspirin 81 mg oral tablet,delayed release (DR/EC); buspirone 5 mg oral tablet; cetirizine 10 mg oral tablet; clonazepam 1 mg oral tablet; Combivent Respimat  mcg/actuation inhalation mist; duloxetine 60 mg oral capsule,delayed release(DR/EC); fenofibrate 160 mg oral tablet; ferrous sulfate 325 mg (65 mg iron) oral tablet; fluticasone propionate 50 mcg/actuation nasal spray,suspension; hydrocodone-acetaminophen 7.5-325 mg oral tablet; levofloxacin 250 mg oral tablet; nortriptyline 75 mg oral capsule; omeprazole 20 mg oral capsule,delayed release(DR/EC); Vitamin D2 1,250 mcg (50,000 unit) oral capsule; wheelchair with leg rest 0         Allergy List  Aleve; Biaxin; Cipro; clindamycin HCl; Flagyl; gemfibrozil; Keflex; Latuda; morphine;  PENICILLINS; pravastatin; QUINOLONES; SULFA (SULFONAMIDES); TETRACYCLINES; Valium; vancomycin       Allergies Reconciled  Family Medical History  Family history of lung cancer; Family history of stroke; Family history of heart disease; Family history of diabetes mellitus; Family history of stomach cancer         Social History  Tobacco (Current every day)             Assessment  · Pre-Surgical Orders     V72.84  · Preop testing     V72.84/Z01.818      Plan  · Orders  o General Urology Surgery Order ([facility]) - V72.84 - 11/04/2020  o [facility] Pre-Op Covid-19 Screening (47648) - V72.84/Z01.818 - 10/30/2020   0900 at [facility]  · Medications  o Medications have been Reconciled  o Transition of Care or Provider Policy  · Instructions  o *****Surgical Orders******  o Pre-Operative Orders: Sign permit for Ileoscopy with right ureteral stent exchange  o Outpatient   o Gentamicin 200 mg IV on-call to the OR  o RISK AND BENEFITS:  o Possible risks/complications, benefits and alternatives to surgical or invasive procedure have been explained to patient and/or legal guardian.  o Electronically Identified Patient Education Materials Provided Electronically            Electronically Signed by: Ridge Frye MD -Author on November 2, 2020 07:20:12 AM

## 2021-05-10 NOTE — H&P
History and Physical      Patient Name: Breanna Smith   Patient ID: [id number]   Sex: Female   YOB: 1960    Primary Care Provider: Ligia Andrade APRN    Visit Date: January 25, 2021    Provider: Ridge Frye MD   Location: [facility]   Location Address: [address] [place] [address]  [id number]   Location Phone: [phone]          Chief Complaint  · Outpatient History & Physical / Surgical Orders      History Of Present Illness  [facility]  Outpatient History and Physical Surgical Orders  Preadmission Location: [facility] Preadmission Time: 09:00 AM   Which Facility: [facility] Surgery Date: 02/24/2021 Preadmission Testing Date: 02/04/2021   Patient's Name: Breanna Smith YOB: 1960   Chief complaint/history present illness: pain with interstim, nephrostomy complications, hydronephrosis   Current Medication List: aspirin 81 mg oral tablet,delayed release (DR/EC), cetirizine 10 mg oral tablet, clonazepam 1 mg oral tablet, Combivent Respimat  mcg/actuation inhalation mist, Daily Multi-Vitamin oral tablet, duloxetine 60 mg oral capsule,delayed release(DR/EC), fenofibrate 160 mg oral tablet, ferrous sulfate 325 mg (65 mg iron) oral tablet, fluticasone propionate 50 mcg/actuation nasal spray,suspension, hydroxyzine HCl 50 mg oral tablet, nortriptyline 75 mg oral capsule, omeprazole 20 mg oral capsule,delayed release(DR/EC), oxycodone-acetaminophen 2.5-300 mg oral tablet, and wheelchair with leg rest 0   Allergies: Aleve, Biaxin, Cipro, clindamycin HCl, Flagyl, gemfibrozil, Keflex, Latuda, PENICILLINS, pravastatin, QUINOLONES, SULFA (SULFONAMIDES), TETRACYCLINES, and vancomycin   Significant past medical: Allergies, Anxiety, Arthritis, Bipolar 1 disorder, Bipolar affective disorder, Cataract, Chronic pain, COPD, Depression, Forgetfulness, Gall Stones, Heart Attack, Heart Murmur, Hemorrhoids,  Hernia, High blood pressure, High cholesterol, Hydronephrosis, Interstitial cystitis, Kidney stone, Migraine, Psychiatric disorder, Reflux, Seizure, and Sinus trouble   Past Surgical History: Caesarian section, Gallbladder removal, Hernia Repair, Interstem Placement, Nephrostomy With Tube Drainage, and Urostomy   Examination of heart and lungs: Regular rate, rhythm, no murmur, gallop, rub, Breath sounds normal, no distress, and Abdomen soft, non-tender, BSx4 are positive         Past Medical History  Allergies; Anxiety; Arthritis; Bipolar 1 disorder; Bipolar affective disorder; Cataract; Chronic pain; COPD; Depression; Forgetfulness; Gall Stones; Heart Attack; Heart Murmur; Hemorrhoids; Hernia; High blood pressure; High cholesterol; Hydronephrosis; Interstitial cystitis; Kidney stone; Migraine; Psychiatric disorder; Reflux; Seizure; Sinus trouble         Past Surgical History  Caesarian section; Gallbladder removal; Hernia Repair; Interstem Placement; Nephrostomy With Tube Drainage; Urostomy         Medication List  aspirin 81 mg oral tablet,delayed release (DR/EC); cetirizine 10 mg oral tablet; clonazepam 1 mg oral tablet; Combivent Respimat  mcg/actuation inhalation mist; Daily Multi-Vitamin oral tablet; duloxetine 60 mg oral capsule,delayed release(DR/EC); fenofibrate 160 mg oral tablet; ferrous sulfate 325 mg (65 mg iron) oral tablet; fluticasone propionate 50 mcg/actuation nasal spray,suspension; hydroxyzine HCl 50 mg oral tablet; levofloxacin 250 mg oral tablet; nortriptyline 75 mg oral capsule; omeprazole 20 mg oral capsule,delayed release(DR/EC); oxycodone-acetaminophen 2.5-300 mg oral tablet; wheelchair with leg rest 0         Allergy List  Aleve; Biaxin; Cipro; clindamycin HCl; Flagyl; gemfibrozil; Keflex; Latuda; PENICILLINS; pravastatin; QUINOLONES; SULFA (SULFONAMIDES); TETRACYCLINES; vancomycin         Family Medical History  Family history of lung cancer; Family history of stroke; Family history  of heart disease; Family history of diabetes mellitus; Family history of stomach cancer         Social History  Tobacco (Current every day)             Assessment  · Pre-Surgical Orders     V72.84  · Preop testing     V72.84/Z01.818      Plan  · Orders  o General Urology Surgery Order ([facility]U) - V72.84 - 02/24/2021  o EKG (Recording only) (25174) - V72.84/Z01.818 - 02/04/2021  o [facility] Pre-Op Covid-19 Screening (95299) - V72.84/Z01.818 - 02/19/2021  · Medications  o Medications have been Reconciled  o Transition of Care or Provider Policy  · Instructions  o *****Surgical Orders******  o Pre-Operative Orders: Sign permit for removal of interstim, ileoscopy, single J ureteral stent exchange  o Outpatient   o Levaquin 250 mg IV OCTOR.  o RISK AND BENEFITS:  o Possible risks/complications, benefits and alternatives to surgical or invasive procedure have been explained to patient and/or legal guardian.            Electronically Signed by: Ridge Frye MD -Author on February 12, 2021 04:07:43 PM

## 2021-05-13 NOTE — PROGRESS NOTES
Progress Note      Patient Name: Breanna Smith   Patient ID: [id number]   Sex: Female   YOB: 1960    Primary Care Provider: Ligia KUNZ   Referring Provider: Ligia KUNZ    Visit Date: October 9, 2020    Provider: Ridge Frye MD   Location: [facility]   Location Address: [address] [place] [address]  [id number]   Location Phone: [phone]          Chief Complaint  · pt here for urologic issues      History Of Present Illness     59-year-old  female w h/o IC status post cystectomy many years ago with neobladder creation that was converted to ieal conduit.  Patient with right ureteral anastomotic stricture    Patient comes in today after having had right nephroureteral catheter placement -distal end in her conduit    10/5/2020 right nephroureteral catheter placement    Patient having quite a bit of pain around the catheter site. No f/c    He does have a red rubber catheter in her conduit as she does have possibility of stomal stenosis.  They do think even before her nephroureteral stent was placed she is making more urine with just the red rubber catheter in her conduit.    Previous    has bilateral 7 Maur[place] single J nephroureteral catheters.  The patient states that the last time they were changed was in April or May 2020.  She states that these were placed by her urologist in [place] related to concern for a fistula of some sort?.    some compliance issues in the past.    1/20 CT abdomen/pelvis with and without nonobstructing left renal calculi, 4 mm largest.  Severe right-sided hydronephrosis may relate to stricture at the anastomotic site.  2 urostomy tubes terminate in the neobladder rather than ureters.  No hydronephrosis on the left.  Couple punctate nonobstructing calculi within the neobladder.    12/19 urologist in [place] looposcopy, internalization of bilateral stents    11/19 creatinine 1.7,   GFR  31    Multiple  surgeries for parastomal hernia.  Patient is been managed with chronic indwelling catheter drainage from her loop.      2001 InterStim placement - not using.  2001 neobladder converted to ileal conduit  1998 cystectomy with neobladder -  for IC      h/o MI.  COPD    ASA 81    Previous    The patient also has a significant chronic health history including hypertension, recurrent urinary tract infections, chronic small bowel obstruction, anemia, multiple falls, and COPD.    history of severe malnutrition requiring long-term TPN previously.    Her son changes her urostomy bags.  The patient reports that she will need her nephroureteral catheters exchanged every 3 months.    history of urostomy stenosis.    She states she cannot tolerate any antibiotics aside from Levaquin and will not take anything aside from Levaquin for acute infections.    She currently uses [employer] products for her urostomy supplies as she states she can no longer use any other brand as they cause skin breakdown.     no family history of urologic cancers.     1-1/2 pack/day smoker x46 years    Patient has had several abdominal ventral hernia repairs.  She does have mesh.           Past Medical History  Disease Name Date Onset Notes   Allergies --  --    Anxiety --  --    Arthritis --  --    Bipolar 1 disorder --  --    Bipolar affective disorder --  --    Cataract --  --    Chronic pain --  --    COPD --  --    Depression --  --    Forgetfulness --  --    Gall Stones --  --    Heart Attack --  --    Heart Murmur --  --    Hemorrhoids --  --    Hernia --  --    High blood pressure --  --    High cholesterol --  --    Hydronephrosis --  --    Interstitial cystitis --  --    Kidney stone --  --    Migraine --  --    Psychiatric disorder --  --    Reflux --  --    Seizure --  --    Sinus trouble --  --          Past Surgical History  Procedure Name Date Notes   Caesarian section --  --    Gallbladder removal --  --    Hernia Repair --  --     Interstem Placement --  --    Nephrostomy With Tube Drainage --  --    Urostomy --  --          Medication List  Name Date Started Instructions   albuterol sulfate 90 mcg/actuation inhalation HFA aerosol inhaler 08/20/2020 inhale 2 puffs (180 mcg) by inhalation route every 4 hours for 30 days   aspirin 81 mg oral tablet,delayed release (DR/EC) 08/20/2020 take 1 tablet (81 mg) by oral route once daily for 30 days   buspirone 5 mg oral tablet 08/20/2020 take 1 tablet (5 mg) by oral route daily   cetirizine 10 mg oral tablet 08/20/2020 take 1 tablet by oral route once a day (at bedtime) for 30 days   clonazepam 1 mg oral tablet 08/20/2020 take 1 tablet by oral route three times a day as needed   Combivent Respimat  mcg/actuation inhalation mist 08/21/2020 inhale 1 puff by inhalation route 4 times per day ; may take additional puffs as needed not to exceed 6 puffs in 24hrs for 30 days   duloxetine 60 mg oral capsule,delayed release(DR/EC) 08/20/2020 take 2 capsules (120 mg) by oral route once daily for 30 days   fenofibrate 160 mg oral tablet 08/20/2020 take 1 tablet (160 mg) by oral route once daily for 30 days   ferrous sulfate 325 mg (65 mg iron) oral tablet 09/15/2020 take 1 tablet (325 mg) by oral route once daily for 30 days   fluticasone propionate 50 mcg/actuation nasal spray,suspension 08/20/2020 inhale 1 puff by nasal route 2 times a day for 30 days   hydrocodone-acetaminophen 7.5-325 mg oral tablet  take 1 tablet by oral route every 6 hours as needed for pain   levofloxacin 250 mg oral tablet 09/21/2020 take 1 tablet (250 mg) by oral route once daily for 10 days leading up to procedure   nortriptyline 75 mg oral capsule 08/20/2020 take 2 capsules (150 mg) by oral route once daily for 30 days   omeprazole 20 mg oral capsule,delayed release(DR/EC) 08/20/2020 take 1 capsule (20 mg) by oral route once daily 30 minutes to 1 hour before a meal for 30 days   Vitamin D2 1,250 mcg (50,000 unit) oral capsule  09/15/2020 tab 1 po weekly x 12   wheelchair with leg rest 0 09/03/2020 dx: vertigo R42, unsteady gait R26.81, frequent falls R29.6         Allergy List  Allergen Name Date Reaction Notes   Aleve --  --  --    Biaxin --  --  --    Cipro --  --  --    clindamycin HCl --  --  --    Flagyl --  --  --    gemfibrozil --  --  --    Keflex --  --  --    Latuda --  --  --    morphine --  --  --    PENICILLINS --  --  --    pravastatin --  myalgia --    QUINOLONES --  --  --    SULFA (SULFONAMIDES) --  --  --    TETRACYCLINES --  --  --    Valium --  --  --    vancomycin --  --  --        Allergies Reconciled  Family Medical History  Disease Name Relative/Age Notes   Family history of lung cancer  --    Family history of stroke Father/   --    Family history of heart disease Father/  Mother/   --    Family history of diabetes mellitus  --    Family history of stomach cancer Father/   --          Social History  Finding Status Start/Stop Quantity Notes   Tobacco Current every day --/-- 1.5 ppd --          Review of Systems  · Constitutional  o Denies  o : chills  · Respiratory  o Denies  o : cough  · Gastrointestinal  o Denies  o : nausea      Physical Examination  · Constitutional  o Appearance  o : Well-appearing, well-developed, in no acute distress  · Respiratory  o Respiratory Effort  o : Unlabored breathing  · Cardiovascular  o Heart  o :   § Auscultation of Heart  § : Regular rate and rhythm, no murmurs  · Neurologic  o Mental Status Examination  o :   § Orientation  § : Grossly oriented to person, place and time, judgment and insight intact, normal mood and affect         Nephroureteral stent covered and dressing looks okay, capped.               Assessment  · Nephrostomy complication     997.5/N99.528  · Presence of urostomy     V44.6/Z93.6  · Hydronephrosis     591/N13.30      Plan  · Orders  o EKG (Recording only) [facility] ([id number]) - V44.6/Z93.6, 997.5/N99.528, 591/N13.30 - 10/09/2020  · Medications  o Medications have  been Reconciled  o Transition of Care or Provider Policy  · Instructions  o Electronically Identified Patient Education Materials Provided Electronically       I did recommend patient see tertiary center, Dr. Le to discuss possibility of conduit revision or balloon dilation of her anastomotic stricture.  Patient is having a lot of pain from her nephroureteral stent and she wants to get scheduled for internalization of the stent.  I did discuss a stent needs to be changed out every 3 to 4 months forever    Did recommend she see a tertiary center before we tell her there is nothing else that can be done other than stent exchanges for the rest of her life    At this time we will leave in a red rubber catheter as she also may have some stricture of her conduit.  I do not feel her conduit drains very well.      I will get her referral to tertiary center hopefully before her surgery so if they decide to do something they can still have the PERC nephrostomy access    schedule for ileoscopy with internalization of right ureteral stent.  Risks and benefits were discussed including bleeding, infection and damage to the urinary system.  We also discussed the risk of anesthesia up to and including death.  Patient voiced understanding and would like to proceed.     Patient will start levofloxacin 500 mg 3 days before procedure    Greater than 20 minutes was used in counseling and coordination of care, with greater than 51% of this in face-to-face counseling             Electronically Signed by: Ridge Frye MD -Author on October 9, 2020 11:01:53 AM

## 2021-05-13 NOTE — PROGRESS NOTES
Progress Note      Patient Name: Breanna Smith   Patient ID: [id number]   Sex: Female   YOB: 1960    Primary Care Provider: Ligia KUNZ   Referring Provider: Ligia KUNZ    Visit Date: October 13, 2020    Provider: Will Jaeger MD   Location: [facility]   Location Address: [address] [place] [address], [address] [place] [address]  [id number]   Location Phone: [phone]          Chief Complaint  · f/u  · pt feels that she needs a psych referral for her Bipolar II. Depression has increased  · patient went without her Klonopin for several days. Neurologist was able to give her #63 on 10/12/20 but patient wants to insure that she will be able to continue this medication. She feels that she needs this.  · recent nephrectomy 10/5/20 on right kidney. Dr. Frye is following her and plans to do a permanent tube placement.      History Of Present Illness  Breanna Smith is a 59 year old /White female who presents for evaluation and treatment of:      59 years old female with past medical history of right hydronephrosis/bilateral ureter stents/right ureter catheter and bipolar disorder comes to the clinic today to establish care with a new provider and asking for psych referral for management and treatment of bipolar disorder.    Patient reports for her bipolar disorder she used to see psych before she moved here.  Currently taking Klonopin and multiple another medication.    Patient has a right hydronephrosis, following up with nephrologist possible ileoscopy in November, right ureteral catheter in place.    Patient currently reports that she has a limited physical function, smokes every day, reports he feels little bit drowsy likely due to multiple medications he is taking.       Past Medical History  Disease Name Date Onset Notes   Allergies --  --    Anxiety --  --    Arthritis --  --    Bipolar 1 disorder --  --    Bipolar affective disorder --  --     Cataract --  --    Chronic pain --  --    COPD --  --    Depression --  --    Forgetfulness --  --    Gall Stones --  --    Heart Attack --  --    Heart Murmur --  --    Hemorrhoids --  --    Hernia --  --    High blood pressure --  --    High cholesterol --  --    Hydronephrosis --  --    Interstitial cystitis --  --    Kidney stone --  --    Migraine --  --    Psychiatric disorder --  --    Reflux --  --    Seizure --  --    Sinus trouble --  --          Past Surgical History  Procedure Name Date Notes   Caesarian section --  --    Gallbladder removal --  --    Hernia Repair --  --    Interstem Placement --  --    Nephrostomy With Tube Drainage --  --    Urostomy --  --          Medication List  Name Date Started Instructions   albuterol sulfate 90 mcg/actuation inhalation HFA aerosol inhaler 08/20/2020 inhale 2 puffs (180 mcg) by inhalation route every 4 hours for 30 days   aspirin 81 mg oral tablet,delayed release (DR/EC) 08/20/2020 take 1 tablet (81 mg) by oral route once daily for 30 days   buspirone 5 mg oral tablet 08/20/2020 take 1 tablet (5 mg) by oral route daily   cetirizine 10 mg oral tablet 08/20/2020 take 1 tablet by oral route once a day (at bedtime) for 30 days   clonazepam 1 mg oral tablet 10/09/2020 take 1 tablet by oral route three times a day as needed   Combivent Respimat  mcg/actuation inhalation mist 08/21/2020 inhale 1 puff by inhalation route 4 times per day ; may take additional puffs as needed not to exceed 6 puffs in 24hrs for 30 days   duloxetine 60 mg oral capsule,delayed release(DR/EC) 08/20/2020 take 2 capsules (120 mg) by oral route once daily for 30 days   fenofibrate 160 mg oral tablet 08/20/2020 take 1 tablet (160 mg) by oral route once daily for 30 days   ferrous sulfate 325 mg (65 mg iron) oral tablet 09/15/2020 take 1 tablet (325 mg) by oral route once daily for 30 days   fluticasone propionate 50 mcg/actuation nasal spray,suspension 08/20/2020 inhale 1 puff by nasal  route 2 times a day for 30 days   hydrocodone-acetaminophen 7.5-325 mg oral tablet  take 1 tablet by oral route every 6 hours as needed for pain   levofloxacin 250 mg oral tablet 09/21/2020 take 1 tablet (250 mg) by oral route once daily for 10 days leading up to procedure   nortriptyline 75 mg oral capsule 08/20/2020 take 2 capsules (150 mg) by oral route once daily for 30 days   omeprazole 20 mg oral capsule,delayed release(DR/EC) 08/20/2020 take 1 capsule (20 mg) by oral route once daily 30 minutes to 1 hour before a meal for 30 days   Vitamin D2 1,250 mcg (50,000 unit) oral capsule 09/15/2020 tab 1 po weekly x 12   wheelchair with leg rest 0 09/03/2020 dx: vertigo R42, unsteady gait R26.81, frequent falls R29.6         Allergy List  Allergen Name Date Reaction Notes   Aleve --  --  --    Biaxin --  --  --    Cipro --  --  --    clindamycin HCl --  --  --    Flagyl --  --  --    gemfibrozil --  --  --    Keflex --  --  --    Latuda --  --  --    morphine --  --  --    PENICILLINS --  --  --    pravastatin --  myalgia --    QUINOLONES --  --  --    SULFA (SULFONAMIDES) --  --  --    TETRACYCLINES --  --  --    Valium --  --  --    vancomycin --  --  --        Allergies Reconciled  Family Medical History  Disease Name Relative/Age Notes   Family history of lung cancer  --    Family history of stroke Father/   --    Family history of heart disease Father/  Mother/   --    Family history of diabetes mellitus  --    Family history of stomach cancer Father/   --          Social History  Finding Status Start/Stop Quantity Notes   Tobacco Current every day --/-- 1.5 ppd --          Review of Systems  · Constitutional  o Denies  o : fatigue, night sweats  · Eyes  o Denies  o : double vision, blurred vision  · HENT  o Denies  o : vertigo, recent head injury  · Breasts  o Denies  o : abnormal changes in breast size, additional breast symptoms except as noted in the HPI  · Cardiovascular  o Denies  o : chest pain, irregular  "heart beats  · Respiratory  o Denies  o : shortness of breath, productive cough  · Gastrointestinal  o Denies  o : nausea, vomiting  · Genitourinary  o Denies  o : dysuria, urinary retention  · Integument  o Denies  o : hair growth change, new skin lesions  · Neurologic  o Denies  o : altered mental status, seizures  · Musculoskeletal  o Denies  o : joint swelling, limitation of motion  · Endocrine  o Denies  o : cold intolerance, heat intolerance  · Heme-Lymph  o Denies  o : petechiae, lymph node enlargement or tenderness  · Allergic-Immunologic  o Denies  o : frequent illnesses      Vitals  Date Time BP Position Site L\R Cuff Size HR RR TEMP (F) WT  HT  BMI kg/m2 BSA m2 O2 Sat FR L/min FiO2        10/13/2020 09:57 AM 87/49 Sitting    83 - R  95.7 106lbs 0oz 5'  4\" 18.19 1.47 97 %            Physical Examination  · Constitutional  o Appearance  o : well-nourished, well developed, alert, in no acute distress  · Eyes  o Conjunctivae  o : conjunctivae normal  o Sclerae  o : sclerae white  o Pupils and Irises  o : pupils equal, round, and reactive to light and accommodation bilaterally  o Corneas  o : tear film normal, no lesions present  o Eyelids/Ocular Adnexae  o : eyelid appearance normal, no exudates present, eye moisture level normal  · Ears, Nose, Mouth and Throat  o Ears  o : external ear auricle normal, otic canal normal, TM with no reddness, effusion, retraction  o Nose  o : external normal, nasal mucosa normal, turbinates normal  o Oral Cavity  o : tongue no lesion, oral mucosa normal  o Throat  o : no erythemia, exudate or lesions  · Neck  o Inspection/Palpation  o : normal appearance, no masses or tenderness, trachea midline, no enlarged cervical or supraclavicular lymphnodes palpated  o Thyroid  o : gland size normal, nontender, no nodules or masses present on palpation, thyroid motion normal during swallowing  · Respiratory  o Respiratory Effort  o : breathing unlabored  o Inspection of Chest  o : " normal appearance, no retractions  o Auscultation of Lungs  o : normal breath sounds throughout  · Cardiovascular  o Heart  o :   § Auscultation of Heart  § : regular rate and rhythm without murmur, PMI normal  o Peripheral Vascular System  o :   § Carotid Arteries  § : normal pulses bilaterally, no bruits present  § Pedal Pulses  § : pulses 2 bilaterally  § Extremities  § : no cyanosis, clubbing or edema; less than 2 second refill noted  · Skin and Subcutaneous Tissue  o General Inspection  o : Right back ureter catheter seen, nontender, dressing was changed changed today  · Neurologic  o Mental Status Examination  o : judgement, insight intact, modd and affect appropriate  o Motor Examination  o : strength grossly intact in all four extremities  o Gait and Station  o : normal gait, able to stand without difficulty          Assessment  · Depression     311/F32.9  --Controlled, continue current management  · Nicotine dependence     305.1/F17.200  Counseling provided  · Tobacco abuse counseling       Tobacco abuse counseling     V65.42/Z71.6  Counseling provided  · Bipolar 2 disorder     296.89/F31.81  --Continue current management  --Will consult psych  · Hydronephrosis     591/N13.30  --Continue management as per nephrologist, possible ileoscopy in November as per nephrologist  --Nephrology notes reviewed      Plan  · Orders  o PSYCHOLOGY CONSULTATION (PSYCO) - 311/F32.9, 296.89/F31.81 - 10/13/2020   please eval and treat for mood disorder/Bipolar/depression  o ACO-17: Screened for tobacco use AND received tobacco cessation intervention (4004F) - 305.1/F17.200 - 10/13/2020  o ACO-17: Screened for tobacco use AND received tobacco cessation intervention (4004F) - V65.42/Z71.6 - 10/13/2020  o ACO-17: Screened for tobacco use AND received tobacco cessation intervention (4004F) - - 10/13/2020  o ACO-39: Current medications updated and reviewed (, 1159F) - - 10/13/2020  · Medications  o Medications have been  "Reconciled  o Transition of Care or Provider Policy  · Instructions  o Discussed the need for therapy, either with a certified counselor, psychologist, and/or family . If no improvement is noted or worsening of their condition, return to office or ER. But also discussed with patient that if they are non-responsive to the type of medication they may need to see a psychiatrist for further evaluation and management.  o Patient was given an SSRI/SSNRI medication and warned of possible side effects of the medication including potential for increased risk of suicidal thoughts and feelings. Patient was instructed that if they begin to exhibit any of these effects they will discontinue the medication immediately and contact our office or the ER ASAP.  o Patient agrees to a \"No Self Harm\" contract. Patient will either call , [facility], ER, [facility], [facility].  o *Form of nicotine being used:   o Patient was strongly encouraged to discontinue use of any nicotine containing product or minimize the use of the product.  o Discussed smoking cessation and counseling with patient for over 3 minutes.  o Tobacco and smoking cessation counseling for more than 3 minutes was completed.  o Patient was educated/instructed on their diagnosis, treatment and medications prior to discharge from the clinic today.  o Patient counseled to stop smoking.  o Patient counseled to reduce calorie intake.  o Patient was instructed to exercise regularly.  o Patient instructed to seek medical attention urgently for new or worsening symptoms.  o Call the office with any concerns or questions.  o Bring all medicines with their bottles to each office visit.  o Minutes spent with patient including greater than 50% in Education/Counseling/Care Coordination.  o Time spent with the patient was minutes, more than 50% face to face.  · Disposition  o Follow Up in 1 month.            Electronically Signed by: Will Jaeger MD " -Author on October 13, 2020 02:48:53 PM

## 2021-05-13 NOTE — PROGRESS NOTES
Progress Note      Patient Name: Breanna Smith   Patient ID: [id number]   Sex: Female   YOB: 1960    Primary Care Provider: Ligia KUNZ   Referring Provider: Will Jaeger MD    Visit Date: December 2, 2020    Provider: Will Jaeger MD   Location: [facility]   Location Address: [address] [place] [address], [address] [place] [address]  [id number]   Location Phone: [phone]          Chief Complaint  · 6-8 week f/u depression, tobacco use, weight  · pt states that she pushed a grocery cart yesterday and her LLQ hernia is hurting  · declines Pap  · declines colonoscopy      History Of Present Illness  Breanna Smith is a 60 year old /White female who presents for evaluation and treatment of:      60 years old female with past medical history of multiple abdominal surgeries, hydronephrosis, hyperlipidemia, iron deficiency anemia, chronic pain, GERD, depression/anxiety/bipolar disorder, COPD/asthma, and urostomy comes to the clinic today to follow-up on chronic conditions.    Patient recently had ileoscopy/right ureteral stent placed.  Patient was seen by urology and urooncology recently.  Not a good candidate for any abdominal surgery due to her history.    Continue anxiety/depression/bipolar management as per psych.    Patient currently reports mild to moderate abdominal pain at the urostomy bag.  Patient reports that start something new for her.  Patient is in chronic pain management and taking hydrocodone.  Patient also has multiple ventral hernias.  But not a good surgical candidate.  Surgical options discussed with patient but reports she does not want to do any surgery.  Declined colonoscopy declined Pap smear.       Past Medical History  Disease Name Date Onset Notes   Allergies --  --    Anxiety --  --    Arthritis --  --    Bipolar 1 disorder --  --    Bipolar affective disorder --  --    Cataract --  --    Chronic pain --  --    COPD --  --     Depression --  --    Forgetfulness --  --    Gall Stones --  --    Heart Attack --  --    Heart Murmur --  --    Hemorrhoids --  --    Hernia --  --    High blood pressure --  --    High cholesterol --  --    Hydronephrosis --  --    Interstitial cystitis --  --    Kidney stone --  --    Migraine --  --    Psychiatric disorder --  --    Reflux --  --    Seizure --  --    Sinus trouble --  --          Past Surgical History  Procedure Name Date Notes   Caesarian section --  --    Gallbladder removal --  --    Hernia Repair --  --    Interstem Placement --  --    Nephrostomy With Tube Drainage --  --    Urostomy --  --          Medication List  Name Date Started Instructions   aspirin 81 mg oral tablet,delayed release (DR/EC) 08/20/2020 take 1 tablet (81 mg) by oral route once daily for 30 days   cetirizine 10 mg oral tablet 08/20/2020 take 1 tablet by oral route once a day (at bedtime) for 30 days   clonazepam 1 mg oral tablet 11/02/2020 take 1 tablet by oral route three times a day as needed   Combivent Respimat  mcg/actuation inhalation mist 08/21/2020 inhale 1 puff by inhalation route 4 times per day ; may take additional puffs as needed not to exceed 6 puffs in 24hrs for 30 days   Daily Multi-Vitamin oral tablet  take 1 tablet by oral route daily   duloxetine 60 mg oral capsule,delayed release(DR/EC) 08/20/2020 take 2 capsules (120 mg) by oral route once daily for 30 days   fenofibrate 160 mg oral tablet 08/20/2020 take 1 tablet (160 mg) by oral route once daily for 30 days   ferrous sulfate 325 mg (65 mg iron) oral tablet 09/15/2020 take 1 tablet (325 mg) by oral route once daily for 30 days   fluticasone propionate 50 mcg/actuation nasal spray,suspension 08/20/2020 inhale 1 puff by nasal route 2 times a day for 30 days   hydrocodone-acetaminophen 7.5-325 mg oral tablet  take 1 tablet by oral route every 8 hours as needed   nortriptyline 75 mg oral capsule 08/20/2020 take 2 capsules (150 mg) by oral route  once daily for 30 days   omeprazole 20 mg oral capsule,delayed release(DR/EC) 08/20/2020 take 1 capsule (20 mg) by oral route once daily 30 minutes to 1 hour before a meal for 30 days   Vitamin C 500 mg oral capsule, extended release  take 1 capsule by oral route daily   Vitamin D2 1,250 mcg (50,000 unit) oral capsule 09/15/2020 tab 1 po weekly x 12   wheelchair with leg rest 0 09/03/2020 dx: vertigo R42, unsteady gait R26.81, frequent falls R29.6         Allergy List  Allergen Name Date Reaction Notes   Aleve --  --  --    Biaxin --  --  --    Cipro --  --  --    clindamycin HCl --  --  --    Flagyl --  --  --    gemfibrozil --  --  --    Keflex --  --  --    Latuda --  --  --    PENICILLINS --  --  --    pravastatin --  myalgia --    QUINOLONES --  --  --    SULFA (SULFONAMIDES) --  --  --    TETRACYCLINES --  --  --    vancomycin --  --  --          Family Medical History  Disease Name Relative/Age Notes   Family history of lung cancer  --    Family history of stroke Father/   --    Family history of heart disease Father/  Mother/   --    Family history of diabetes mellitus  --    Family history of stomach cancer Father/   --          Social History  Finding Status Start/Stop Quantity Notes   Tobacco Current every day --/-- 1.5 ppd --          Review of Systems  · Constitutional  o Denies  o : fatigue, night sweats  · Eyes  o Denies  o : double vision, blurred vision  · HENT  o Denies  o : vertigo, recent head injury  · Cardiovascular  o Denies  o : chest pain, irregular heart beats  · Respiratory  o Denies  o : shortness of breath, productive cough  · Gastrointestinal  o Admits  o : Mild abdominal pain due to ventral hernias and at the urostomy bag  o Denies  o : nausea, vomiting  · Genitourinary  o Denies  o : dysuria, urinary retention  · Integument  o Denies  o : hair growth change, new skin lesions  · Neurologic  o Denies  o : altered mental status, seizures  · Musculoskeletal  o Denies  o : joint swelling,  "limitation of motion  · Endocrine  o Denies  o : cold intolerance, heat intolerance  · Heme-Lymph  o Denies  o : petechiae, lymph node enlargement or tenderness  · Allergic-Immunologic  o Denies  o : frequent illnesses      Vitals  Date Time BP Position Site L\R Cuff Size HR RR TEMP (F) WT  HT  BMI kg/m2 BSA m2 O2 Sat FR L/min FiO2 HC       12/02/2020 10:00 AM 93/57 Sitting    93 - R  96 115lbs 16oz 5'  4\" 19.91 1.54 95 %            Physical Examination  · Constitutional  o Appearance  o : well-nourished, well developed, alert, in no acute distress  · Eyes  o Conjunctivae  o : conjunctivae normal  o Sclerae  o : sclerae white  o Pupils and Irises  o : pupils equal, round, and reactive to light and accommodation bilaterally  o Corneas  o : tear film normal, no lesions present  o Eyelids/Ocular Adnexae  o : eyelid appearance normal, no exudates present, eye moisture level normal  · Neck  o Inspection/Palpation  o : normal appearance, no masses or tenderness, trachea midline, no enlarged cervical or supraclavicular lymphnodes palpated  o Thyroid  o : gland size normal, nontender, no nodules or masses present on palpation, thyroid motion normal during swallowing  · Respiratory  o Respiratory Effort  o : breathing unlabored  o Inspection of Chest  o : normal appearance, no retractions  o Auscultation of Lungs  o : normal breath sounds throughout  · Cardiovascular  o Heart  o :   § Auscultation of Heart  § : regular rate and rhythm without murmur, PMI normal  o Peripheral Vascular System  o :   § Carotid Arteries  § : normal pulses bilaterally, no bruits present  § Pedal Pulses  § : pulses 2 bilaterally  § Extremities  § : no cyanosis, clubbing or edema; less than 2 second refill noted  · Gastrointestinal  o Abdominal Examination  o : abdomen nontender to palpation, tone normal without rigidity or guarding, patient has urostomy bag on the left lower abdomen mild tenderness around stomal stenosis and urostomy bag.  o Liver " and spleen  o : no hepatomegaly present, liver nontender to palpation, spleen not palpable  o Hernias  o : Multiple abdominal hernias  · Musculoskeletal  o General  o : No joint swelling or deformity noted. Muscle tone, strength and development grossly normal.  · Skin and Subcutaneous Tissue  o General Inspection  o : no rashes or lesions present, no areas of discoloration  · Neurologic  o Mental Status Examination  o : judgement, insight intact, modd and affect appropriate  o Motor Examination  o : strength grossly intact in all four extremities  o Gait and Station  o : normal gait, able to stand without difficulty              Assessment  · Screening for HIV (human immunodeficiency virus)     V73.89/Z11.4  · Anemia     285.9/D64.9  · Anxiety disorder     300.00/F41.9  · Depression     311/F32.9  · Fatigue     780.79/R53.83  · DANGELO (iron deficiency anemia)     280.9/D50.9  · Screening for breast cancer     V76.10/Z12.39  · Hydronephrosis     591/N13.30  · Ileal conduit stomal stenosis     997.5/T85.858A  · S/P ureteral stent placement     V45.89/Z96.0       Strict ER precautions discussed due to multiple ventral hernias.  We will consider surgical referral in the future; currently patient declined any intention of having surgical interventions  Continue with pain management/urology nd psych       Plan  · Orders  o HIV Screen by EIA [facility] (57257, ) - V73.89/Z11.4 - 12/02/2020  o Iron panel (iron, TIBC, transferrin saturation) (19563, 27087, 14081) - 780.79/R53.83 - 12/02/2020  o B12 Folate levels (B12FO) - 780.79/R53.83 - 12/02/2020  o Physical, Primary Care Panel (CBC, CMP, Lipid, TSH) [facility] (71670, 71323, 94715, 24591) - 280.9/D50.9, 591/N13.30, 780.79/R53.83 - 12/02/2020  o ACO-39: Current medications updated and reviewed (1159F, ) - - 12/02/2020  o Ferritin (07816) - 280.9/D50.9 - 12/02/2020  o Mammogram breast screening 3D digital bilateral (51108, , 39236) - V76.10/Z12.39 -  "12/02/2020  · Medications  o Medications have been Reconciled  o Transition of Care or Provider Policy  · Instructions  o CDC recommends that everyone between 13 and 64 get tested for HIV at least once as part of routine healthcare.  o Discussed the need for therapy, either with a certified counselor, psychologist, and/or family . If no improvement is noted or worsening of their condition, return to office or ER. But also discussed with patient that if they are non-responsive to the type of medication they may need to see a psychiatrist for further evaluation and management.  o Patient was given an SSRI/SSNRI medication and warned of possible side effects of the medication including potential for increased risk of suicidal thoughts and feelings. Patient was instructed that if they begin to exhibit any of these effects they will discontinue the medication immediately and contact our office or the ER ASAP.  o Patient agrees to a \"No Self Harm\" contract. Patient will either call , [facility], , [facility], [facility].  o Discussed the need for therapy, either with a certified counselor, psychologist, and/or family . If no improvement is noted or worsening of their condition, return to office or ER. But also discussed with patient that if they are non-responsive to the type of medication they may need to see a psychiatrist for further evaluation and management.  o Patient was given an SSRI/SSNRI medication and warned of possible side effects of the medication including potential for increased risk of suicidal thoughts and feelings. Patient was instructed that if they begin to exhibit any of these effects they will discontinue the medication immediately and contact our office or the ER ASA.  o Patient agrees to a \"No Self Harm\" contract. Patient will either call us, 911, ER, [facility], [facility].  o Patient was educated/instructed on their " diagnosis, treatment and medications prior to discharge from the clinic today.  o Patient was instructed to exercise regularly.  o Patient instructed to seek medical attention urgently for new or worsening symptoms.  o Call the office with any concerns or questions.  o Bring all medicines with their bottles to each office visit.  o Minutes spent with patient including greater than 50% in Education/Counseling/Care Coordination.  o Time spent with the patient was minutes, more than 50% face to face.  o Discussed Covid-19 precautions including, but not limited to, social distancing, avoid touching your face, and hand washing.   · Disposition  o Call or Return if symptoms worsen or persist.  o Follow Up in 1 month.            Electronically Signed by: Will Jaeger MD -Author on December 3, 2020 05:09:37 PM

## 2021-05-13 NOTE — PROGRESS NOTES
Progress Note      Patient Name: Breanna Smith   Patient ID: [id number]   Sex: Female   YOB: 1960    Primary Care Provider: Ligia KUNZ   Referring Provider: Ligia KUNZ    Visit Date: September 24, 2020    Provider: Ridge Frye MD   Location: [facility]   Location Address: [address] [place] [address]  [id number]   Location Phone: [phone]          Chief Complaint  · pt is here for urological concerns      History Of Present Illness  TELEHEALTH TELEPHONE VISIT  Breanna Smith is a 59 year old /White female who is presenting for evaluation via telehealth telephone visit. Verbal consent obtained before beginning visit.   Provider spent 12 minutes with the patient during the telehealth visit.   The following staff were present during this visit: Deisi Weir   Past Medical History/ Overview of Patient Symptoms     59-year-old  female w h/o IC status post cystectomy many years ago with neobladder creation that was converted to ieal conduit.    Patient calls in today wanting to discuss whether or not she has to have coitus.  Patient is very adamant she does not want to do this.    Ureteral stents were removed at her last visit and a red rubber catheter was placed in her ileal conduit because these were not in her ureters    Previous    has bilateral 7 Polish single J nephroureteral catheters.  The patient states that the last time they were changed was in April or May 2020.  She states that these were placed by her urologist in [place] related to concern for a fistula of some sort?.    some compliance issues in the past.    1/20 CT abdomen/pelvis with and without nonobstructing left renal calculi, 4 mm largest.  Severe right-sided hydronephrosis may relate to stricture at the anastomotic site.  2 urostomy tubes terminate in the neobladder rather than ureters.  No hydronephrosis on the left.  Couple punctate  nonobstructing calculi within the neobladder.    12/19 urologist in [place] looposcopy, internalization of bilateral stents    11/19 creatinine 1.7,   GFR  31    Multiple surgeries for parastomal hernia.  Patient is been managed with chronic indwelling catheter drainage from her loop.      2001 InterStim placement - not using.  2001 neobladder converted to ileal conduit  1998 cystectomy with neobladder      h/o MI.  COPD    ASA 81    Previous    The patient also has a significant chronic health history including hypertension, recurrent urinary tract infections, chronic small bowel obstruction, anemia, multiple falls, and COPD.    history of severe malnutrition requiring long-term TPN previously.    Her son changes her urostomy bags.  The patient reports that she will need her nephroureteral catheters exchanged every 3 months.    history of urostomy stenosis.    She states she cannot tolerate any antibiotics aside from Levaquin and will not take anything aside from Levaquin for acute infections.    She currently uses [employer] for her urostomy supplies as she states she can no longer use any other brand as they cause skin breakdown.     no family history of urologic cancers.     1-1/2 pack/day smoker x46 years    Patient has had several abdominal ventral hernia repairs.  She does have mesh.           Past Medical History  Allergies; Anxiety; Arthritis; Bipolar 1 disorder; Bipolar affective disorder; Cataract; Chronic pain; COPD; Depression; Forgetfulness; Gall Stones; Heart Attack; Heart Murmur; Hemorrhoids; Hernia; High blood pressure; High cholesterol; Interstitial cystitis; Kidney stone; Migraine; Psychiatric disorder; Reflux; Seizure; Sinus trouble         Past Surgical History  Caesarian section; Gallbladder removal; Hernia Repair; Interstem Placement; Urostomy         Medication List  albuterol sulfate 90 mcg/actuation inhalation HFA aerosol inhaler; aspirin 81 mg oral tablet,delayed release (DR/EC);  buspirone 5 mg oral tablet; cetirizine 10 mg oral tablet; clonazepam 1 mg oral tablet; Combivent Respimat  mcg/actuation inhalation mist; duloxetine 60 mg oral capsule,delayed release(DR/EC); fenofibrate 160 mg oral tablet; ferrous sulfate 325 mg (65 mg iron) oral tablet; fluticasone propionate 50 mcg/actuation nasal spray,suspension; hydrocodone-acetaminophen 7.5-325 mg oral tablet; levofloxacin 250 mg oral tablet; nortriptyline 75 mg oral capsule; omeprazole 20 mg oral capsule,delayed release(DR/EC); Vitamin D2 1,250 mcg (50,000 unit) oral capsule; wheelchair with leg rest 0         Allergy List  Aleve; Biaxin; Cipro; clindamycin HCl; Flagyl; gemfibrozil; Keflex; Latuda; morphine; PENICILLINS; pravastatin; QUINOLONES; SULFA (SULFONAMIDES); TETRACYCLINES; Valium; vancomycin         Family Medical History  Family history of lung cancer; Family history of stroke; Family history of heart disease; Family history of diabetes mellitus; Family history of stomach cancer         Social History  Tobacco (Current every day)         Review of Systems  · Constitutional  o Denies  o : chills  · Respiratory  o Denies  o : cough  · Gastrointestinal  o Denies  o : nausea      Physical Examination  · Gastrointestinal  o Abdominal Examination  o : Nontender, nondistended, no rigidity or guarding, no hepatosplenomegaly                    Assessment  · Nephrostomy complication     997.5/N99.528  · Presence of urostomy     V44.6/Z93.6  · Hydronephrosis     591/N13.30    Problems Reconciled  Plan  · Orders  o Physician Telephone Evaluation, 11-20 minutes (91800) - V44.6/Z93.6, 997.5/N99.528, 591/N13.30 - 09/24/2020  · Medications  o Medications have been Reconciled  o Transition of Care or Provider Policy  · Instructions  o Plan Of Care:   o Electronically Identified Patient Education Materials Provided Electronically       After discussion of risk and benefits patient will go ahead with her COVID test and procedure.  I did discuss  failure to do this procedure could cause her to slowly lose function of her kidney.  We will plan on internalization of her stent if he wants me to in a few weeks.    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  We did discuss she is high risk for any surgery.             Electronically Signed by: Ridge Frye MD -Author on September 24, 2020 12:38:24 PM

## 2021-05-13 NOTE — PROGRESS NOTES
"   Progress Note      Patient Name: Breanna Smith   Patient ID: [id number]   Sex: Female   YOB: 1960    Primary Care Provider: Ligia KUNZ   Referring Provider: Will Jaeger MD    Visit Date: December 7, 2020    Provider: Vincenzo Shay MD   Location: [facility]   Location Address: [address] [place] [address]  [id number]   Location Phone: [phone]          Chief Complaint  · Depressed  · Anxious     Patient is here for a follow up 2 wks    \"Ok.\"           History Of Present Illness  Breanna Smith is a 60 year old /White female who presents to the office today referred by Will Jaeger MD.   Chart: 59F, hx of right hydronephrosis/bilateral ureter stents/right ureter catheter and bipolar disorder. On clonazepam 1 mg tid, duloxetine 120 mg daily, nortriptyline 150 daily. 9/2020: Cr 1.64, Hct 27.6 low, K 3.4 low. No EKG, head imaging. MARLENI-7 score November 2 is 19, severe. PHQ 9 score is 24, severe.   Patient showed up in person briefly about 5 minutes late for her interview. We had left her several messages that we are no longer doing in person visits, but apparently she did not receive them. She utilizes her nephew's phone, and since she did not receive several messages we left, it is likely they were deleted. Very brief interview with the patient today. Follow-up set up for 1 week. No acute safety concerns. Patient wanted refills on some of her medications. No SI HI AVH.   ...   ...   ...       Past Medical History  Disease Name Date Onset Notes   Allergies --  --    Anxiety --  --    Arthritis --  --    Bipolar 1 disorder --  --    Bipolar affective disorder --  --    Cataract --  --    Chronic pain --  --    COPD --  --    Depression --  --    Forgetfulness --  --    Gall Stones --  --    Heart Attack --  --    Heart Murmur --  --    Hemorrhoids --  --    Hernia --  --    High blood pressure --  --    High cholesterol --  --    Hydronephrosis " --  --    Interstitial cystitis --  --    Kidney stone --  --    Migraine --  --    Psychiatric disorder --  --    Reflux --  --    Seizure --  --    Sinus trouble --  --          Past Surgical History  Procedure Name Date Notes   Caesarian section --  --    Gallbladder removal --  --    Hernia Repair --  --    Interstem Placement --  --    Nephrostomy With Tube Drainage --  --    Urostomy --  --          Medication List  Name Date Started Instructions   aspirin 81 mg oral tablet,delayed release (DR/EC) 08/20/2020 take 1 tablet (81 mg) by oral route once daily for 30 days   cetirizine 10 mg oral tablet 08/20/2020 take 1 tablet by oral route once a day (at bedtime) for 30 days   clonazepam 1 mg oral tablet 11/02/2020 take 1 tablet by oral route three times a day as needed   Combivent Respimat  mcg/actuation inhalation mist 08/21/2020 inhale 1 puff by inhalation route 4 times per day ; may take additional puffs as needed not to exceed 6 puffs in 24hrs for 30 days   Daily Multi-Vitamin oral tablet  take 1 tablet by oral route daily   duloxetine 60 mg oral capsule,delayed release(DR/EC) 08/20/2020 take 2 capsules (120 mg) by oral route once daily for 30 days   fenofibrate 160 mg oral tablet 08/20/2020 take 1 tablet (160 mg) by oral route once daily for 30 days   ferrous sulfate 325 mg (65 mg iron) oral tablet 09/15/2020 take 1 tablet (325 mg) by oral route once daily for 30 days   fluticasone propionate 50 mcg/actuation nasal spray,suspension 08/20/2020 inhale 1 puff by nasal route 2 times a day for 30 days   hydrocodone-acetaminophen 7.5-325 mg oral tablet  take 1 tablet by oral route every 8 hours as needed   nortriptyline 75 mg oral capsule 08/20/2020 take 2 capsules (150 mg) by oral route once daily for 30 days   omeprazole 20 mg oral capsule,delayed release(DR/EC) 08/20/2020 take 1 capsule (20 mg) by oral route once daily 30 minutes to 1 hour before a meal for 30 days   Vitamin C 500 mg oral capsule, extended  release  take 1 capsule by oral route daily   Vitamin D2 1,250 mcg (50,000 unit) oral capsule 09/15/2020 tab 1 po weekly x 12   wheelchair with leg rest 0 09/03/2020 dx: vertigo R42, unsteady gait R26.81, frequent falls R29.6         Allergy List  Allergen Name Date Reaction Notes   Aleve --  --  --    Biaxin --  --  --    Cipro --  --  --    clindamycin HCl --  --  --    Flagyl --  --  --    gemfibrozil --  --  --    Keflex --  --  --    Latuda --  --  --    PENICILLINS --  --  --    pravastatin --  myalgia --    QUINOLONES --  --  --    SULFA (SULFONAMIDES) --  --  --    TETRACYCLINES --  --  --    vancomycin --  --  --        Allergies Reconciled  Family Medical History  Disease Name Relative/Age Notes   Family history of lung cancer  --    Family history of stroke Father/   --    Family history of heart disease Father/  Mother/   --    Family history of diabetes mellitus  --    Family history of stomach cancer Father/   --          Social History  Finding Status Start/Stop Quantity Notes   Tobacco Current every day --/-- 1.5 ppd --          Review of Systems  · Constitutional  o Denies  o : fatigue, night sweats  · Eyes  o Admits  o : blurred vision  o Denies  o : double vision  · HENT  o Admits  o : vertigo  o Denies  o : recent head injury  · Breasts  o Denies  o : abnormal changes in breast size, additional breast symptoms except as noted in the HPI  · Cardiovascular  o Admits  o : chest pain, irregular heart beats  · Respiratory  o Admits  o : shortness of breath, productive cough  · Gastrointestinal  o Admits  o : nausea, vomiting  · Genitourinary  o Denies  o : dysuria, urinary retention  · Integument  o Admits  o : hair growth change  o Denies  o : new skin lesions  · Neurologic  o Denies  o : altered mental status, seizures  · Musculoskeletal  o Admits  o : joint swelling, limitation of motion  · Endocrine  o Admits  o : cold intolerance  o Denies  o : heat intolerance  · Psychiatric  o Admits  o :  "anxiety, depression  · Heme-Lymph  o Denies  o : petechiae, lymph node enlargement or tenderness  · Allergic-Immunologic  o Denies  o : frequent illnesses      Physical Examination  · Mental Status Exam  o Appearance  o : good eye contact, normal street clothes, groomed, wearing a mask  o Behavior  o : pleasant and cooperative  o Motor  o : Mild psychomotor retardation  o Speech  o : Some latency, otherwise normal rhythm, rate, volume, tone, not hyperverbal, not pressured, normal prosidy  o Mood  o : \"Ok\"  o Affect  o : constricted  o Thought Content  o : negative suicidal ideations, negative homicidal ideations, negative obsessions  o Perceptions  o : negative auditory hallucinations, negative visual hallucinations  o Thought Process  o : linear for brief interview  o Insight/Judgement  o : fair/fair  o Cognition  o : grossly intact today  o Attention  o : intact          Assessment  · Tobacco abuse counseling       Tobacco abuse counseling     V65.42/Z71.6  · Anxiety     300.02/F41.1  · Depression     296.31       Presentation most consistent with anxiety disorder unspecified, depressive disorder unspecified.  Patient is not able to provide a compelling history for bipolar disorder, either 1 or 2.    Stable. No medication changes today. See back next week as some confusion regarding discontinuation of in-person services.    Patient does not present with any acute safety concerns.  Denies SI, plan, or intent.    Continue medications as prescribed.    Return in 1 weeks. F/U on referral for psychotherapy and case management.       Plan  · Orders  o JACKIE Report (KAS[facility]) - 300.02/F41.1, 296.31, V65.42/Z71.6 - 12/07/2020  o ACO-39: Current medications updated and reviewed (1159F, ) - - 12/07/2020  · Medications  o duloxetine 60 mg oral capsule,delayed release(DR/EC)   SIG: take 2 capsules (120 mg) by oral route once daily for 90 days   DISP: (180) Capsule with 3 refills  Refilled on 12/07/2020     o nortriptyline " "75 mg oral capsule   SIG: take 2 capsules (150 mg) by oral route once daily for 90 days   DISP: (180) Capsule with 3 refills  Refilled on 12/07/2020     o Medications have been Reconciled  o Transition of Care or Provider Policy  · Instructions  o Consults: consider neurology for further management of patient's \"memory issues.\"  o Risk Assessment: Risk of self-harm acutely is moderate to high. Risk factors include previous suicide attempt, recent SI, possible access to weapons, some AODA, depressive disorder, anxiety disorder. Protective factors include no present SI, no family history of suicide attempts, healthcare seeking, future orientation in that she is preparing for surgery in 2 days, willingness to engage in care. Risk of self-harm chronically is also moderate to high, but could be further elevated in the event of treatment noncompliance and/or AODA.  o Safety: No acute safety concerns.  o Medications: Continue duloxetine 120 mg p.o. daily, nortriptyline 150 mg p.o. daily, Klonopin 1 mg p.o. 3 times daily. Patient signed controlled substances education agreement today. Nitin report ordered, it is pending due to manual preparation. Consider switching the patient from duloxetine to a different medication. Patient reports she has been on nortriptyline for 30 years. It will be very difficult to titrate the patient down on Klonopin as she is also been on this chronically.  o Therapy: 25 minutes of therapy. Referral made for both psychotherapy and possible case management, which I understand [facility] can do.  o Labs/Studies: UDS ordered at last visit.  o Follow Up: 1 weeks.  · Disposition  o Follow Up in 1 week.            Electronically Signed by: Vincenzo Shay MD -Author on December 7, 2020 12:07:19 PM  "

## 2021-05-13 NOTE — PROGRESS NOTES
Progress Note      Patient Name: Breanna Smith   Patient ID: [id number]   Sex: Female   YOB: 1960    Primary Care Provider: Ligia KUNZ   Referring Provider: Ligia KUNZ    Visit Date: October 30, 2020    Provider: Ridge Frye MD   Location: [facility]   Location Address: [address] [place] [address]  [id number]   Location Phone: [phone]          Chief Complaint  · pt here for urologic issues      History Of Present Illness     59-year-old  female w h/o IC status post cystectomy many years ago with neobladder creation that was converted to ieal conduit.  Patient with right ureteral anastomotic stricture    pt had neph-U stent replaced 2 days ago.  Came out.   Having a lot of pain in her side.  Had this ever since this was placed.  Tube was leaking bad but since replacement has stopped.    Patient also describes chronic pain over the interstim site was there before neph tube placement.      Talk with urologic oncology yesterday, Dr. Le and he discussed she was high risk for any further abdominal surgeries.      Patient is scheduled for internalization of her nephroureteral catheter this coming week.  She is going to start levofloxacin 3 days before.    Patient having quite a bit of pain around the catheter site. No f/c    He does have a red rubber catheter in her conduit as she does have possibility of stomal stenosis.  They do think even before her nephroureteral stent was placed she is making more urine with just the red rubber catheter in her conduit.    h/o MI.  COPD    ASA 81    Previous    10/5/2020 right nephroureteral catheter placement    has bilateral 7 [place] single J nephroureteral catheters.  The patient states that the last time they were changed was in April or May 2020.  She states that these were placed by her urologist in [place] related to concern for a fistula of some sort?.    compliance issues in the  past.    1/20 CT abdomen/pelvis with and without nonobstructing left renal calculi, 4 mm largest.  Severe right-sided hydronephrosis may relate to stricture at the anastomotic site.  2 urostomy tubes terminate in the neobladder rather than ureters.  No hydronephrosis on the left.  Couple punctate nonobstructing calculi within the neobladder.    12/19 urologist in [place] looposcopy, internalization of bilateral stents    11/19 creatinine 1.7,   GFR  31    Multiple surgeries for parastomal hernia.  Patient is been managed with chronic indwelling catheter drainage from her loop.      2001 InterStim placement - not using.  2001 neobladder converted to ileal conduit  1998 cystectomy with neobladder -  for IC    The patient also has a significant chronic health history including hypertension, recurrent urinary tract infections, chronic small bowel obstruction, anemia, multiple falls, and COPD.    history of severe malnutrition requiring long-term TPN previously.    Her son changes her urostomy bags.  The patient reports that she will need her nephroureteral catheters exchanged every 3 months.    history of urostomy stenosis.    She states she cannot tolerate any antibiotics aside from Levaquin and will not take anything aside from Levaquin for acute infections.    She currently uses [employer] products for her urostomy supplies as she states she can no longer use any other brand as they cause skin breakdown.     no family history of urologic cancers.     1-1/2 pack/day smoker x46 years    Patient has had several abdominal ventral hernia repairs.  She does have mesh.           Past Medical History  Allergies; Anxiety; Arthritis; Bipolar 1 disorder; Bipolar affective disorder; Cataract; Chronic pain; COPD; Depression; Forgetfulness; Gall Stones; Heart Attack; Heart Murmur; Hemorrhoids; Hernia; High blood pressure; High cholesterol; Hydronephrosis; Interstitial cystitis; Kidney stone; Migraine; Psychiatric disorder; Reflux;  Seizure; Sinus trouble         Past Surgical History  Caesarian section; Gallbladder removal; Hernia Repair; Interstem Placement; Nephrostomy With Tube Drainage; Urostomy         Medication List  albuterol sulfate 90 mcg/actuation inhalation HFA aerosol inhaler; aspirin 81 mg oral tablet,delayed release (DR/EC); buspirone 5 mg oral tablet; cetirizine 10 mg oral tablet; clonazepam 1 mg oral tablet; Combivent Respimat  mcg/actuation inhalation mist; duloxetine 60 mg oral capsule,delayed release(DR/EC); fenofibrate 160 mg oral tablet; ferrous sulfate 325 mg (65 mg iron) oral tablet; fluticasone propionate 50 mcg/actuation nasal spray,suspension; hydrocodone-acetaminophen 7.5-325 mg oral tablet; levofloxacin 250 mg oral tablet; nortriptyline 75 mg oral capsule; omeprazole 20 mg oral capsule,delayed release(DR/EC); Vitamin D2 1,250 mcg (50,000 unit) oral capsule; wheelchair with leg rest 0         Allergy List  Aleve; Biaxin; Cipro; clindamycin HCl; Flagyl; gemfibrozil; Keflex; Latuda; morphine; PENICILLINS; pravastatin; QUINOLONES; SULFA (SULFONAMIDES); TETRACYCLINES; Valium; vancomycin         Family Medical History  Family history of lung cancer; Family history of stroke; Family history of heart disease; Family history of diabetes mellitus; Family history of stomach cancer         Social History  Tobacco (Current every day)         Physical Examination                      Assessment  · Nephrostomy complication     997.5/N99.528  · Presence of urostomy     V44.6/Z93.6  · Hydronephrosis     591/N13.30      Plan  · Orders  o Physician Telephone Evaluation, 11-20 minutes ([id number]) - V44.6/Z93.6, 997.5/N99.528, 591/N13.30 - 10/30/2020  · Medications  o Medications have been Reconciled  o Transition of Care or Provider Policy  · Instructions  o Electronically Identified Patient Education Materials Provided Electronically           Patient has been deemed a poor surgical candidate for any other abdominal surgeries.  We  will move forward with internalization of her ureteral stent this coming week.  Patient was start levofloxacin 3 days before.    Urologic oncology has deemed her high risk for any abdominal surgeries.  We will acquire records      Patient does state she also has pain over her InterStim.  We discussed this in the coming weeks and we could remove this at some point in the future if we do think this is causing her pain once we get her stent internalized.                 Electronically Signed by: Ridge Frye MD -Author on October 30, 2020 10:10:21 AM

## 2021-05-13 NOTE — PROGRESS NOTES
Progress Note      Patient Name: Breanna Smith   Patient ID: [id number]   Sex: Female   YOB: 1960    Primary Care Provider: Ligia KUNZ   Referring Provider: Ligia KUNZ    Visit Date: September 3, 2020    Provider: JOAQUINA Love   Location: [facility]   Location Address: [address] [place] [address], [address] [place] [address]  [id number]   Location Phone: [phone]          Chief Complaint  · f/u  · episodes of vertigo - son is concerned about why  · had a fall 2 days ago and scrapped up her left arm. Does not feel that she needs imaging. It was a skin abrasion.   · needs a script for wheelchair      History Of Present Illness  Breanna Smith is a 59 year old /White female who presents for evaluation and treatment of: pt accomp by son. He reports she had 2 falls, one at home. she says she lost her balance.       Past Medical History  Disease Name Date Onset Notes   Allergies --  --    Anxiety --  --    Arthritis --  --    Bipolar 1 disorder --  --    Bipolar affective disorder --  --    Cataract --  --    Chronic pain --  --    COPD --  --    Depression --  --    Forgetfulness --  --    Gall Stones --  --    Heart Attack --  --    Heart Murmur --  --    Hemorrhoids --  --    Hernia --  --    High blood pressure --  --    High cholesterol --  --    Interstitial cystitis --  --    Kidney stone --  --    Migraine --  --    Psychiatric disorder --  --    Reflux --  --    Seizure --  --    Sinus trouble --  --          Past Surgical History  Procedure Name Date Notes   Caesarian section --  --    Gallbladder removal --  --    Hernia Repair --  --    Interstem Placement --  --    Urostomy --  --          Medication List  Name Date Started Instructions   albuterol sulfate 90 mcg/actuation inhalation HFA aerosol inhaler 08/20/2020 inhale 2 puffs (180 mcg) by inhalation route every 4 hours for 30 days   aspirin 81 mg oral tablet,delayed  release (DR/EC) 08/20/2020 take 1 tablet (81 mg) by oral route once daily for 30 days   buspirone 5 mg oral tablet 08/20/2020 take 1 tablet (5 mg) by oral route daily   cetirizine 10 mg oral tablet 08/20/2020 take 1 tablet by oral route once a day (at bedtime) for 30 days   clonazepam 1 mg oral tablet 08/20/2020 take 1 tablet by oral route three times a day as needed   Combivent Respimat  mcg/actuation inhalation mist 08/21/2020 inhale 1 puff by inhalation route 4 times per day ; may take additional puffs as needed not to exceed 6 puffs in 24hrs for 30 days   duloxetine 60 mg oral capsule,delayed release(DR/EC) 08/20/2020 take 2 capsules (120 mg) by oral route once daily for 30 days   fenofibrate 160 mg oral tablet 08/20/2020 take 1 tablet (160 mg) by oral route once daily for 30 days   ferrous sulfate 325 mg (65 mg iron) oral tablet 09/15/2020 take 1 tablet (325 mg) by oral route once daily for 30 days   fluticasone propionate 50 mcg/actuation nasal spray,suspension 08/20/2020 inhale 1 puff by nasal route 2 times a day for 30 days   hydrocodone-acetaminophen 7.5-325 mg oral tablet  take 1 tablet by oral route every 6 hours as needed for pain   levofloxacin 250 mg oral tablet 09/21/2020 take 1 tablet (250 mg) by oral route once daily for 10 days leading up to procedure   nortriptyline 75 mg oral capsule 08/20/2020 take 2 capsules (150 mg) by oral route once daily for 30 days   omeprazole 20 mg oral capsule,delayed release(DR/EC) 08/20/2020 take 1 capsule (20 mg) by oral route once daily 30 minutes to 1 hour before a meal for 30 days   Vitamin D2 1,250 mcg (50,000 unit) oral capsule 09/15/2020 tab 1 po weekly x 12   wheelchair with leg rest 0 09/03/2020 dx: vertigo R42, unsteady gait R26.81, frequent falls R29.6         Allergy List  Allergen Name Date Reaction Notes   Aleve --  --  --    Biaxin --  --  --    Cipro --  --  --    clindamycin HCl --  --  --    Flagyl --  --  --    gemfibrozil --  --  --    Keflex  --  --  --    Latuda --  --  --    morphine --  --  --    PENICILLINS --  --  --    pravastatin --  myalgia --    QUINOLONES --  --  --    SULFA (SULFONAMIDES) --  --  --    TETRACYCLINES --  --  --    Valium --  --  --    vancomycin --  --  --          Family Medical History  Disease Name Relative/Age Notes   Family history of lung cancer  --    Family history of stroke Father/   --    Family history of heart disease Father/  Mother/   --    Family history of diabetes mellitus  --    Family history of stomach cancer Father/   --          Social History  Finding Status Start/Stop Quantity Notes   Tobacco Current every day --/-- 1.5 ppd --          Review of Systems  · Constitutional  o Admits  o : she was up until 2:30 and then had to get up early this morning and she is sleepy. Her sleeping patterns are irregular  o Denies  o : night sweats  · Eyes  o Denies  o : double vision, blurred vision  · HENT  o Admits  o : . she has has a history of having dizzines her son reports when she lived in [place]. She denies being dizzy lately  o Denies  o : vertigo, recent head injury  · Breasts  o Denies  o : abnormal changes in breast size, additional breast symptoms except as noted in the HPI  · Cardiovascular  o Denies  o : chest pain, irregular heart beats, swelling in feet or legs  · Respiratory  o Admits  o : she does smoke 1-2 packs  o Denies  o : shortness of breath, productive cough  · Gastrointestinal  o Admits  o : She does eat and she is drinking per son  o Denies  o : nausea, vomiting, diarrhea  · Genitourinary  o Admits  o : Has urostomy states she did feel better after starting the antibiotic she had been given  o Denies  o : dysuria, urinary retention  · Integument  o Denies  o : hair growth change, new skin lesions  · Neurologic  o Admits  o : .no signs of confusion per son and patient  o Denies  o : altered mental status, seizures  · Musculoskeletal  o Denies  o : joint swelling, limitation of  "motion  · Endocrine  o Denies  o : cold intolerance, heat intolerance  · Heme-Lymph  o Denies  o : petechiae, lymph node enlargement or tenderness  · Allergic-Immunologic  o Denies  o : frequent illnesses      Vitals  Date Time BP Position Site L\R Cuff Size HR RR TEMP (F) WT  HT  BMI kg/m2 BSA m2 O2 Sat        09/03/2020 09:16 AM 95/56 Sitting      96.5 108lbs 9oz 5'  4\" 18.63 1.49 91 %          Physical Examination  · Constitutional  o Appearance  o : well-nourished, well developed, alert, in no acute distress  · Head and Face  o Face  o :   § Inspection  § : No edema  · Eyes  o Conjunctivae  o : conjunctivae normal  o Sclerae  o : sclerae white  o Pupils and Irises  o : pupils equal, round, and reactive to light and accommodation bilaterally  o Eyelids/Ocular Adnexae  o : eyelid appearance normal, no exudates present, eye moisture level normal  · Ears, Nose, Mouth and Throat  o Ears  o : external ear auricle normal, otic canal normal, TM with no reddness, effusion, retraction  o Nose  o : external normal, nasal mucosa normal, turbinates normal  o Oral Cavity  o : tongue no lesion, oral mucosa normal  o Throat  o : no erythemia, exudate or lesions  · Neck  o Inspection/Palpation  o : normal appearance, no masses or tenderness, trachea midline, no enlarged cervical or supraclavicular lymphnodes palpated  o Thyroid  o : gland size normal, nontender, no nodules or masses present on palpation, thyroid motion normal during swallowing  · Respiratory  o Respiratory Effort  o : breathing unlabored  o Inspection of Chest  o : normal appearance, no retractions  o Auscultation of Lungs  o : normal breath sounds throughout  · Cardiovascular  o Heart  o :   § Auscultation of Heart  § : regular rate and rhythm without murmur  o Peripheral Vascular System  o :   § Extremities  § : No edema in right foot or leg left just a little puffy top of the foot  · Gastrointestinal  o Abdominal Examination  o : abdomen nontender to " palpation, normal bowel sounds, tone normal without rigidity or guarding, no masses present, abdomen scaphoid upon supine  · Musculoskeletal  o General  o :   § General Musculoskeletal  § : No joint swelling or deformity., Muscle tone, strength, and development grossly normal.  · Skin and Subcutaneous Tissue  o General Inspection  o : skin olive coloring, thin and shiny, bruises easily, turgor fair  · Neurologic  o Mental Status Examination  o : judgement, insight intact, modd and affect appropriate. she nodded off but easily roused when you tried getting her attention. affect if flat but she will give eye contact  o Motor Examination  o : strength grossly intact in all four extremities  o Gait and Station  o : guarded gait, able to stand without difficulty, uses a rollator walker. pt is weak          Assessment  · Anemia     285.9/D64.9  · Vertigo     780.4/R42  · Fall     E888.9/W19.XXXA  · Unsteady gait     781.2/R26.81  · Frequent falls     V15.88/R29.6  · Impaired mobility and endurance     V49.89/Z74.09      Plan  · Orders  o ACO-39: Current medications updated and reviewed () - - 09/03/2020  o Iron Profile (Iron, TIBC, and Transferrin) (IRONP) - 285.9/D64.9 - 09/03/2020  o Ferritin (12570) - 285.9/D64.9 - 09/03/2020  · Medications  o wheelchair with leg rest   SIG: dx: vertigo R42, unsteady gait R26.81, frequent falls R29.6   DISP: (1) each with 0 refills  Prescribed on 09/03/2020     o Medications have been Reconciled  o Transition of Care or Provider Policy  · Instructions  o Patient was educated/instructed on their diagnosis, treatment and medications prior to discharge from the clinic today.  o reviewed her labs and discussed her low hgb. could be contributing to her fatigue and could cause imbalance. discussed going to the ER for an evaluation. very concerned about this. discussed risk of worsening weakness, shortness of breath,headache. Pt waited before she finally said she wasn't going anywhere.  if she goes she will go tomorrow. Son and I both tried urging her to go but she said no. I told her I would call tomorrow to check if she had gone and would call a report on her before she got there. she said again she would go tomorrow  o we discussed son calling the office in [place] and reminding them to send records as she is not a good historian and the son is limited in his knowledge of her health when she was in [place]. He just knew she was getting much help and this is why he went and brought her here.            Electronically Signed by: Ligia Andrade, APRN -Author on September 21, 2020 05:31:07 PM

## 2021-05-13 NOTE — PROGRESS NOTES
"   Progress Note      Patient Name: Breanna Smith   Patient ID: [id number]   Sex: Female   YOB: 1960    Primary Care Provider: Ligia KUNZ   Referring Provider: Will Jaeger MD    Visit Date: November 17, 2020    Provider: Vincenzo Shay MD   Location: [facility]   Location Address: [address] [place] [address]  [id number]   Location Phone: [phone]          Chief Complaint  · Depressed  · Anxious     Patient is here for a follow up    \"Rough.\"       History Of Present Illness  Breanna Smith is a 60 year old /White female who presents to the office today referred by Will Jaeger MD.   Chart: 59F, hx of right hydronephrosis/bilateral ureter stents/right ureter catheter and bipolar disorder. On clonazepam 1 mg tid, duloxetine 120 mg daily, nortriptyline 150 daily. 9/2020: Cr 1.64, Hct 27.6 low, K 3.4 low. No EKG, head imaging. MARLENI-7 score November 2 is 19, severe. PHQ 9 score is 24, severe.   Patient reports her time these past 2 weeks has been \"rough.\" No tears today. Has her own room at her son's house. Also has a pet Wilbertua named Subha. Lost her other Chilulyua, Lindsay, a few weeks prior to moving from [place] to [place] (had to put her down due to cancer). Patient is looking for low income properties to rent in the area, but is having trouble. Enjoys psychotherapy. Does not need a refill on any medications. No SI HI AVH.   Feels ignored at her son's house by his children. States they make dinner and do not invite her. Patient has been buying her own \"TV dinners\" to eat. Enjoys [employer] stuff peppers.   ...   ...       Past Medical History  Disease Name Date Onset Notes   Allergies --  --    Anxiety --  --    Arthritis --  --    Bipolar 1 disorder --  --    Bipolar affective disorder --  --    Cataract --  --    Chronic pain --  --    COPD --  --    Depression --  --    Forgetfulness --  --    Gall Stones --  --    Heart Attack --  --  "   Heart Murmur --  --    Hemorrhoids --  --    Hernia --  --    High blood pressure --  --    High cholesterol --  --    Hydronephrosis --  --    Interstitial cystitis --  --    Kidney stone --  --    Migraine --  --    Psychiatric disorder --  --    Reflux --  --    Seizure --  --    Sinus trouble --  --          Past Surgical History  Caesarian section; Gallbladder removal; Hernia Repair; Interstem Placement; Nephrostomy With Tube Drainage; Urostomy         Medication List  Name Date Started Instructions   aspirin 81 mg oral tablet,delayed release (DR/EC) 08/20/2020 take 1 tablet (81 mg) by oral route once daily for 30 days   cetirizine 10 mg oral tablet 08/20/2020 take 1 tablet by oral route once a day (at bedtime) for 30 days   clonazepam 1 mg oral tablet 11/02/2020 take 1 tablet by oral route three times a day as needed   Combivent Respimat  mcg/actuation inhalation mist 08/21/2020 inhale 1 puff by inhalation route 4 times per day ; may take additional puffs as needed not to exceed 6 puffs in 24hrs for 30 days   Daily Multi-Vitamin oral tablet  take 1 tablet by oral route daily   duloxetine 60 mg oral capsule,delayed release(DR/EC) 08/20/2020 take 2 capsules (120 mg) by oral route once daily for 30 days   fenofibrate 160 mg oral tablet 08/20/2020 take 1 tablet (160 mg) by oral route once daily for 30 days   ferrous sulfate 325 mg (65 mg iron) oral tablet 09/15/2020 take 1 tablet (325 mg) by oral route once daily for 30 days   fluticasone propionate 50 mcg/actuation nasal spray,suspension 08/20/2020 inhale 1 puff by nasal route 2 times a day for 30 days   hydrocodone-acetaminophen 7.5-325 mg oral tablet  take 1 tablet by oral route every 8 hours as needed   nortriptyline 75 mg oral capsule 08/20/2020 take 2 capsules (150 mg) by oral route once daily for 30 days   omeprazole 20 mg oral capsule,delayed release(DR/EC) 08/20/2020 take 1 capsule (20 mg) by oral route once daily 30 minutes to 1 hour before a  meal for 30 days   Vitamin C 500 mg oral capsule, extended release  take 1 capsule by oral route daily   Vitamin D2 1,250 mcg (50,000 unit) oral capsule 09/15/2020 tab 1 po weekly x 12   wheelchair with leg rest 0 09/03/2020 dx: vertigo R42, unsteady gait R26.81, frequent falls R29.6         Allergy List  Allergen Name Date Reaction Notes   Aleve --  --  --    Biaxin --  --  --    Cipro --  --  --    clindamycin HCl --  --  --    Flagyl --  --  --    gemfibrozil --  --  --    Keflex --  --  --    Latuda --  --  --    PENICILLINS --  --  --    pravastatin --  myalgia --    QUINOLONES --  --  --    SULFA (SULFONAMIDES) --  --  --    TETRACYCLINES --  --  --    vancomycin --  --  --        Allergies Reconciled  Family Medical History  Family history of lung cancer; Family history of stroke; Family history of heart disease; Family history of diabetes mellitus; Family history of stomach cancer         Social History  Finding Status Start/Stop Quantity Notes   Tobacco Current every day --/-- 1.5 ppd --          Review of Systems  · Constitutional  o Admits  o : fatigue  o Denies  o : night sweats  · Eyes  o Denies  o : double vision, blurred vision  · HENT  o Admits  o : vertigo  o Denies  o : recent head injury  · Breasts  o Denies  o : abnormal changes in breast size, additional breast symptoms except as noted in the HPI  · Cardiovascular  o Denies  o : chest pain, irregular heart beats  · Respiratory  o Admits  o : shortness of breath  o Denies  o : productive cough  · Gastrointestinal  o Denies  o : nausea, vomiting  · Genitourinary  o Denies  o : dysuria, urinary retention  · Integument  o Denies  o : hair growth change, new skin lesions  · Neurologic  o Admits  o : altered mental status  o Denies  o : seizures  · Musculoskeletal  o Denies  o : joint swelling, limitation of motion  · Endocrine  o Denies  o : cold intolerance, heat intolerance  · Psychiatric  o Admits  o : anxiety,  "depression  · Heme-Lymph  o Denies  o : petechiae, lymph node enlargement or tenderness  · Allergic-Immunologic  o Denies  o : frequent illnesses      Vitals  Date Time BP Position Site L\R Cuff Size HR RR TEMP (F) WT  HT  BMI kg/m2 BSA m2 O2 Sat FR L/min FiO2 HC       11/17/2020 10:55 /42 Sitting    83 - R 20  113lbs 2oz 5'  4\" 19.42 1.52 100 %  21%          Physical Examination  · Mental Status Exam  o Appearance  o : good eye contact, normal street clothes, groomed, wearing a mask  o Behavior  o : pleasant and cooperative  o Motor  o : Mild psychomotor retardation  o Speech  o : Some latency, otherwise normal rhythm, rate, volume, tone, not hyperverbal, not pressured, normal prosidy  o Mood  o : \"rough\"  o Affect  o : constricted, able to smile  o Thought Content  o : negative suicidal ideations, negative homicidal ideations, negative obsessions  o Perceptions  o : negative auditory hallucinations, negative visual hallucinations  o Thought Process  o : mostly linear today  o Insight/Judgement  o : fair/fair  o Cognition  o : grossly intact today  o Attention  o : intact          Assessment  · Tobacco abuse counseling       Tobacco abuse counseling     V65.42/Z71.6  · Anxiety     300.02/F41.1  · Depression     296.31       Presentation most consistent with anxiety disorder unspecified, depressive disorder unspecified.  Patient is not able to provide a compelling history for bipolar disorder, either 1 or 2.    Stable. No medication changes today. 25 minutes of psychotherapy.    Patient does not present with any acute safety concerns.  Denies SI, plan, or intent.    Continue medications as prescribed.  I refilled her Klonopin today as she only has \"1 pill left,\" and I would not want her to go into withdrawal, especially with an impending surgical procedure.    Return in 2 weeks. Referral for psychotherapy and case management.     Problems Reconciled  Plan  · Orders  o Smoking and tobacco cessation counseling " "visit for the asymptomatic patient; intermediate, greater than 3 minutes, up to 10 minutes [facility] ([id number]) - V65.42/Z71.6 - 11/16/2020  o JACKIE Report (KASPR) - 300.02/F41.1, 296.31, V65.42/Z71.6 - 11/17/2020  o PSYCHOTHERAPY CONSULTATION (PSMELISSA) - 300.02/F41.1, 296.31, V65.42/Z71.6 - 11/17/2020   Supportive psychotherapy and CASE MANAGEMENT for low income housing  o ACO-39: Current medications updated and reviewed (1159F, ) - - 11/16/2020  · Instructions  o Tobacco and smoking cessation counseling for more than 3 minutes was completed.  o Consults: consider neurology for further management of patient's \"memory issues.\"  o Risk Assessment: Risk of self-harm acutely is moderate to high. Risk factors include previous suicide attempt, recent SI, possible access to weapons, some AODA, depressive disorder, anxiety disorder. Protective factors include no present SI, no family history of suicide attempts, healthcare seeking, future orientation in that she is preparing for surgery in 2 days, willingness to engage in care. Risk of self-harm chronically is also moderate to high, but could be further elevated in the event of treatment noncompliance and/or AODA.  o Safety: No acute safety concerns.  o Medications: Continue duloxetine 120 mg p.o. daily, nortriptyline 150 mg p.o. daily, Klonopin 1 mg p.o. 3 times daily. Patient signed controlled substances education agreement today. Jackie report ordered, it is pending due to manual preparation. Consider switching the patient from duloxetine to a different medication. Patient reports she has been on nortriptyline for 30 years. It will be very difficult to titrate the patient down on Klonopin as she is also been on this chronically.  o Therapy: 25 minutes of therapy. Referral made for both psychotherapy and possible case management, which I understand [facility] in counseling can do.  o Labs/Studies: UDS ordered at last visit.  o Follow Up: 2 " weeks.  · Disposition  o Follow Up in 2 weeks.            Electronically Signed by: Vincenzo Shay MD -Author on November 17, 2020 12:57:21 PM

## 2021-05-13 NOTE — PROGRESS NOTES
Progress Note      Patient Name: Breanna Smith   Patient ID: [id number]   Sex: Female   YOB: 1960    Primary Care Provider: Ligia KUNZ   Referring Provider: Ligia KUNZ    Visit Date: September 18, 2020    Provider: Ridge Frye MD   Location: [facility]   Location Address: [address] [place] [address]  [id number]   Location Phone: [phone]          Chief Complaint  · pt is here for urological concerns      History Of Present Illness     59-year-old  female w h/o IC status post cystectomy many years ago with neobladder creation that was converted to ieal conduit.    has bilateral 7 Uzbek single J nephroureteral catheters.  The patient states that the last time they were changed was in April or May 2020.  She states that these were placed by her urologist in [place] related to concern for a fistula of some sort?.    some compliance issues in the past.    1/20 CT abdomen/pelvis with and withoutnonobstructing left renal calculi, 4 mm largest.  Severe right-sided hydronephrosis may relate to stricture at the anastomotic site.  2 urostomy tubes terminate in the neobladder rather than ureters.  No hydronephrosis on the left.  Couple punctate nonobstructing calculi within the neobladder.    12/19 urologist in [place] looposcopy, internalization of bilateral stents    11/19 creatinine 1.7,   GFR  31    Multiple surgeries for parastomal hernia.  Patient is been managed with chronic indwelling catheter drainage from her loop.      2001 InterStim placement - not using.  2001 neobladder converted to ileal conduit  1998 cystectomy with neobladder      h/o MI.  COPD    ASA 81    Previous    The patient also has a significant chronic health history including hypertension, recurrent urinary tract infections, chronic small bowel obstruction, anemia, multiple falls, and COPD.    history of severe malnutrition requiring long-term TPN  previously.    Her son changes her urostomy bags.  The patient reports that she will need her nephroureteral catheters exchanged every 3 months.    history of urostomy stenosis.    She states she cannot tolerate any antibiotics aside from Levaquin and will not take anything aside from Levaquin for acute infections.    She currently uses [employer] for her urostomy supplies as she states she can no longer use any other brand as they cause skin breakdown.     no family history of urologic cancers.     1-1/2 pack/day smoker x46 years    Patient has had several abdominal ventral hernia repairs.  She does have mesh.           Past Medical History  Allergies; Anxiety; Arthritis; Bipolar 1 disorder; Bipolar affective disorder; Cataract; Chronic pain; COPD; Depression; Forgetfulness; Gall Stones; Heart Attack; Heart Murmur; Hemorrhoids; Hernia; High blood pressure; High cholesterol; Interstitial cystitis; Kidney stone; Migraine; Psychiatric disorder; Reflux; Seizure; Sinus trouble         Past Surgical History  Caesarian section; Gallbladder removal; Hernia Repair; Interstem Placement; Urostomy         Medication List  albuterol sulfate 90 mcg/actuation inhalation HFA aerosol inhaler; aspirin 81 mg oral tablet,delayed release (DR/EC); buspirone 5 mg oral tablet; cetirizine 10 mg oral tablet; clonazepam 1 mg oral tablet; Combivent Respimat  mcg/actuation inhalation mist; duloxetine 60 mg oral capsule,delayed release(DR/EC); fenofibrate 160 mg oral tablet; ferrous sulfate 325 mg (65 mg iron) oral tablet; fluticasone propionate 50 mcg/actuation nasal spray,suspension; hydrocodone-acetaminophen 7.5-325 mg oral tablet; levofloxacin 250 mg oral tablet; nortriptyline 75 mg oral capsule; omeprazole 20 mg oral capsule,delayed release(DR/EC); Vitamin D2 1,250 mcg (50,000 unit) oral capsule; wheelchair with leg rest 0         Allergy List  Aleve; Biaxin; Cipro; clindamycin HCl; Flagyl; gemfibrozil; Keflex; Latuda;  "morphine; PENICILLINS; pravastatin; QUINOLONES; SULFA (SULFONAMIDES); TETRACYCLINES; Valium; vancomycin       Allergies Reconciled  Family Medical History  Family history of lung cancer; Family history of stroke; Family history of heart disease; Family history of diabetes mellitus; Family history of stomach cancer         Social History  Tobacco (Current every day)         Review of Systems  · Constitutional  o Denies  o : chills, fever  · Gastrointestinal  o Denies  o : nausea, vomiting      Vitals  Date Time BP Position Site L\R Cuff Size HR RR TEMP (F) WT  HT  BMI kg/m2 BSA m2 O2 Sat HC       09/18/2020 09:42 AM       18  108lbs 0oz 5'  4\" 18.54 1.49           Physical Examination  · Constitutional  o Appearance  o : Well-appearing, well-developed, in no acute distress  · Respiratory  o Respiratory Effort  o : Unlabored breathing  o Auscultation of Lungs  o : Unlabored breathing, clear to auscultation bilaterally  · Cardiovascular  o Heart  o :   § Auscultation of Heart  § : Regular rate and rhythm, no murmurs  · Gastrointestinal  o Abdominal Examination  o : Nontender, nondistended, no rigidity or guarding, no hepatosplenomegaly  · Neurologic  o Mental Status Examination  o :   § Orientation  § : Grossly oriented to person, place and time, judgment and insight intact, normal mood and affect     With urostomy with black stent emanating from this.    Midline scar               Assessment  · Nephrostomy complication     997.5/N99.528  · Presence of urostomy     V44.6/Z93.6  · Hydronephrosis     591/N13.30      Plan  · Orders  o Urine Culture (Cath) [facility] ([id number]) - 591/N13.30, V44.6/Z93.6 - 09/18/2020  · Medications  o Medications have been Reconciled  o Transition of Care or Provider Policy  · Instructions  o Electronically Identified Patient Education Materials Provided Electronically         CT images and read reviewed with patient today    Records reviewed today and summarized in the chart.    Patient with no " left-sided hydronephrosis.  At this time we will plan on getting her set up for right nephroureteral catheter placement.  Discussed this with interventional radiology.    After that we will plan on internalizing her right ureteral stent.  There is some question of stomal stenosis so I will place a catheter in her ostomy today.      Previously placed ureteral catheters have fallen down into her conduit.  This was confirmed on CT and these were removed today.    Urine culture will be sent today    Start levofloxacin 250 mg QD.  Risks/benefits and side effects discussed.    Greater than 1 hour was used in counseling and coordination of care, with greater than 51% of this in face-to-face counseling                 Electronically Signed by: Ridge Frye MD -Author on September 18, 2020 01:05:48 PM

## 2021-05-13 NOTE — PROGRESS NOTES
"   Progress Note      Patient Name: Breanna Smith   Patient ID: [id number]   Sex: Female   YOB: 1960    Primary Care Provider: Ligia KUNZ   Referring Provider: Will Jaeger MD    Visit Date: December 14, 2020    Provider: Vincenzo Shay MD   Location: [facility]   Location Address: [address] [place] [address]  [address] [place] [address]  [id number]   Location Phone: [phone]          Chief Complaint  · Depressed  · Anxious     Patient is here for a follow up 2 wks    \"I can't talk to my mom.\"               History Of Present Illness  Breanna Smith is a 60 year old /White female who presents to the office today referred by Will Jaeger MD.   Chart: 59F, hx of right hydronephrosis/bilateral ureter stents/right ureter catheter and bipolar disorder. On clonazepam 1 mg tid, duloxetine 120 mg daily, nortriptyline 150 daily. 9/2020: Cr 1.64, Hct 27.6 low, K 3.4 low. No EKG, head imaging. MARLENI-7 score November 2 is 19, severe. PHQ 9 score is 24, severe.   Audio visit via telephone for 24 minutes as patient cannot figure out Zoom. Patient is accepting of and agreeable to appointment. The appointment consisted of the patient and I only. Interview: Patient reports feeling extremely sad because her sister took away the phone that her mom uses to talk to patient. Patient reports \"my mom is my best friend,\" and that they talk to 1 another every other day, if not more frequently. Patient is \"very depressed,\" because she cannot talk to her. Presently her mom lives with her sister. The house is in mom's and brother-in-law's name as patient's sister has \"poor credit.\" Patient reports that her mom is also blind. We discussed numerous strategies for the patient to somehow remain in contact with her mom. Patient also reports worsening insomnia. No SI HI AVH.   ...   ...   ...       Past Medical History  Disease Name Date Onset Notes   Allergies --  --    Anxiety --  --    Arthritis " --  --    Bipolar 1 disorder --  --    Bipolar affective disorder --  --    Cataract --  --    Chronic pain --  --    COPD --  --    Depression --  --    Forgetfulness --  --    Gall Stones --  --    Heart Attack --  --    Heart Murmur --  --    Hemorrhoids --  --    Hernia --  --    High blood pressure --  --    High cholesterol --  --    Hydronephrosis --  --    Interstitial cystitis --  --    Kidney stone --  --    Migraine --  --    Psychiatric disorder --  --    Reflux --  --    Seizure --  --    Sinus trouble --  --          Past Surgical History  Procedure Name Date Notes   Caesarian section --  --    Gallbladder removal --  --    Hernia Repair --  --    Interstem Placement --  --    Nephrostomy With Tube Drainage --  --    Urostomy --  --          Medication List  Name Date Started Instructions   aspirin 81 mg oral tablet,delayed release (DR/EC) 08/20/2020 take 1 tablet (81 mg) by oral route once daily for 30 days   cetirizine 10 mg oral tablet 08/20/2020 take 1 tablet by oral route once a day (at bedtime) for 30 days   clonazepam 1 mg oral tablet 11/02/2020 take 1 tablet by oral route three times a day as needed   Combivent Respimat  mcg/actuation inhalation mist 08/21/2020 inhale 1 puff by inhalation route 4 times per day ; may take additional puffs as needed not to exceed 6 puffs in 24hrs for 30 days   Daily Multi-Vitamin oral tablet  take 1 tablet by oral route daily   duloxetine 60 mg oral capsule,delayed release(DR/EC) 12/07/2020 take 2 capsules (120 mg) by oral route once daily for 90 days   fenofibrate 160 mg oral tablet 08/20/2020 take 1 tablet (160 mg) by oral route once daily for 30 days   ferrous sulfate 325 mg (65 mg iron) oral tablet 09/15/2020 take 1 tablet (325 mg) by oral route once daily for 30 days   fluticasone propionate 50 mcg/actuation nasal spray,suspension 08/20/2020 inhale 1 puff by nasal route 2 times a day for 30 days   hydrocodone-acetaminophen 7.5-325 mg oral tablet  take  1 tablet by oral route every 8 hours as needed   nortriptyline 75 mg oral capsule 12/07/2020 take 2 capsules (150 mg) by oral route once daily for 90 days   omeprazole 20 mg oral capsule,delayed release(DR/EC) 08/20/2020 take 1 capsule (20 mg) by oral route once daily 30 minutes to 1 hour before a meal for 30 days   Vitamin D2 1,250 mcg (50,000 unit) oral capsule 09/15/2020 tab 1 po weekly x 12   wheelchair with leg rest 0 09/03/2020 dx: vertigo R42, unsteady gait R26.81, frequent falls R29.6         Allergy List  Allergen Name Date Reaction Notes   Aleve --  --  --    Biaxin --  --  --    Cipro --  --  --    clindamycin HCl --  --  --    Flagyl --  --  --    gemfibrozil --  --  --    Keflex --  --  --    Latuda --  --  --    PENICILLINS --  --  --    pravastatin --  myalgia --    QUINOLONES --  --  --    SULFA (SULFONAMIDES) --  --  --    TETRACYCLINES --  --  --    vancomycin --  --  --          Family Medical History  Disease Name Relative/Age Notes   Family history of lung cancer  --    Family history of stroke Father/   --    Family history of heart disease Father/  Mother/   --    Family history of diabetes mellitus  --    Family history of stomach cancer Father/   --          Social History  Finding Status Start/Stop Quantity Notes   Tobacco Current every day --/-- 1.5 ppd --          Review of Systems  · Constitutional  o Admits  o : fatigue, night sweats  · Eyes  o Admits  o : changes in vision  o Denies  o : double vision, blurred vision  · HENT  o Admits  o : vertigo  o Denies  o : recent head injury  · Breasts  o Denies  o : abnormal changes in breast size, additional breast symptoms except as noted in the HPI  · Cardiovascular  o Admits  o : irregular heart beats  o Denies  o : chest pain  · Respiratory  o Admits  o : shortness of breath  o Denies  o : productive cough  · Gastrointestinal  o Denies  o : nausea, vomiting  · Genitourinary  o Denies  o : dysuria, urinary  "retention  · Integument  o Admits  o : hair growth change  o Denies  o : new skin lesions  · Neurologic  o Admits  o : memory difficulties  o Denies  o : altered mental status, seizures  · Musculoskeletal  o Admits  o : joint swelling  o Denies  o : limitation of motion  · Endocrine  o Admits  o : cold intolerance  o Denies  o : heat intolerance  · Psychiatric  o Admits  o : anxiety, depression  · Heme-Lymph  o Denies  o : petechiae, lymph node enlargement or tenderness  · Allergic-Immunologic  o Denies  o : frequent illnesses      Physical Examination  · Mental Status Exam  o Appearance  o : Cannot assess  o Behavior  o : pleasant and cooperative  o Motor  o : Cannot assess  o Speech  o : Some latency, otherwise normal rhythm, rate, volume, tone, not hyperverbal, not pressured, normal prosidy  o Mood  o : \"Very depressed\"  o Affect  o : constricted, tearful on the phone  o Thought Content  o : negative suicidal ideations, negative homicidal ideations, negative obsessions  o Perceptions  o : negative auditory hallucinations, negative visual hallucinations  o Thought Process  o : linear for brief interview  o Insight/Judgement  o : fair/fair  o Cognition  o : grossly intact today  o Attention  o : intact          Assessment  · Anxiety     300.02/F41.1  · Depression     296.31       Presentation most consistent with anxiety disorder unspecified, depressive disorder unspecified.  Patient is not able to provide a compelling history for bipolar disorder, either 1 or 2.    Perhaps slightly worse today owing to the recent psychosocial stressor of not being able to communicate with her mom.  Start hydroxyzine to help with insomnia.     Patient does not present with any acute safety concerns.  Denies SI, plan, or intent.    Continue medications as prescribed.    Return in 1 weeks. F/U on referral for psychotherapy and case management.       Plan  · Orders  o ACO-39: Current medications updated and reviewed (0641A, ) - - " "12/14/2020  · Medications  o hydroxyzine HCl 50 mg oral tablet   SIG: take 1 tablet by oral route once a day (at bedtime) for 30 days   DISP: (30) Tablet with 1 refills  Prescribed on 12/14/2020     o Medications have been Reconciled  o Transition of Care or Provider Policy  · Instructions  o Consults: consider neurology for further management of patient's \"memory issues.\"  o Risk Assessment: Risk of self-harm acutely is moderate to high. Risk factors include previous suicide attempt, recent SI, possible access to weapons, some AODA, depressive disorder, anxiety disorder. Protective factors include no present SI, no family history of suicide attempts, healthcare seeking, future orientation in that she is preparing for surgery in 2 days, willingness to engage in care. Risk of self-harm chronically is also moderate to high, but could be further elevated in the event of treatment noncompliance and/or AODA.  o Safety: No acute safety concerns.  o Medications: START hydroxyzine 50 mg PO QHS for insomnia. Risks, benefits, side effects discussed with patient including GI upset, sedation, dizziness, grogginess the following day, prolongation of the QTc interval. After discussion of these risks and benefits, the patient voiced understanding and agreed to proceed. Continue duloxetine 120 mg p.o. daily, nortriptyline 150 mg p.o. daily, Klonopin 1 mg p.o. 3 times daily. Patient signed controlled substances education agreement today. Nitin report ordered, it is pending due to manual preparation. Consider switching the patient from duloxetine to a different medication. Patient reports she has been on nortriptyline for 30 years. It will be very difficult to titrate the patient down on Klonopin as she is also been on this chronically.  o Therapy: 21 minutes of therapy. Status post referral made for both psychotherapy and possible case management, which I understand [facility] in counseling can do.  o Labs/Studies: " Follow up on UDS.  o Follow Up: 1 weeks.            Electronically Signed by: Vincenzo Shay MD -Author on December 14, 2020 11:22:31 AM

## 2021-05-14 NOTE — PROGRESS NOTES
"   Progress Note      Patient Name: Breanna Smith   Patient ID: [id number]   Sex: Female   YOB: 1960    Primary Care Provider: Ligia KUNZ   Referring Provider: Will Jaeger MD    Visit Date: December 23, 2020    Provider: Vincenzo Shay MD   Location: [facility]   Location Address: [address] [place] [address]  [id number]   Location Phone: [phone]          Chief Complaint  · Depressed  · Anxious     \"I am a little depressed because of the holidays.\"                   History Of Present Illness  Breanna Smith is a 60 year old /White female who presents to the office today referred by Will Jaeger MD.   Chart: 59F, hx of right hydronephrosis/bilateral ureter stents/right ureter catheter and bipolar disorder. On clonazepam 1 mg tid, duloxetine 120 mg daily, nortriptyline 150 daily. 9/2020: Cr 1.64, Hct 27.6 low, K 3.4 low. No EKG, head imaging. MARLENI-7 score November 2 is 19, severe. PHQ 9 score is 24, severe.   Audio visit via telephone for 25 minutes as patient cannot figure out Zoom. Patient is accepting of and agreeable to appointment. The appointment consisted of the patient and I only. Interview: Patient reports she feels somewhat depressed. Reports that the situation at home is \"about the same.\" Reports that her son's ex-girlfriend will buy food for herself and her kids, but not by food for the patient's son and the patient. Patient also reports that her son buys food for the whole family when he goes to the grocery store. Patient reports her refrigerator broke recently, so she cannot store food in it anymore. All the food she stores in the fridge in the kitchen gets eaten by the kids. Otherwise stable; some benefit from hydroxyzine for insomnia. Patient needs a refill on her clonazepam. No SI HI AVH.   ...   ...   ...       Past Medical History  Disease Name Date Onset Notes   Allergies --  --    Anxiety --  --    Arthritis --  --  "   Bipolar 1 disorder --  --    Bipolar affective disorder --  --    Cataract --  --    Chronic pain --  --    COPD --  --    Depression --  --    Forgetfulness --  --    Gall Stones --  --    Heart Attack --  --    Heart Murmur --  --    Hemorrhoids --  --    Hernia --  --    High blood pressure --  --    High cholesterol --  --    Hydronephrosis --  --    Interstitial cystitis --  --    Kidney stone --  --    Migraine --  --    Psychiatric disorder --  --    Reflux --  --    Seizure --  --    Sinus trouble --  --          Past Surgical History  Procedure Name Date Notes   Caesarian section --  --    Gallbladder removal --  --    Hernia Repair --  --    Interstem Placement --  --    Nephrostomy With Tube Drainage --  --    Urostomy --  --          Medication List  Name Date Started Instructions   aspirin 81 mg oral tablet,delayed release (DR/EC) 08/20/2020 take 1 tablet (81 mg) by oral route once daily for 30 days   cetirizine 10 mg oral tablet 08/20/2020 take 1 tablet by oral route once a day (at bedtime) for 30 days   clonazepam 1 mg oral tablet 11/02/2020 take 1 tablet by oral route three times a day as needed   Combivent Respimat  mcg/actuation inhalation mist 08/21/2020 inhale 1 puff by inhalation route 4 times per day ; may take additional puffs as needed not to exceed 6 puffs in 24hrs for 30 days   Daily Multi-Vitamin oral tablet  take 1 tablet by oral route daily   duloxetine 60 mg oral capsule,delayed release(DR/EC) 12/07/2020 take 2 capsules (120 mg) by oral route once daily for 90 days   fenofibrate 160 mg oral tablet 08/20/2020 take 1 tablet (160 mg) by oral route once daily for 30 days   ferrous sulfate 325 mg (65 mg iron) oral tablet 09/15/2020 take 1 tablet (325 mg) by oral route once daily for 30 days   fluticasone propionate 50 mcg/actuation nasal spray,suspension 08/20/2020 inhale 1 puff by nasal route 2 times a day for 30 days   hydrocodone-acetaminophen 7.5-325 mg oral tablet  take 1 tablet  by oral route every 8 hours as needed   hydroxyzine HCl 50 mg oral tablet 12/14/2020 take 1 tablet by oral route once a day (at bedtime) for 30 days   nortriptyline 75 mg oral capsule 12/07/2020 take 2 capsules (150 mg) by oral route once daily for 90 days   omeprazole 20 mg oral capsule,delayed release(DR/EC) 08/20/2020 take 1 capsule (20 mg) by oral route once daily 30 minutes to 1 hour before a meal for 30 days   Vitamin D2 1,250 mcg (50,000 unit) oral capsule 09/15/2020 tab 1 po weekly x 12   wheelchair with leg rest 0 09/03/2020 dx: vertigo R42, unsteady gait R26.81, frequent falls R29.6         Allergy List  Allergen Name Date Reaction Notes   Aleve --  --  --    Biaxin --  --  --    Cipro --  --  --    clindamycin HCl --  --  --    Flagyl --  --  --    gemfibrozil --  --  --    Keflex --  --  --    Latuda --  --  --    PENICILLINS --  --  --    pravastatin --  myalgia --    QUINOLONES --  --  --    SULFA (SULFONAMIDES) --  --  --    TETRACYCLINES --  --  --    vancomycin --  --  --          Family Medical History  Disease Name Relative/Age Notes   Family history of lung cancer  --    Family history of stroke Father/   --    Family history of heart disease Father/  Mother/   --    Family history of diabetes mellitus  --    Family history of stomach cancer Father/   --          Social History  Finding Status Start/Stop Quantity Notes   Tobacco Current every day --/-- 1.5 ppd --          Review of Systems  · Constitutional  o Admits  o : fatigue, night sweats  · Eyes  o Admits  o : double vision, blurred vision  · HENT  o Admits  o : vertigo  o Denies  o : recent head injury  · Breasts  o Denies  o : abnormal changes in breast size, additional breast symptoms except as noted in the HPI  · Cardiovascular  o Denies  o : chest pain, irregular heart beats  · Respiratory  o Admits  o : shortness of breath  o Denies  o : productive cough  · Gastrointestinal  o Denies  o : nausea,  "vomiting  · Genitourinary  o Denies  o : dysuria, urinary retention  · Integument  o Denies  o : hair growth change, new skin lesions  · Neurologic  o Admits  o : altered mental status  o Denies  o : seizures  · Musculoskeletal  o Admits  o : joint swelling, limitation of motion  · Endocrine  o Denies  o : cold intolerance, heat intolerance  · Heme-Lymph  o Denies  o : petechiae, lymph node enlargement or tenderness  · Allergic-Immunologic  o Denies  o : frequent illnesses      Physical Examination  · Mental Status Exam  o Appearance  o : Cannot assess  o Behavior  o : pleasant and cooperative  o Motor  o : Cannot assess  o Speech  o : Some latency, otherwise normal rhythm, rate, volume, tone, not hyperverbal, not pressured, normal prosidy  o Mood  o : \"A little depressed\"  o Affect  o : Constricted, no tears on the phone  o Thought Content  o : negative suicidal ideations, negative homicidal ideations, negative obsessions  o Perceptions  o : negative auditory hallucinations, negative visual hallucinations  o Thought Process  o : Slightly circumstantial  o Insight/Judgement  o : fair/fair  o Cognition  o : grossly intact today  o Attention  o : intact          Assessment  · Anxiety     300.02/F41.1  · Depression     296.31       Presentation most consistent with anxiety disorder unspecified, depressive disorder unspecified.  Patient is not able to provide a compelling history for bipolar disorder, either 1 or 2.    Stable.  Taking hydroxyzine for insomnia.  Some benefit.    Patient does not present with any acute safety concerns.  Denies SI, plan, or intent.    Continue medications as prescribed.    Return in 2 weeks. F/U on referral for psychotherapy and case management.       Plan  · Orders  o ACO-39: Current medications updated and reviewed (1159F, ) - - 12/23/2020  · Medications  o clonazepam 1 mg oral tablet   SIG: take 1 tablet by oral route three times a day as needed   DISP: (90) Tablet with 3 " "refills  Refilled on 12/31/2020     o Medications have been Reconciled  o Transition of Care or Provider Policy  · Instructions  o Consults: consider neurology for further management of patient's \"memory issues.\"  o Risk Assessment: Risk of self-harm acutely is moderate to high. Risk factors include previous suicide attempt, recent SI, possible access to weapons, some AODA, depressive disorder, anxiety disorder. Protective factors include no present SI, no family history of suicide attempts, healthcare seeking, future orientation in that she is preparing for surgery in 2 days, willingness to engage in care. Risk of self-harm chronically is also moderate to high, but could be further elevated in the event of treatment noncompliance and/or AODA.  o Safety: No acute safety concerns.  o Medications: CONTINUE hydroxyzine 50 mg PO QHS for insomnia. Risks, benefits, side effects discussed with patient including GI upset, sedation, dizziness, grogginess the following day, prolongation of the QTc interval. After discussion of these risks and benefits, the patient voiced understanding and agreed to proceed. Continue duloxetine 120 mg p.o. daily, nortriptyline 150 mg p.o. daily, Klonopin 1 mg p.o. 3 times daily. Patient signed controlled substances education agreement today. Nitin report ordered, it is pending due to manual preparation. Consider switching the patient from duloxetine to a different medication. Patient reports she has been on nortriptyline for 30 years. It will be very difficult to titrate the patient down on Klonopin as she is also been on this chronically.  o Therapy: 22 minutes of therapy. Status post referral made for both psychotherapy and possible case management, which I understand [facility] can do.  o Labs/Studies: Follow up on UDS.  o Follow Up: 2 weeks.  · Disposition  o Follow Up in 2 weeks.            Electronically Signed by: Vincenzo Shay MD -Author on December 23, 2020 " 01:40:32 PM

## 2021-05-14 NOTE — PROGRESS NOTES
Progress Note      Patient Name: Breanna Smith   Patient ID: [id number]   Sex: Female   YOB: 1960    Primary Care Provider: Ligia KUNZ   Referring Provider: Will Jaeger MD    Visit Date: January 28, 2021    Provider: Vincenzo Shay MD   Location: [facility]   Location Address: [address] [place] [address]  [address] [place] [address]  [id number]   Location Phone: [phone]          Chief Complaint                  History Of Present Illness  Breanna Smith is a 60 year old /White female who presents to the office today referred by Will Jaeger MD.   Chart: 59F, hx of right hydronephrosis/bilateral ureter stents/right ureter catheter and bipolar disorder. On clonazepam 1 mg tid, duloxetine 120 mg daily, nortriptyline 150 daily. 9/2020: Cr 1.64, Hct 27.6 low, K 3.4 low. No EKG, head imaging. MARLENI-7 score November 2 is 19, severe. PHQ 9 score is 24, severe.   Audio visit via telephone for 23 minutes as patient cannot figure out Zoom. Patient is accepting of and agreeable to appointment. The appointment consisted of the patient and I only. Interview: No major changes. Patient reports that Verna, her sons ex girlfriend, has brought in her son and his significant other, and they own two snakes. Patient is afraid of snakes. Having increased anxiety in, and wanted to make sure that she had refills on her Klonopin. She is hoping that [place] will move out soon. Patient's mom has turned her phone off again, so she is not able to speak with her. Patient never received psychotherapy paperwork; this is the second time I have sent it. I suggested that she come by the office and pick it up herself, and she agreed. No SI HI AVH.   ...   ...   ...       Past Medical History  Disease Name Date Onset Notes   Allergies --  --    Anxiety --  --    Arthritis --  --    Bipolar 1 disorder --  --    Bipolar affective disorder --  --    Cataract --  --    Chronic pain --  --    COPD --   --    Depression --  --    Forgetfulness --  --    Gall Stones --  --    Heart Attack --  --    Heart Murmur --  --    Hemorrhoids --  --    Hernia --  --    High blood pressure --  --    High cholesterol --  --    Hydronephrosis --  --    Interstitial cystitis --  --    Kidney stone --  --    Migraine --  --    Psychiatric disorder --  --    Reflux --  --    Seizure --  --    Sinus trouble --  --          Past Surgical History  Procedure Name Date Notes   Caesarian section --  --    Gallbladder removal --  --    Hernia Repair --  --    Interstem Placement --  --    Nephrostomy With Tube Drainage --  --    Urostomy --  --          Medication List  Name Date Started Instructions   aspirin 81 mg oral tablet,delayed release (DR/EC) 01/27/2021 take 1 tablet (81 mg) by oral route once daily for 30 days   cetirizine 10 mg oral tablet 12/29/2020 take 1 tablet by oral route once a day (at bedtime) for 30 days   clonazepam 1 mg oral tablet 12/31/2020 take 1 tablet by oral route three times a day as needed   Combivent Respimat  mcg/actuation inhalation mist 08/21/2020 inhale 1 puff by inhalation route 4 times per day ; may take additional puffs as needed not to exceed 6 puffs in 24hrs for 30 days   Daily Multi-Vitamin oral tablet  take 1 tablet by oral route daily   duloxetine 60 mg oral capsule,delayed release(DR/EC) 12/07/2020 take 2 capsules (120 mg) by oral route once daily for 90 days   fenofibrate 160 mg oral tablet 01/15/2021 take 1 tablet (160 mg) by oral route once daily for 30 days   ferrous sulfate 325 mg (65 mg iron) oral tablet 09/15/2020 take 1 tablet (325 mg) by oral route once daily for 30 days   fluticasone propionate 50 mcg/actuation nasal spray,suspension 08/20/2020 inhale 1 puff by nasal route 2 times a day for 30 days   hydroxyzine HCl 50 mg oral tablet 01/14/2021 take 1 tablet by oral route once a day (at bedtime) for 90 days   nortriptyline 75 mg oral capsule 12/07/2020 take 2 capsules (150 mg) by  oral route once daily for 90 days   omeprazole 20 mg oral capsule,delayed release(/EC) 08/20/2020 take 1 capsule (20 mg) by oral route once daily 30 minutes to 1 hour before a meal for 30 days   oxycodone-acetaminophen 2.5-300 mg oral tablet  take 1 tablet by oral route every 6 hours as needed for pain   wheelchair with leg rest 0 09/03/2020 dx: vertigo R42, unsteady gait R26.81, frequent falls R29.6         Allergy List  Allergen Name Date Reaction Notes   Aleve --  --  --    Biaxin --  --  --    Cipro --  --  --    clindamycin HCl --  --  --    Flagyl --  --  --    gemfibrozil --  --  --    Keflex --  --  --    Latuda --  --  --    PENICILLINS --  --  --    pravastatin --  myalgia --    QUINOLONES --  --  --    SULFA (SULFONAMIDES) --  --  --    TETRACYCLINES --  --  --    vancomycin --  --  --          Family Medical History  Disease Name Relative/Age Notes   Family history of lung cancer  --    Family history of stroke Father/   --    Family history of heart disease Father/  Mother/   --    Family history of diabetes mellitus  --    Family history of stomach cancer Father/   --          Social History  Finding Status Start/Stop Quantity Notes   Tobacco Current every day --/-- 1.5 ppd 01/14/2021 -          Review of Systems  · Constitutional  o Admits  o : fatigue  o Denies  o : night sweats  · Eyes  o Admits  o : double vision, blurred vision, changes in vision  · HENT  o Admits  o : vertigo  o Denies  o : recent head injury  · Breasts  o Denies  o : abnormal changes in breast size, additional breast symptoms except as noted in the HPI  · Cardiovascular  o Denies  o : chest pain, irregular heart beats  · Respiratory  o Denies  o : shortness of breath, productive cough  · Gastrointestinal  o Denies  o : nausea, vomiting  · Genitourinary  o Denies  o : dysuria, urinary retention  · Integument  o Admits  o : new skin lesions  o Denies  o : hair growth change  · Neurologic  o Denies  o : altered mental status,  "seizures  · Musculoskeletal  o Admits  o : joint swelling, limitation of motion  · Endocrine  o Denies  o : cold intolerance, heat intolerance  · Psychiatric  o Admits  o : depression  · Heme-Lymph  o Denies  o : petechiae, lymph node enlargement or tenderness  · Allergic-Immunologic  o Denies  o : frequent illnesses      Physical Examination  · Mental Status Exam  o Appearance  o : Cannot assess  o Behavior  o : pleasant and cooperative  o Motor  o : Cannot assess  o Speech  o : Some latency, otherwise normal rhythm, rate, volume, tone, not hyperverbal, not pressured, normal prosidy  o Mood  o : \"Still depressed\"  o Affect  o : Constricted, slightly depressed on the phone, no tears  o Thought Content  o : negative suicidal ideations, negative homicidal ideations, negative obsessions  o Perceptions  o : negative auditory hallucinations, negative visual hallucinations  o Thought Process  o : Slightly circumstantial  o Insight/Judgement  o : fair/fair  o Cognition  o : grossly intact today  o Attention  o : intact          Assessment  · Anxiety     300.02/F41.1  · Depression     296.31       Presentation most consistent with anxiety disorder unspecified, depressive disorder unspecified.  Patient is not able to provide a compelling history for bipolar disorder, either 1 or 2.    Stable.  Continue medications.  Refilled hydroxyzine. Consider switching off of duloxetine; but I strongly suspect the patient's issues largely stem from personality.  For this reason, I think psychotherapy would be invaluable for the patient.    Referral for psychotherapy and case management, patient to  at the office.    Patient does not present with any acute safety concerns.  Denies SI, plan, or intent.    Return in 4 weeks.           Plan  · Orders  o PSYCHOTHERAPY CONSULTATION (PSYTH) - 300.02/F41.1, 296.31 - 01/28/2021   psychotherapy and case management  o ACO-39: Current medications updated and reviewed (1159F, ) - - " "01/28/2021  · Medications  o Medications have been Reconciled  o Transition of Care or Provider Policy  · Instructions  o Consults: consider neurology for further management of patient's \"memory issues.\"  o Risk Assessment: Risk of self-harm acutely is moderate to high. Risk factors include previous suicide attempt, recent SI, possible access to weapons, some AODA, depressive disorder, anxiety disorder. Protective factors include no present SI, no family history of suicide attempts, healthcare seeking, future orientation in that she is preparing for surgery in 2 days, willingness to engage in care. Risk of self-harm chronically is also moderate to high, but could be further elevated in the event of treatment noncompliance and/or AODA.  o Safety: No acute safety concerns.  o Medications: CONTINUE hydroxyzine 50 mg PO QHS for insomnia. Risks, benefits, side effects discussed with patient including GI upset, sedation, dizziness, grogginess the following day, prolongation of the QTc interval. After discussion of these risks and benefits, the patient voiced understanding and agreed to proceed. CONTINUE duloxetine 120 mg p.o. daily, nortriptyline 150 mg p.o. daily, Klonopin 1 mg p.o. 3 times daily. Patient signed controlled substances education agreement today. Nitin report ordered, it is pending due to manual preparation. Consider switching the patient from duloxetine to a different medication. Patient reports she has been on nortriptyline for 30 years. It will be very difficult to titrate the patient down on Klonopin as she is also been on this chronically.  o Therapy: 21 minutes of therapy. Patient never received psychotherapy paperwork. Will  at office.  o Labs/Studies: Follow up on UDS.  o Follow Up: 4 weeks.  · Disposition  o Follow Up in 1 month.            Electronically Signed by: Vincenzo Shay MD -Author on January 28, 2021 11:27:33 AM  "

## 2021-05-14 NOTE — PROGRESS NOTES
Progress Note      Patient Name: Breanna Smith   Patient ID: [id number]   Sex: Female   YOB: 1960    Primary Care Provider: Ligia KUNZ   Referring Provider: Will Jaeger MD    Visit Date: February 2, 2021    Provider: Will Jaeger MD   Location: [facility]   Location Address: [address] [place] [address], [address] [place] [address]  [id number]   Location Phone: [phone]          Chief Complaint     2 month f/u on Anemia, Anxiety, Depression       History Of Present Illness  Breanna Smith is a 60 year old /White female who presents for evaluation and treatment of:      60 years old female with multiple medical conditions such as CKD, hydronephrosis, ileal conduit, s/p ureteral stent, iron deficiency anemia, anxiety/depression, chronic pain, multiple abdominal hernia comes to the clinic today to follow-up on chronic conditions.    Patient is following up with urology/surgery specialist; procedure is scheduled for tomorrow.    Iron deficiency anemia; continue with iron; repeat CBC in 3 months.    Abdominal hernia; not a good candidate for surgery.    Patient denies any other acute complaints at this time       Past Medical History  Disease Name Date Onset Notes   Allergies --  --    Anxiety --  --    Arthritis --  --    Bipolar 1 disorder --  --    Bipolar affective disorder --  --    Cataract --  --    Chronic pain --  --    COPD --  --    Depression --  --    Forgetfulness --  --    Gall Stones --  --    Heart Attack --  --    Heart Murmur --  --    Hemorrhoids --  --    Hernia --  --    High blood pressure --  --    High cholesterol --  --    Hydronephrosis --  --    Interstitial cystitis --  --    Kidney stone --  --    Migraine --  --    Psychiatric disorder --  --    Reflux --  --    Seizure --  --    Sinus trouble --  --          Past Surgical History  Procedure Name Date Notes   Caesarian section --  --    Gallbladder removal --  --    Hernia  Repair --  --    Interstem Placement --  --    Nephrostomy With Tube Drainage --  --    Urostomy --  --          Medication List  Name Date Started Instructions   aspirin 81 mg oral tablet,delayed release (DR/EC) 01/27/2021 take 1 tablet (81 mg) by oral route once daily for 30 days   cetirizine 10 mg oral tablet 12/29/2020 take 1 tablet by oral route once a day (at bedtime) for 30 days   clonazepam 1 mg oral tablet 12/31/2020 take 1 tablet by oral route three times a day as needed   Combivent Respimat  mcg/actuation inhalation mist 08/21/2020 inhale 1 puff by inhalation route 4 times per day ; may take additional puffs as needed not to exceed 6 puffs in 24hrs for 30 days   Daily Multi-Vitamin oral tablet  take 1 tablet by oral route daily   duloxetine 60 mg oral capsule,delayed release(DR/EC) 12/07/2020 take 2 capsules (120 mg) by oral route once daily for 90 days   fenofibrate 160 mg oral tablet 01/29/2021 take 1 tablet (160 mg) by oral route once daily for 30 days   ferrous sulfate 325 mg (65 mg iron) oral tablet 02/02/2021 take 1 tablet (325 mg) by oral route once daily for 30 days   fluticasone propionate 50 mcg/actuation nasal spray,suspension 08/20/2020 inhale 1 puff by nasal route 2 times a day for 30 days   hydroxyzine HCl 50 mg oral tablet 01/14/2021 take 1 tablet by oral route once a day (at bedtime) for 90 days   levofloxacin 250 mg oral tablet 01/29/2021 take 1 tablet (250 mg) by oral route once daily for 3 days leading up to surgery.   nortriptyline 75 mg oral capsule 12/07/2020 take 2 capsules (150 mg) by oral route once daily for 90 days   omeprazole 20 mg oral capsule,delayed release(DR/EC) 08/20/2020 take 1 capsule (20 mg) by oral route once daily 30 minutes to 1 hour before a meal for 30 days   oxycodone-acetaminophen 2.5-300 mg oral tablet  take 1 tablet by oral route every 6 hours as needed for pain   wheelchair with leg rest 0 09/03/2020 dx: vertigo R42, unsteady gait R26.81, frequent  "falls R29.6         Allergy List  Allergen Name Date Reaction Notes   Aleve --  --  --    Biaxin --  --  --    Cipro --  --  --    clindamycin HCl --  --  --    Flagyl --  --  --    gemfibrozil --  --  --    Keflex --  --  --    Latuda --  --  --    PENICILLINS --  --  --    pravastatin --  myalgia --    QUINOLONES --  --  --    SULFA (SULFONAMIDES) --  --  --    TETRACYCLINES --  --  --    vancomycin --  --  --          Family Medical History  Disease Name Relative/Age Notes   Family history of lung cancer  --    Family history of stroke Father/   --    Family history of heart disease Father/  Mother/   --    Family history of diabetes mellitus  --    Family history of stomach cancer Father/   --          Social History  Finding Status Start/Stop Quantity Notes   Tobacco Current every day --/-- 1.5 ppd 01/14/2021 -          Review of Systems  · Constitutional  o Denies  o : fatigue, fever  · Eyes  o Denies  o : discharge from eye, redness  · HENT  o Denies  o : headaches, congestion  · Cardiovascular  o Denies  o : chest pain, palpitations  · Respiratory  o Denies  o : shortness of breath, wheezing, cough  · Gastrointestinal  o Admits  o : On and off abdominal pain  o Denies  o : vomiting, diarrhea, constipation  · Genitourinary  o Denies  o : dysuria, hematuria  · Integument  o Denies  o : rash, new skin lesions  · Neurologic  o Denies  o : altered mental status, seizures  · Musculoskeletal  o Denies  o : weakness, joint swelling  · Psychiatric  o Denies  o : anxiety, depression  · Heme-Lymph  o Denies  o : lymph node enlargement, tenderness      Vitals  Date Time BP Position Site L\R Cuff Size HR RR TEMP (F) WT  HT  BMI kg/m2 BSA m2 O2 Sat FR L/min FiO2 HC       02/02/2021 09:42 /50 Sitting    82 - R 16 96 117lbs 5oz 5'  4\" 20.14 1.55 96 %  21%          Physical Examination  · Constitutional  o Appearance  o : alert, in no acute distress, well developed, well-nourished  · Head and Face  o Head  o : " normocephalic, atraumatic, non tender, no palpable masses or nodules.  o Face  o : no facial lesions  · Eyes  o Vision  o : Acuity: grossly normal at distance, Conjuntivae: Normal, Sclerae white, Pupils: PERRL, Cornea: Clear, no lesions bilateral  · Neck  o Inspection/Palpation  o : Supple, no masses or tenderness, no deformities, Trachea: Midline, ROM: with in normal limits, no neck stiffness  o Thyroid  o : no thyomegaly, no palpabale masses   · Respiratory  o Auscultation of Lungs  o : normal breath sounds throughout  · Cardiovascular  o Heart  o : Regular rate and rhythm, Normal S1,S2 , No cardiac murmers, No S3 or S4 gallop or rubs  · Neurologic  o Mental Status Examination  o : alert and oriented to time, place, and person., Cranial Nerves: grossly intact,   · Psychiatric  o Mood and Affect  o : normal mood and affect          Assessment  · Anxiety disorder     300.00/F41.9  -----Somewhat controlled, continue with psych management; notes reviewed  · Depression     311/F32.9  · Nicotine dependence     305.1/F17.200  · DANGELO (iron deficiency anemia)     280.9/D50.9  · Hydronephrosis     591/N13.30  · CKD (chronic kidney disease)     585.9/N18.9  · S/P ureteral stent placement     V45.89/Z96.0  · Chronic pain     338.29/G89.29  ----Uncontrolled, continue with pain management recommendations  · Abdominal hernia     553.9/K46.9  ----Not a good candidate for surgical intervention as per urology/specialists     -------- patient is following up with urology; patient has procedure scheduled for next week  ------- continue with iron, CBC discussed with patient in great detail; hemoglobin below 8.  Continue with iron, GI referral offered; declined at this time  ------ continue with urology and nephrology       Plan  · Orders  o Smoking cessation counseling, 3-10 minutes [facility] ([address]) - 305.1/F17.200 - 02/02/2021  o ACO-17: Screened for tobacco use AND received tobacco cessation intervention (6224F) - 305.1/F17.200 -  "02/02/2021  o ACO-17: Screened for tobacco use AND received tobacco cessation intervention (4004F) - - 02/02/2021  o ACO-14: Influenza immunization administered or previously received [facility] () - - 02/02/2021  o ACO-39: Current medications updated and reviewed (1159F, ) - - 02/02/2021  · Medications  o omeprazole 20 mg oral capsule,delayed release(DR/EC)   SIG: take 1 capsule (20 mg) by oral route once daily 30 minutes to 1 hour before a meal for 30 days   DISP: (30) Capsule with 3 refills  Refilled on 02/02/2021     o Medications have been Reconciled  o Transition of Care or Provider Policy  · Instructions  o Discussed the need for therapy, either with a certified counselor, psychologist, and/or family . If no improvement is noted or worsening of their condition, return to office or ER. But also discussed with patient that if they are non-responsive to the type of medication they may need to see a psychiatrist for further evaluation and management.  o Patient was given an SSRI/SSNRI medication and warned of possible side effects of the medication including potential for increased risk of suicidal thoughts and feelings. Patient was instructed that if they begin to exhibit any of these effects they will discontinue the medication immediately and contact our office or the ER ASAP.  o Patient agrees to a \"No Self Harm\" contract. Patient will either call , [facility], ER, [facility], [facility].  o Discussed the need for therapy, either with a certified counselor, psychologist, and/or family . If no improvement is noted or worsening of their condition, return to office or ER. But also discussed with patient that if they are non-responsive to the type of medication they may need to see a psychiatrist for further evaluation and management.  o Patient was given an SSRI/SSNRI medication and warned of possible side effects of the medication including potential for increased " "risk of suicidal thoughts and feelings. Patient was instructed that if they begin to exhibit any of these effects they will discontinue the medication immediately and contact our office or the ER ASAP.  o Patient agrees to a \"No Self Harm\" contract. Patient will either call us, 1, ER, [facility], [facility].  o *Form of nicotine being used:   o Patient was strongly encouraged to discontinue use of any nicotine containing product or minimize the use of the product.  o Discussed smoking cessation and counseling with patient for over 3 minutes.  o Patient was educated/instructed on their diagnosis, treatment and medications prior to discharge from the clinic today.  o Patient instructed to seek medical attention urgently for new or worsening symptoms.  o Call the office with any concerns or questions.  o Minutes spent with patient including greater than 50% in Education/Counseling/Care Coordination.  o Time spent with the patient was minutes, more than 50% face to face.  o Discussed Covid-19 precautions including, but not limited to, social distancing, avoid touching your face, and hand washing.   · Disposition  o Call or Return if symptoms worsen or persist.  o Follow Up in 3 months.            Electronically Signed by: Wlil Jaeger MD -Author on February 2, 2021 12:54:39 PM  "

## 2021-05-14 NOTE — PROGRESS NOTES
"   Progress Note      Patient Name: Breanna Smith   Patient ID: [id number]   Sex: Female   YOB: 1960    Primary Care Provider: Ligia KUNZ   Referring Provider: Will Jaeger MD    Visit Date: March 25, 2021    Provider: Vincenzo Shay MD   Location: [facility]   Location Address: [address] [place] [address]  [address] [place] [address]  [id number]   Location Phone: [phone]          Chief Complaint     \"I do not want to change my medications, it is the situation I am in.\"             History Of Present Illness  Breanna Smith is a 60 year old /White female who presents to the office today referred by Will Jaeegr MD.   Chart: 59F, hx of right hydronephrosis/bilateral ureter stents/right ureter catheter and bipolar disorder. On clonazepam 1 mg tid, duloxetine 120 mg daily, nortriptyline 150 daily. 9/2020: Cr 1.64, Hct 27.6 low, K 3.4 low. No EKG, head imaging. MARLENI-7 score November 2 is 19, severe. PHQ 9 score is 24, severe.   Audio visit via telephone for 23 minutes as patient cannot figure out Zoom. Patient is accepting of and agreeable to appointment. The appointment consisted of the patient and I only. Interview: Again, no major changes. Patient reports she got into an argument with her son's daughter yesterday, which ultimately led to a cussing match. Patient declines any medication changes or management, stating that \"my last psychiatrist tried everything, and nothing worked.\" States the only medication that works for her is duloxetine. Feels that her situation is the problem. No SI HI AVH.   Even while her situation is the source of her frustrations and stress, the patient has not made any attempts to find another housing alternative. She notes about $500 to 600 a month after payments. Today, I emailed her the therapy referral, as well as a list of therapy clinics, recommending [facility] because they do both therapy and case " management, the latter of which would be very helpful for the patient. She voiced understanding and agreed to proceed.   ...   ...       Past Medical History  Disease Name Date Onset Notes   Allergies --  --    Anxiety --  --    Arthritis --  --    Bipolar 1 disorder --  --    Bipolar affective disorder --  --    Cataract --  --    Chronic pain --  --    COPD --  --    Depression --  --    Forgetfulness --  --    Gall Stones --  --    Heart Attack --  --    Heart Murmur --  --    Hemorrhoids --  --    Hernia --  --    High blood pressure --  --    High cholesterol --  --    Hydronephrosis --  --    Interstitial cystitis --  --    Kidney stone --  --    Migraine --  --    Psychiatric disorder --  --    Reflux --  --    Seizure --  --    Sinus trouble --  --          Past Surgical History  Procedure Name Date Notes   Caesarian section --  --    Gallbladder removal --  --    Hernia Repair --  --    Interstem Placement --  --    Nephrostomy With Tube Drainage --  --    Urostomy --  --          Medication List  Name Date Started Instructions   aspirin 81 mg oral tablet,delayed release (DR/EC) 01/27/2021 take 1 tablet (81 mg) by oral route once daily for 30 days   cetirizine 10 mg oral tablet 12/29/2020 take 1 tablet by oral route once a day (at bedtime) for 30 days   clonazepam 1 mg oral tablet 12/31/2020 take 1 tablet by oral route three times a day as needed   Combivent Respimat  mcg/actuation inhalation mist 08/21/2020 inhale 1 puff by inhalation route 4 times per day ; may take additional puffs as needed not to exceed 6 puffs in 24hrs for 30 days   Daily Multi-Vitamin oral tablet  take 1 tablet by oral route daily   duloxetine 60 mg oral capsule,delayed release(DR/EC) 12/07/2020 take 2 capsules (120 mg) by oral route once daily for 90 days   fenofibrate 160 mg oral tablet 01/29/2021 take 1 tablet (160 mg) by oral route once daily for 30 days   ferrous sulfate 325 mg (65 mg iron) oral tablet 02/02/2021 take 1  tablet (325 mg) by oral route once daily for 30 days   fluticasone propionate 50 mcg/actuation nasal spray,suspension 08/20/2020 inhale 1 puff by nasal route 2 times a day for 30 days   hydroxyzine HCl 50 mg oral tablet 01/14/2021 take 1 tablet by oral route once a day (at bedtime) for 90 days   nortriptyline 75 mg oral capsule 12/07/2020 take 2 capsules (150 mg) by oral route once daily for 90 days   omeprazole 20 mg oral capsule,delayed release(DR/EC) 02/02/2021 take 1 capsule (20 mg) by oral route once daily 30 minutes to 1 hour before a meal for 30 days   wheelchair with leg rest 0 09/03/2020 dx: vertigo R42, unsteady gait R26.81, frequent falls R29.6         Allergy List  Allergen Name Date Reaction Notes   Aleve --  --  --    Biaxin --  --  --    Cipro --  --  --    clindamycin HCl --  --  --    Flagyl --  --  --    gemfibrozil --  --  --    Keflex --  --  --    Latuda --  --  --    oxycodone-acetaminophen Mar 25 2021 12:00AM hives --    PENICILLINS --  --  --    pravastatin --  myalgia --    QUINOLONES --  --  --    SULFA (SULFONAMIDES) --  --  --    TETRACYCLINES --  --  --    vancomycin --  --  --          Family Medical History  Disease Name Relative/Age Notes   Family history of lung cancer  --    Family history of stroke Father/   --    Family history of heart disease Father/  Mother/   --    Family history of diabetes mellitus  --    Family history of stomach cancer Father/   --          Social History  Finding Status Start/Stop Quantity Notes   Tobacco Current every day --/-- 1.5 ppd 01/14/2021 -          Review of Systems  · Constitutional  o Denies  o : fatigue, night sweats  · Eyes  o Admits  o : blurred vision  o Denies  o : double vision  · HENT  o Denies  o : vertigo, recent head injury  · Breasts  o Denies  o : abnormal changes in breast size, additional breast symptoms except as noted in the HPI  · Cardiovascular  o Denies  o : chest pain, irregular heart beats  · Respiratory  o Admits  o :  "shortness of breath, productive cough  · Gastrointestinal  o Denies  o : nausea, vomiting  · Genitourinary  o Denies  o : dysuria, urinary retention  · Integument  o Admits  o : rash, new skin lesions  o Denies  o : hair growth change  · Neurologic  o Admits  o : altered mental status  o Denies  o : seizures  · Musculoskeletal  o Denies  o : joint swelling, limitation of motion  · Endocrine  o Denies  o : cold intolerance, heat intolerance  · Heme-Lymph  o Admits  o : petechiae  o Denies  o : lymph node enlargement or tenderness  · Allergic-Immunologic  o Denies  o : frequent illnesses      Physical Examination  · Mental Status Exam  o Appearance  o : Cannot assess  o Behavior  o : pleasant and cooperative  o Motor  o : Cannot assess  o Speech  o : Some latency, otherwise normal rhythm, rate, volume, tone, not hyperverbal, not pressured, normal prosidy  o Mood  o : \"Still depressed\"  o Affect  o : Constricted, slightly depressed on the phone, no tears  o Thought Content  o : negative suicidal ideations, negative homicidal ideations, negative obsessions  o Perceptions  o : negative auditory hallucinations, negative visual hallucinations  o Thought Process  o : Slightly circumstantial  o Insight/Judgement  o : fair/fair  o Cognition  o : grossly intact today  o Attention  o : intact          Assessment  · Anxiety     300.02/F41.1  · Depression     296.31       Presentation most consistent with anxiety disorder unspecified, depressive disorder unspecified.  Patient is not able to provide a compelling history for bipolar disorder, either 1 or 2.    Stable.  Continue medications.  Patient declines any medication changes.  I emailed the patient the therapy referral order as well as a list of therapy clinics for her to begin the process of setting up therapy and case management.    Patient does not present with any acute safety concerns.  Denies SI, plan, or intent.    Return in 2 months.     " "      Plan  · Orders  o PSYCHOTHERAPY CONSULTATION (PSYTH) - 300.02/F41.1, 296.31 - 03/18/2021   psychotherapy and case management  o ACO-39: Current medications updated and reviewed (1159F, ) - - 03/18/2021  · Medications  o Medications have been Reconciled  o Transition of Care or Provider Policy  · Instructions  o Consults: consider neurology for further management of patient's \"memory issues.\"  o Risk Assessment: Risk of self-harm acutely is moderate to high. Risk factors include previous suicide attempt, recent SI, possible access to weapons, some AODA, depressive disorder, anxiety disorder. Protective factors include no present SI, no family history of suicide attempts, healthcare seeking, future orientation in that she is preparing for surgery in 2 days, willingness to engage in care. Risk of self-harm chronically is also moderate to high, but could be further elevated in the event of treatment noncompliance and/or AODA.  o Safety: No acute safety concerns.  o Medications: CONTINUE hydroxyzine 50 mg PO QHS for insomnia. Risks, benefits, side effects discussed with patient including GI upset, sedation, dizziness, grogginess the following day, prolongation of the QTc interval. After discussion of these risks and benefits, the patient voiced understanding and agreed to proceed. CONTINUE duloxetine 120 mg p.o. daily, nortriptyline 150 mg p.o. daily, Klonopin 1 mg p.o. 3 times daily. Patient signed controlled substances education agreement today. Nitin report ordered, it is pending due to manual preparation. Consider switching the patient from duloxetine to a different medication. Patient reports she has been on nortriptyline for 30 years. It will be very difficult to titrate the patient down on Klonopin as she is also been on this chronically.  o Therapy: 23 minutes of therapy. Emailed information to patient.  o Labs/Studies: UDS completely neg in 8/2020, which is odd because she is on clonazepam.  o Follow " Up: 2 mos.  · Disposition  o Follow Up in 2 months.            Electronically Signed by: Vincenzo Shay MD -Author on March 25, 2021 08:37:04 AM

## 2021-05-14 NOTE — PROGRESS NOTES
"   Progress Note      Patient Name: Breanna Smith   Patient ID: [id number]   Sex: Female   YOB: 1960    Primary Care Provider: Ligia KUNZ   Referring Provider: Will Jaeger MD    Visit Date: January 14, 2021    Provider: Vincenzo Shay MD   Location: [facility]   Location Address: [address] [place] [address]  [address] [place] [address]  [id number]   Location Phone: [phone]          Chief Complaint     \"I cannot stand living here.\"             History Of Present Illness  Breanna Smith is a 60 year old /White female who presents to the office today referred by Will Jaeger MD.   Chart: 59F, hx of right hydronephrosis/bilateral ureter stents/right ureter catheter and bipolar disorder. On clonazepam 1 mg tid, duloxetine 120 mg daily, nortriptyline 150 daily. 9/2020: Cr 1.64, Hct 27.6 low, K 3.4 low. No EKG, head imaging. MARLENI-7 score November 2 is 19, severe. PHQ 9 score is 24, severe.   Audio visit via telephone for 25 minutes as patient cannot figure out Zoom. Patient is accepting of and agreeable to appointment. The appointment consisted of the patient and I only. Interview: Patient reports her \"son keeps jumping on me.\" Patient has to borrow some money from him until she received her check and he told her no. He also told her he cannot wait until he gets his taxes so he can take her back to [place]. On the plus side, patient's mom now has a phone, so at least they can talk to each other every now and then. Patient reports the only person she talks to in the house that she lives in right now is Kaitlynn, who was nice to her. None of the rest of the 8 individuals she lives with are very nice to her. Patient would like to leave, but she is not sure what to do. Patient needs a refill on her hydroxyzine. No SI HI AVH.   ...   ...   ...       Past Medical History  Disease Name Date Onset Notes   Allergies --  --    Anxiety --  --    Arthritis --  --    Bipolar 1 " disorder --  --    Bipolar affective disorder --  --    Cataract --  --    Chronic pain --  --    COPD --  --    Depression --  --    Forgetfulness --  --    Gall Stones --  --    Heart Attack --  --    Heart Murmur --  --    Hemorrhoids --  --    Hernia --  --    High blood pressure --  --    High cholesterol --  --    Hydronephrosis --  --    Interstitial cystitis --  --    Kidney stone --  --    Migraine --  --    Psychiatric disorder --  --    Reflux --  --    Seizure --  --    Sinus trouble --  --          Past Surgical History  Procedure Name Date Notes   Caesarian section --  --    Gallbladder removal --  --    Hernia Repair --  --    Interstem Placement --  --    Nephrostomy With Tube Drainage --  --    Urostomy --  --          Medication List  Name Date Started Instructions   aspirin 81 mg oral tablet,delayed release (DR/EC) 08/20/2020 take 1 tablet (81 mg) by oral route once daily for 30 days   cetirizine 10 mg oral tablet 12/29/2020 take 1 tablet by oral route once a day (at bedtime) for 30 days   clonazepam 1 mg oral tablet 12/31/2020 take 1 tablet by oral route three times a day as needed   Combivent Respimat  mcg/actuation inhalation mist 08/21/2020 inhale 1 puff by inhalation route 4 times per day ; may take additional puffs as needed not to exceed 6 puffs in 24hrs for 30 days   Daily Multi-Vitamin oral tablet  take 1 tablet by oral route daily   duloxetine 60 mg oral capsule,delayed release(DR/EC) 12/07/2020 take 2 capsules (120 mg) by oral route once daily for 90 days   fenofibrate 160 mg oral tablet 08/20/2020 take 1 tablet (160 mg) by oral route once daily for 30 days   ferrous sulfate 325 mg (65 mg iron) oral tablet 09/15/2020 take 1 tablet (325 mg) by oral route once daily for 30 days   fluticasone propionate 50 mcg/actuation nasal spray,suspension 08/20/2020 inhale 1 puff by nasal route 2 times a day for 30 days   hydrocodone-acetaminophen 7.5-325 mg oral tablet  take 1 tablet by oral  route every 8 hours as needed   hydroxyzine HCl 50 mg oral tablet 01/14/2021 take 1 tablet by oral route once a day (at bedtime) for 90 days   nortriptyline 75 mg oral capsule 12/07/2020 take 2 capsules (150 mg) by oral route once daily for 90 days   omeprazole 20 mg oral capsule,delayed release(DR/EC) 08/20/2020 take 1 capsule (20 mg) by oral route once daily 30 minutes to 1 hour before a meal for 30 days   wheelchair with leg rest 0 09/03/2020 dx: vertigo R42, unsteady gait R26.81, frequent falls R29.6         Allergy List  Allergen Name Date Reaction Notes   Aleve --  --  --    Biaxin --  --  --    Cipro --  --  --    clindamycin HCl --  --  --    Flagyl --  --  --    gemfibrozil --  --  --    Keflex --  --  --    Latuda --  --  --    PENICILLINS --  --  --    pravastatin --  myalgia --    QUINOLONES --  --  --    SULFA (SULFONAMIDES) --  --  --    TETRACYCLINES --  --  --    vancomycin --  --  --        Allergies Reconciled  Family Medical History  Disease Name Relative/Age Notes   Family history of lung cancer  --    Family history of stroke Father/   --    Family history of heart disease Father/  Mother/   --    Family history of diabetes mellitus  --    Family history of stomach cancer Father/   --          Social History  Finding Status Start/Stop Quantity Notes   Tobacco Current every day --/-- 1.5 ppd 01/14/2021 -          Review of Systems  · Constitutional  o Admits  o : fatigue  o Denies  o : night sweats  · Eyes  o Admits  o : double vision, blurred vision  · HENT  o Admits  o : vertigo  o Denies  o : recent head injury  · Breasts  o Denies  o : abnormal changes in breast size, additional breast symptoms except as noted in the HPI  · Cardiovascular  o Denies  o : chest pain, irregular heart beats  · Respiratory  o Admits  o : productive cough  o Denies  o : shortness of breath  · Gastrointestinal  o Denies  o : nausea, vomiting  · Genitourinary  o Denies  o : dysuria, urinary  "retention  · Integument  o Admits  o : hair growth change  o Denies  o : new skin lesions  · Neurologic  o Admits  o : altered mental status  o Denies  o : seizures  · Musculoskeletal  o Admits  o : joint swelling, limitation of motion, back pain  · Endocrine  o Admits  o : cold intolerance  o Denies  o : heat intolerance  · Psychiatric  o Admits  o : anxiety, depression  · Heme-Lymph  o Denies  o : petechiae, lymph node enlargement or tenderness      Physical Examination  · Mental Status Exam  o Appearance  o : Cannot assess  o Behavior  o : pleasant and cooperative  o Motor  o : Cannot assess  o Speech  o : Some latency, otherwise normal rhythm, rate, volume, tone, not hyperverbal, not pressured, normal prosidy  o Mood  o : \"A little depressed\"  o Affect  o : Constricted, slightly depressed on the phone  o Thought Content  o : negative suicidal ideations, negative homicidal ideations, negative obsessions  o Perceptions  o : negative auditory hallucinations, negative visual hallucinations  o Thought Process  o : Slightly circumstantial  o Insight/Judgement  o : fair/fair  o Cognition  o : grossly intact today  o Attention  o : intact          Assessment  · Anxiety     300.02/F41.1  · Depression     296.31       Presentation most consistent with anxiety disorder unspecified, depressive disorder unspecified.  Patient is not able to provide a compelling history for bipolar disorder, either 1 or 2.    Stable.  Continue medications.  Refilled hydroxyzine. Consider switching off of duloxetine.    Patient does not present with any acute safety concerns.  Denies SI, plan, or intent.    Return in 2 weeks.    Repeat referral for psychotherapy and case management, as patient never received the first copy.       Plan  · Orders  o PSYCHOTHERAPY CONSULTATION (PSYTH) - 300.02/F41.1, 296.31 - 01/14/2021   psychotherapy and case management  o ACO-39: Current medications updated and reviewed (1159Y, ) - - " "01/14/2021  · Medications  o hydroxyzine HCl 50 mg oral tablet   SIG: take 1 tablet by oral route once a day (at bedtime) for 90 days   DISP: (90) Tablet with 1 refills  Refilled on 01/14/2021     o Medications have been Reconciled  o Transition of Care or Provider Policy  · Instructions  o Consults: consider neurology for further management of patient's \"memory issues.\"  o Risk Assessment: Risk of self-harm acutely is moderate to high. Risk factors include previous suicide attempt, recent SI, possible access to weapons, some AODA, depressive disorder, anxiety disorder. Protective factors include no present SI, no family history of suicide attempts, healthcare seeking, future orientation in that she is preparing for surgery in 2 days, willingness to engage in care. Risk of self-harm chronically is also moderate to high, but could be further elevated in the event of treatment noncompliance and/or AODA.  o Safety: No acute safety concerns.  o Medications: CONTINUE hydroxyzine 50 mg PO QHS for insomnia. Risks, benefits, side effects discussed with patient including GI upset, sedation, dizziness, grogginess the following day, prolongation of the QTc interval. After discussion of these risks and benefits, the patient voiced understanding and agreed to proceed. CONTINUE duloxetine 120 mg p.o. daily, nortriptyline 150 mg p.o. daily, Klonopin 1 mg p.o. 3 times daily. Patient signed controlled substances education agreement today. Nitin report ordered, it is pending due to manual preparation. Consider switching the patient from duloxetine to a different medication. Patient reports she has been on nortriptyline for 30 years. It will be very difficult to titrate the patient down on Klonopin as she is also been on this chronically.  o Therapy: 23 minutes of therapy. Patient never received psychotherapy paperwork. Resending.  o Labs/Studies: Follow up on UDS.  o Follow Up: 2 weeks.  · Disposition  o Follow Up in 2 " weeks.            Electronically Signed by: Vincenzo Shay MD -Author on January 14, 2021 10:59:55 AM

## 2021-05-14 NOTE — PROGRESS NOTES
Progress Note      Patient Name: Breanna Smith   Patient ID: [id number]   Sex: Female   YOB: 1960    Primary Care Provider: Ligia KUNZ   Referring Provider: Will Jaeger MD    Visit Date: January 5, 2021    Provider: Ridge Frye MD   Location: [facility] [place] [place] [facility]   Location Address: [address] [place] [address]  [id number]   Location Phone: [phone]          Chief Complaint  · pt here for urologic issues      History Of Present Illness     Telephone call    11 min    A Gypsy present.    60-year-old  female w h/o IC status post cystectomy many years ago with neobladder creation that was converted to ieal conduit.  Patient with right ureteral anastomotic stricture    11/4/20 ileoscopy, right single-J 6 Citizen of [place] ureteral stent placement.  Removal of right nephroureteral catheter    Pain Better with the nephU  out. Still complaining of lots of chronic pain.    She is having pain over her interstim. This has been chronic.  Very bothersome to the patient.    States her InterStim never really helped her.    h/o MI.  COPD    ASA 81    Previous    Dr. Le  -high risk for any further abdominal surgeries.      10/5/2020 right nephroureteral catheter placement    has bilateral 7 Citizen of [place] single J nephroureteral catheters.  The patient states that the last time they were changed was in April or May 2020.  She states that these were placed by her urologist in [place] related to concern for a fistula of some sort?.    compliance issues in the past.    1/20 CT abdomen/pelvis with and without nonobstructing left renal calculi, 4 mm largest.  Severe right-sided hydronephrosis may relate to stricture at the anastomotic site.  2 urostomy tubes terminate in the neobladder rather than ureters.  No hydronephrosis on the left.  Couple punctate nonobstructing calculi within the neobladder.    12/19 urologist in [place] looposcopy, internalization of bilateral  stents    11/19 creatinine 1.7,   GFR  31    Multiple surgeries for parastomal hernia.  Patient is been managed with chronic indwelling catheter drainage from her loop.      2001 InterStim placement - not using.  2001 neobladder converted to ileal conduit  1998 cystectomy with neobladder -  for IC    The patient also has a significant chronic health history including hypertension, recurrent urinary tract infections, chronic small bowel obstruction, anemia, multiple falls, and COPD.    history of severe malnutrition requiring long-term TPN previously.    Her son changes her urostomy bags.  The patient reports that she will need her nephroureteral catheters exchanged every 3 months.    history of urostomy stenosis.    She states she cannot tolerate any antibiotics aside from Levaquin and will not take anything aside from Levaquin for acute infections.    She currently uses [employer] products for her urostomy supplies as she states she can no longer use any other brand as they cause skin breakdown.     no family history of urologic cancers.     1-1/2 pack/day smoker x46 years    Patient has had several abdominal ventral hernia repairs.  She does have mesh.           Past Medical History  Allergies; Anxiety; Arthritis; Bipolar 1 disorder; Bipolar affective disorder; Cataract; Chronic pain; COPD; Depression; Forgetfulness; Gall Stones; Heart Attack; Heart Murmur; Hemorrhoids; Hernia; High blood pressure; High cholesterol; Hydronephrosis; Interstitial cystitis; Kidney stone; Migraine; Psychiatric disorder; Reflux; Seizure; Sinus trouble         Past Surgical History  Caesarian section; Gallbladder removal; Hernia Repair; Interstem Placement; Nephrostomy With Tube Drainage; Urostomy         Medication List  aspirin 81 mg oral tablet,delayed release (DR/EC); cetirizine 10 mg oral tablet; clonazepam 1 mg oral tablet; Combivent Respimat  mcg/actuation inhalation mist; Daily Multi-Vitamin oral tablet; duloxetine 60 mg  oral capsule,delayed release(DR/EC); fenofibrate 160 mg oral tablet; ferrous sulfate 325 mg (65 mg iron) oral tablet; fluticasone propionate 50 mcg/actuation nasal spray,suspension; hydrocodone-acetaminophen 7.5-325 mg oral tablet; hydroxyzine HCl 50 mg oral tablet; nortriptyline 75 mg oral capsule; omeprazole 20 mg oral capsule,delayed release(DR/EC); Vitamin D2 1,250 mcg (50,000 unit) oral capsule; wheelchair with leg rest 0         Allergy List  Aleve; Biaxin; Cipro; clindamycin HCl; Flagyl; gemfibrozil; Keflex; Latuda; PENICILLINS; pravastatin; QUINOLONES; SULFA (SULFONAMIDES); TETRACYCLINES; vancomycin       Allergies Reconciled  Family Medical History  Family history of lung cancer; Family history of stroke; Family history of heart disease; Family history of diabetes mellitus; Family history of stomach cancer         Social History  Tobacco (Current every day)         Review of Systems  · Constitutional  o Denies  o : chills, fever  · Gastrointestinal  o Denies  o : nausea, vomiting          Assessment  · Nephrostomy complication     997.5/N99.528  · Presence of urostomy     V44.6/Z93.6  · Hydronephrosis     591/N13.30      Plan  · Orders  o Physician Telephone Evaluation, 11-20 minutes ([id number]) - V44.6/Z93.6, 997.5/N99.528, 591/N13.30 - 01/05/2021  · Medications  o Medications have been Reconciled  o Transition of Care or Provider Policy  · Instructions  o Electronically Identified Patient Education Materials Provided Electronically       Ileoureteral anastomotic stricture    Patient high risk for abdominal surgery, we will plan on switching her ureteral stent every 3 to 4 months based on amount of calcifications.  Patient will come in last week of this month to get this scheduled.    Patient continues to have a lot of pain, she thinks it is right over her InterStim generator.  She would like it removed.  We did discuss that this would have added risk to the procedure and I will have her come in for an exam  and also discuss this    We will send a urine culture that day             Electronically Signed by: Ridge Frye MD -Author on January 5, 2021 11:42:05 AM

## 2021-05-14 NOTE — PROGRESS NOTES
Progress Note      Patient Name: Breanna Smith   Patient ID: [id number]   Sex: Female   YOB: 1960    Primary Care Provider: Ligia Andrade APRN    Visit Date: January 25, 2021    Provider: Ridge Frye MD   Location: [facility]   Location Address: [address] [place] [address]  [id number]   Location Phone: [phone]          Chief Complaint  · pt here for urologic issues      History Of Present Illness       60-year-old  female w h/o IC status post cystectomy many years ago with neobladder creation that was converted to ieal conduit.  Patient with right ureteral anastomotic stricture    Patient has a lot of pain over her InterStim.  She cannot lay on it so shallow causes her a lot of pain.  She has not used this since it was placed.    No fevers or chills.  She does have some odorous urine.    deals with chronic back.  Chronic pain where she had a previous surgeries across her lower abdomen    h/o MI.  COPD    ASA 81    1-1/2 pack/day smoker x46 years    Previous    11/4/20 ileoscopy, right single-J 6 [place] ureteral stent placement.  Removal of right nephroureteral catheter    Dr. Le  -high risk for any further abdominal surgeries.      10/5/2020 right nephroureteral catheter placement    has bilateral 7 [place] single J nephroureteral catheters.  The patient states that the last time they were changed was in April or May 2020.  She states that these were placed by her urologist in [place] related to concern for a fistula of some sort?.    compliance issues in the past.    1/20 CT abdomen/pelvis with and without nonobstructing left renal calculi, 4 mm largest.  Severe right-sided hydronephrosis may relate to stricture at the anastomotic site.  2 urostomy tubes terminate in the neobladder rather than ureters.  No hydronephrosis on the left.  Couple punctate nonobstructing calculi within the neobladder.    12/19 urologist in [place] looposcopy,  internalization of bilateral stents    11/19 creatinine 1.7,   GFR  31    Multiple surgeries for parastomal hernia.  Patient is been managed with chronic indwelling catheter drainage from her loop.      2001 InterStim placement - not using.  2001 neobladder converted to ileal conduit  1998 cystectomy with neobladder -  for IC    The patient also has a significant chronic health history including hypertension, recurrent urinary tract infections, chronic small bowel obstruction, anemia, multiple falls, and COPD.    history of severe malnutrition requiring long-term TPN previously.    Her son changes her urostomy bags.  The patient reports that she will need her nephroureteral catheters exchanged every 3 months.    history of urostomy stenosis.    She states she cannot tolerate any antibiotics aside from Levaquin and will not take anything aside from Levaquin for acute infections.    She currently uses [employer] products for her urostomy supplies as she states she can no longer use any other brand as they cause skin breakdown.     no family history of urologic cancers.    Patient has had several abdominal ventral hernia repairs.  She does have mesh.           Past Medical History  Allergies; Anxiety; Arthritis; Bipolar 1 disorder; Bipolar affective disorder; Cataract; Chronic pain; COPD; Depression; Forgetfulness; Gall Stones; Heart Attack; Heart Murmur; Hemorrhoids; Hernia; High blood pressure; High cholesterol; Hydronephrosis; Interstitial cystitis; Kidney stone; Migraine; Psychiatric disorder; Reflux; Seizure; Sinus trouble         Past Surgical History  Caesarian section; Gallbladder removal; Hernia Repair; Interstem Placement; Nephrostomy With Tube Drainage; Urostomy         Medication List  aspirin 81 mg oral tablet,delayed release (DR/EC); cetirizine 10 mg oral tablet; clonazepam 1 mg oral tablet; Combivent Respimat  mcg/actuation inhalation mist; Daily Multi-Vitamin oral tablet; duloxetine 60 mg oral  capsule,delayed release(DR/EC); fenofibrate 160 mg oral tablet; ferrous sulfate 325 mg (65 mg iron) oral tablet; fluticasone propionate 50 mcg/actuation nasal spray,suspension; hydroxyzine HCl 50 mg oral tablet; nortriptyline 75 mg oral capsule; omeprazole 20 mg oral capsule,delayed release(DR/EC); oxycodone-acetaminophen 2.5-300 mg oral tablet; wheelchair with leg rest 0         Allergy List  Aleve; Biaxin; Cipro; clindamycin HCl; Flagyl; gemfibrozil; Keflex; Latuda; PENICILLINS; pravastatin; QUINOLONES; SULFA (SULFONAMIDES); TETRACYCLINES; vancomycin         Family Medical History  Family history of lung cancer; Family history of stroke; Family history of heart disease; Family history of diabetes mellitus; Family history of stomach cancer         Social History  Tobacco (Current every day)         Review of Systems  · Constitutional  o Denies  o : chills, fever  · Gastrointestinal  o Denies  o : nausea, vomiting      Physical Examination  · Constitutional  o Appearance  o : Well-appearing, well-developed, in no acute distress  · Respiratory  o Respiratory Effort  o : Unlabored breathing  · Gastrointestinal  o Abdominal Examination  o : Nontender, nondistended, no rigidity or guarding, no hepatosplenomegaly  · Neurologic  o Mental Status Examination  o :   § Orientation  § : Grossly oriented to person, place and time, judgment and insight intact, normal mood and affect       Ostomy is pink and viable.  Single-J ureteral stent emanating without any issue.  Patient does have palpable mesh under the midline with no signs of erosion.           Assessment  · Nephrostomy complication     997.5/N99.528  · Presence of urostomy     V44.6/Z93.6  · Hydronephrosis     591/N13.30      Plan  · Orders  o Urine Culture (Cath) [facility] ([id number]) - 997.5/N99.528, 591/N13.30 - 01/25/2021  · Medications  o Medications have been Reconciled  o Transition of Care or Provider Policy  · Instructions  o Electronically Identified Patient Education  Materials Provided Electronically       Patient having a lot of pain with her InterStim.  Not using it anymore want to remove.  We have discussed this several times I will get her set up for InterStim removal.  We will also plan on right ureteral stent exchange possible ileoscopy.  Risks and benefits were discussed including bleeding, infection and damage to the urinary system.  We also discussed the risk of anesthesia up to and including death.  Patient voiced understanding and would like to proceed.     EKG, urine culture today    Patient will only take Levaquin so we will see what her urine culture shows, I told her she may have to do something different if her urine culture is resistant to Levaquin.                 Electronically Signed by: Ridge Frye MD -Author on January 25, 2021 04:17:06 PM

## 2021-06-05 NOTE — H&P
History and Physical      Patient Name: Breanna Smith   Patient ID: [id number]   Sex: Female   YOB: 1960    Primary Care Provider: Ligia Andrade APRN    Visit Date: May 7, 2021    Provider: Ridge Frye MD   Location: [facility]   Location Address: [address] [place] [address]  [id number]   Location Phone: [phone]          Chief Complaint  · Outpatient History & Physical / Surgical Orders      History Of Present Illness  [facility]  Outpatient History and Physical Surgical Orders  Preadmission Location: Phone Preadmission Time: 09:00 AM   Which Facility: [facility] Surgery Date: 06/23/2021 Preadmission Testing Date: 06/18/2021   Patient's Name: Breanna Smith YOB: 1960   Chief complaint/history present illness: Hydronephrosis   Current Medication List: aspirin 81 mg oral tablet,delayed release (DR/EC), cetirizine 10 mg oral tablet, clonazepam 1 mg oral tablet, Combivent Respimat  mcg/actuation inhalation mist, Daily Multi-Vitamin oral tablet, duloxetine 60 mg oral capsule,delayed release(DR/EC), fenofibrate 160 mg oral tablet, ferrous sulfate 325 mg (65 mg iron) oral tablet, fluticasone propionate 50 mcg/actuation nasal spray,suspension, hydroxyzine HCl 50 mg oral tablet, nortriptyline 75 mg oral capsule, omeprazole 20 mg oral capsule,delayed release(DR/EC), and wheelchair with leg rest 0   Allergies: Aleve, Biaxin, Cipro, clindamycin HCl, Flagyl, gemfibrozil, Keflex, Latuda, oxycodone-acetaminophen, PENICILLINS, pravastatin, QUINOLONES, SULFA (SULFONAMIDES), TETRACYCLINES, and vancomycin   Significant past medical: Allergies, Anxiety, Arthritis, Bipolar 1 disorder, Bipolar affective disorder, Cataract, Chronic pain, COPD, Depression, Forgetfulness, Gall Stones, Heart Attack, Heart Murmur, Hemorrhoids, Hernia, High blood pressure, High cholesterol, Hydronephrosis, Interstitial cystitis, Kidney stone,  Migraine, Pap smear for cervical cancer screening, Psychiatric disorder, Reflux, Screening for colon cancer, Seizure, and Sinus trouble   Past Surgical History: Caesarian section, Gallbladder removal, Hernia Repair, Interstem Placement, Nephrostomy With Tube Drainage, and Urostomy   Examination of heart and lungs: Regular rate, rhythm, no murmur, gallop, rub, Breath sounds normal, no distress, and Abdomen soft, non-tender, BSx4 are positive         Past Medical History  Allergies; Anxiety; Arthritis; Bipolar 1 disorder; Bipolar affective disorder; Cataract; Chronic pain; COPD; Depression; Forgetfulness; Gall Stones; Heart Attack; Heart Murmur; Hemorrhoids; Hernia; High blood pressure; High cholesterol; Hydronephrosis; Interstitial cystitis; Kidney stone; Migraine; Pap smear for cervical cancer screening; Psychiatric disorder; Reflux; Screening for colon cancer; Seizure; Sinus trouble         Past Surgical History  Caesarian section; Gallbladder removal; Hernia Repair; Interstem Placement; Nephrostomy With Tube Drainage; Urostomy         Medication List  aspirin 81 mg oral tablet,delayed release (DR/EC); cetirizine 10 mg oral tablet; clonazepam 1 mg oral tablet; Combivent Respimat  mcg/actuation inhalation mist; Daily Multi-Vitamin oral tablet; duloxetine 60 mg oral capsule,delayed release(DR/EC); fenofibrate 160 mg oral tablet; ferrous sulfate 325 mg (65 mg iron) oral tablet; fluticasone propionate 50 mcg/actuation nasal spray,suspension; hydroxyzine HCl 50 mg oral tablet; levofloxacin 250 mg; levofloxacin 500 mg oral tablet; nortriptyline 75 mg oral capsule; omeprazole 20 mg oral capsule,delayed release(DR/EC); wheelchair with leg rest 0         Allergy List  Aleve; Biaxin; Cipro; clindamycin HCl; Flagyl; gemfibrozil; Keflex; Latuda; oxycodone-acetaminophen; PENICILLINS; pravastatin; QUINOLONES; SULFA (SULFONAMIDES); TETRACYCLINES; vancomycin         Family Medical History  Family history of lung cancer;  "Family history of stroke; Family history of heart disease; Family history of diabetes mellitus; Family history of stomach cancer         Social History  Tobacco (Current every day)         Vitals  Date Time BP Position Site L\R Cuff Size HR RR TEMP (F) WT  HT  BMI kg/m2 BSA m2 O2 Sat FR L/min FiO2 HC       05/07/2021 09:39 AM       17  126lbs 16oz 5'  4\" 21.8 1.61                 Assessment  · Pre-Surgical Orders     V72.84  · Heart attack     410.90/I21.9  · Preop testing     V72.84/Z01.818      Plan  · Orders  o General Urology Surgery Order ([facility]) - V72.84 - 05/27/2021  o EKG (Recording only) (11249) - 410.90/I21.9 - 05/07/2021  o [facility] Pre-Op Covid-19 Screening (30686) - V72.84/Z01.818 - 05/21/2021   1030  o [facility] Pre-Op Covid-19 Screening (50012) - V72.84/Z01.818 - 06/18/2021   11:10AM  · Medications  o Medications have been Reconciled  o Transition of Care or Provider Policy  · Instructions  o *****Surgical Orders******  o Pre-Operative Orders: Sign permit for Ileoscopy; Right ureteral stent exchange  o Outpatient   o Levaquin 250 mg IV OCTOR.  o RISK AND BENEFITS:  o Possible risks/complications, benefits and alternatives to surgical or invasive procedure have been explained to patient and/or legal guardian.  o Electronically Identified Patient Education Materials Provided Electronically            Electronically Signed by: Ridge Frye MD -Author on May 26, 2021 01:32:22 PM  "

## 2021-06-06 NOTE — PROGRESS NOTES
Progress Note      Patient Name: Breanna Smith   Patient ID: [id number]   Sex: Female   YOB: 1960    Primary Care Provider: Ligia Andrade APRN    Visit Date: May 7, 2021    Provider: Ridge Frye MD   Location: [facility]   Location Address: [address] [place] [address]  [id number]   Location Phone: [phone]          Chief Complaint  · pt here for urologic issues      History Of Present Illness       60-year-old  female w h/o IC status post cystectomy many years ago with neobladder creation that was converted to ieal conduit.  Patient with right ureteral anastomotic stricture    2/24/21 removal of InterStim, removal of right nephrostomy tube with placement of 6 Cambodian single J ureteral stent on the right side.    Patient is doing better after InterStim removal pain is improved in that area.  She is dealing with some pneumonia currently.  She is getting better she is pretty much finished her antibiotics    h/o MI.  COPD    ASA 81    1-1/2 pack/day smoker x46 years    Patient is still having a lot of pain with her hernias, she thinks no one around the urostomy is getting larger.  Bothersome to the patient and bothering her quite a lot      Previous    11/4/20 ileoscopy, right single-J 6 Cambodian ureteral stent placement.  Removal of right nephroureteral catheter    Dr. Le / urologic oncology-high risk for any further abdominal surgeries.      10/5/2020 right nephroureteral catheter placement    has bilateral 7 Cambodian single J nephroureteral catheters.  The patient states that the last time they were changed was in April or May 2020.  She states that these were placed by her urologist in [place] related to concern for a fistula of some sort?.    compliance issues in the past.    1/20 CT abdomen/pelvis with and without nonobstructing left renal calculi, 4 mm largest.  Severe right-sided hydronephrosis may relate to stricture at the anastomotic  site.  2 urostomy tubes terminate in the neobladder rather than ureters.  No hydronephrosis on the left.  Couple punctate nonobstructing calculi within the neobladder.    12/19 urologist in [place] looposcopy, internalization of bilateral stents    11/19 creatinine 1.7,   GFR  31    Multiple surgeries for parastomal hernia.  Patient is been managed with chronic indwelling catheter drainage from her loop.      2001 InterStim placement - not using.  2001 neobladder converted to ileal conduit  1998 cystectomy with neobladder -  for IC    The patient also has a significant chronic health history including hypertension, recurrent urinary tract infections, chronic small bowel obstruction, anemia, multiple falls, and COPD.    history of severe malnutrition requiring long-term TPN previously.    Her son changes her urostomy bags.  The patient reports that she will need her nephroureteral catheters exchanged every 3 months.    history of urostomy stenosis.    She states she cannot tolerate any antibiotics aside from Levaquin and will not take anything aside from Levaquin for acute infections.    She currently uses [employer] products for her urostomy supplies as she states she can no longer use any other brand as they cause skin breakdown.     no family history of urologic cancers.    Patient has had several abdominal ventral hernia repairs.  She does have mesh.           Past Medical History  Allergies; Anxiety; Arthritis; Bipolar 1 disorder; Bipolar affective disorder; Cataract; Chronic pain; COPD; Depression; Forgetfulness; Gall Stones; Heart Attack; Heart Murmur; Hemorrhoids; Hernia; High blood pressure; High cholesterol; Hydronephrosis; Interstitial cystitis; Kidney stone; Migraine; Pap smear for cervical cancer screening; Psychiatric disorder; Reflux; Screening for colon cancer; Seizure; Sinus trouble         Past Surgical History  Caesarian section; Gallbladder removal; Hernia Repair; Interstem Placement; Nephrostomy  "With Tube Drainage; Urostomy         Medication List  aspirin 81 mg oral tablet,delayed release (DR/EC); cetirizine 10 mg oral tablet; clonazepam 1 mg oral tablet; Combivent Respimat  mcg/actuation inhalation mist; Daily Multi-Vitamin oral tablet; duloxetine 60 mg oral capsule,delayed release(DR/EC); fenofibrate 160 mg oral tablet; ferrous sulfate 325 mg (65 mg iron) oral tablet; fluticasone propionate 50 mcg/actuation nasal spray,suspension; hydroxyzine HCl 50 mg oral tablet; levofloxacin 250 mg; levofloxacin 500 mg oral tablet; nortriptyline 75 mg oral capsule; omeprazole 20 mg oral capsule,delayed release(DR/EC); wheelchair with leg rest 0         Allergy List  Aleve; Biaxin; Cipro; clindamycin HCl; Flagyl; gemfibrozil; Keflex; Latuda; oxycodone-acetaminophen; PENICILLINS; pravastatin; QUINOLONES; SULFA (SULFONAMIDES); TETRACYCLINES; vancomycin       Allergies Reconciled  Family Medical History  Family history of lung cancer; Family history of stroke; Family history of heart disease; Family history of diabetes mellitus; Family history of stomach cancer         Social History  Tobacco (Current every day)         Review of Systems  · Constitutional  o Denies  o : chills, fever  · Gastrointestinal  o Denies  o : nausea, vomiting      Vitals  Date Time BP Position Site L\R Cuff Size HR RR TEMP (F) WT  HT  BMI kg/m2 BSA m2 O2 Sat FR L/min FiO2        05/07/2021 09:39 AM       17  126lbs 16oz 5'  4\" 21.8 1.61             Physical Examination  · Constitutional  o Appearance  o : Well-appearing, well-developed, in no acute distress  · Respiratory  o Respiratory Effort  o : Unlabored breathing  · Cardiovascular  o Heart  o :   § Auscultation of Heart  § : Regular rate and rhythm, no murmurs  · Gastrointestinal  o Abdominal Examination  o : Nontender, nondistended, no rigidity or guarding, no hepatosplenomegaly  · Neurologic  o Mental Status Examination  o :   § Orientation  § : Grossly oriented to person, place " and time, judgment and insight intact, normal mood and affect       Large anterior abdominal wall parastomal hernia containing bowel surrounding her urostomy               Assessment  · Nephrostomy complication     997.5/N99.528  · Presence of urostomy     V44.6/Z93.6  · Hydronephrosis     591/N13.30  · Parastomal hernia     569.69/K43.5      Plan  · Medications  o Medications have been Reconciled  o Transition of Care or Provider Policy  · Instructions  o Electronically Identified Patient Education Materials Provided Electronically     Ileoureteral anastomotic stricture    Patient is stent dependent she comes in today to get scheduled for stent exchange we will get her scheduled for ileoscopy with right single-J ureteral stent exchange in the OR.  Risks and benefits were discussed including bleeding, infection and damage to the urinary system.  We also discussed the risk of anesthesia up to and including death.  Patient voiced understanding and would like to proceed.       Start levofloxacin 3 days for procedure levofloxacin 500x1 then 250x2.  Risk/benefits and side effects discussed.    Parastomal hernia    Patient wants to talk about her hernia today.  Is bothering her a lot and causing more pain.  She does think it is getting larger.  We have sent her to the [facility] to discuss this and they have said she was too high risk for surgery on this hernia.  I did discuss with her that I could send her to [place] in [place] to discuss this hernia for continues to bother her.  We did discuss she is very high risk for recurrence and more problems, patient understands this, but she would like to be seen I will get her a referral             Electronically Signed by: Ridge Frye MD -Author on May 7, 2021 10:51:22 AM

## 2021-06-06 NOTE — PROGRESS NOTES
Progress Note      Patient Name: Breanna Smith   Patient ID: [id number]   Sex: Female   YOB: 1960    Primary Care Provider: Ligia KUNZ    Visit Date: May 6, 2021    Provider: Will Jaeger MD   Location: [facility]   Location Address: [address] [place] [address], [address] [place] [address]  [id number]   Location Phone: [phone]          Chief Complaint     3 month f/u on Anxiety, Depression, DANGELO and chronic conditions       History Of Present Illness  Breanna Smith is a 60 year old /White female who presents for evaluation and treatment of:      60 years old female with past medical history of CKD, hydronephrosis, ileal conduit, s/p ureteral stent, chronic interstitial cystitis, iron deficiency anemia, anxiety depression, chronic pain, multiple abdominal hernia comes to the clinic to follow-up on chronic conditions.    Urology notes and procedure notes reviewed    Continue follow-up with you with urology/surgery specialist as instructed.    Multiple abdominal hernia; not a good candidate for surgery, patient was evaluated by surgical team  We will.    Patient denies any active pain, any acute chest pain or shortness of breath at this time.       Past Medical History  Disease Name Date Onset Notes   Allergies --  --    Anxiety --  --    Arthritis --  --    Bipolar 1 disorder --  --    Bipolar affective disorder --  --    Cataract --  --    Chronic pain --  --    COPD --  --    Depression --  --    Forgetfulness --  --    Gall Stones --  --    Heart Attack --  --    Heart Murmur --  --    Hemorrhoids --  --    Hernia --  --    High blood pressure --  --    High cholesterol --  --    Hydronephrosis --  --    Interstitial cystitis --  --    Kidney stone --  --    Migraine --  --    Psychiatric disorder --  --    Reflux --  --    Seizure --  --    Sinus trouble --  --          Past Surgical History  Procedure Name Date Notes   Caesarian section --  --     Gallbladder removal --  --    Hernia Repair --  --    Interstem Placement --  --    Nephrostomy With Tube Drainage --  --    Urostomy --  --          Medication List  Name Date Started Instructions   aspirin 81 mg oral tablet,delayed release (DR/EC) 01/27/2021 take 1 tablet (81 mg) by oral route once daily for 30 days   cetirizine 10 mg oral tablet 03/30/2021 take 1 tablet by oral route once a day (at bedtime) for 30 days   clonazepam 1 mg oral tablet 04/30/2021 take 1 tablet by oral route three times a day as needed   Combivent Respimat  mcg/actuation inhalation mist 08/21/2020 inhale 1 puff by inhalation route 4 times per day ; may take additional puffs as needed not to exceed 6 puffs in 24hrs for 30 days   Daily Multi-Vitamin oral tablet  take 1 tablet by oral route daily   duloxetine 60 mg oral capsule,delayed release(DR/EC) 12/07/2020 take 2 capsules (120 mg) by oral route once daily for 90 days   fenofibrate 160 mg oral tablet 01/29/2021 take 1 tablet (160 mg) by oral route once daily for 30 days   ferrous sulfate 325 mg (65 mg iron) oral tablet 02/02/2021 take 1 tablet (325 mg) by oral route once daily for 30 days   fluticasone propionate 50 mcg/actuation nasal spray,suspension 08/20/2020 inhale 1 puff by nasal route 2 times a day for 30 days   hydroxyzine HCl 50 mg oral tablet 01/14/2021 take 1 tablet by oral route once a day (at bedtime) for 90 days   nortriptyline 75 mg oral capsule 12/07/2020 take 2 capsules (150 mg) by oral route once daily for 90 days   omeprazole 20 mg oral capsule,delayed release(DR/EC) 02/02/2021 take 1 capsule (20 mg) by oral route once daily 30 minutes to 1 hour before a meal for 30 days   wheelchair with leg rest 0 09/03/2020 dx: vertigo R42, unsteady gait R26.81, frequent falls R29.6         Allergy List  Allergen Name Date Reaction Notes   Aleve --  --  --    Biaxin --  --  --    Cipro --  --  --    clindamycin HCl --  --  --    Flagyl --  --  --    gemfibrozil --  --   "--    Keflex --  --  --    Latuda --  --  --    oxycodone-acetaminophen Mar 25 2021 12:00AM hives --    PENICILLINS --  --  --    pravastatin --  myalgia --    QUINOLONES --  --  --    SULFA (SULFONAMIDES) --  --  --    TETRACYCLINES --  --  --    vancomycin --  --  --          Family Medical History  Disease Name Relative/Age Notes   Family history of lung cancer  --    Family history of stroke Father/   --    Family history of heart disease Father/  Mother/   --    Family history of diabetes mellitus  --    Family history of stomach cancer Father/   --          Social History  Finding Status Start/Stop Quantity Notes   Tobacco Current every day --/-- 1.5 ppd 01/14/2021 -          Review of Systems  · Constitutional  o Denies  o : fatigue, fever  · Eyes  o Denies  o : discharge from eye, redness  · HENT  o Denies  o : headaches, congestion  · Cardiovascular  o Denies  o : chest pain, palpitations  · Respiratory  o Denies  o : shortness of breath, wheezing, cough  · Gastrointestinal  o Denies  o : vomiting, diarrhea, constipation  · Genitourinary  o Denies  o : dysuria, hematuria  · Integument  o Denies  o : rash, new skin lesions  · Neurologic  o Denies  o : altered mental status, seizures  · Musculoskeletal  o Denies  o : weakness, joint swelling  · Psychiatric  o Denies  o : anxiety, depression      Vitals  Date Time BP Position Site L\R Cuff Size HR RR TEMP (F) WT  HT  BMI kg/m2 BSA m2 O2 Sat FR L/min FiO2 HC       05/06/2021 09:31 /60 Sitting    88 - R 16 97.6 127lbs 5oz 5'  4\" 21.85 1.61 96 %  21%          Physical Examination  · Constitutional  o Appearance  o : alert, in no acute distress, well developed, well-nourished  · Head and Face  o Head  o : normocephalic, atraumatic, non tender, no palpable masses or nodules.  o Face  o : no facial lesions  · Eyes  o Vision  o : Acuity: grossly normal at distance, Conjuntivae: Normal, Sclerae white, Pupils: PERRL, Cornea: Clear, no lesions " bilateral  · Neck  o Inspection/Palpation  o : Supple, no masses or tenderness, no deformities, Trachea: Midline, ROM: with in normal limits, no neck stiffness  o Thyroid  o : no thyomegaly, no palpabale masses   · Respiratory  o Auscultation of Lungs  o : normal breath sounds throughout  · Cardiovascular  o Heart  o : Regular rate and rhythm, Normal S1,S2 , No cardiac murmers, No S3 or S4 gallop or rubs  · Gastrointestinal  o Abdominal Examination  o : Urostomy bag seen which is clean/dry and intact, no tenderness but abdominal hernias appreciated  · Neurologic  o Mental Status Examination  o : alert and oriented to time, place, and person., Cranial Nerves:   · Psychiatric  o Mood and Affect  o : normal mood and affect          Assessment  · Post menopausal syndrome     V49.81/Z78.0  · Anxiety disorder     300.00/F41.9  · Depression     311/F32.9  · Fatigue     780.79/R53.83  · Nicotine dependence     305.1/F17.200  · DANGELO (iron deficiency anemia)     280.9/D50.9  · Smoker     305.1/F17.200  · Hydronephrosis     591/N13.30  · CKD (chronic kidney disease)     585.9/N18.9  · S/P ureteral stent placement     V45.89/Z96.0  · Chronic pain     338.29/G89.29  · Abdominal hernia     553.9/K46.9       I have reviewed urology recommendations and procedure notes, patient is not a good candidate for surgical interventions as per urology/surgical team.    Current medications reviewed with patient, patient does not want any colonoscopy or see any GI at this time.  Continue with urology and nephrology.    We will do the repeat blood work to follow-up.       Plan  · Orders  o DEXA Bone Density, 1 or more sites, axial skeleton [facility] (58131) - V49.81/Z78.0 - 05/06/2021  o Female Fatigue Panel (CBC, CMP, TSH, B12) [facility] (49157, 34766, 74751, 71179) - 780.79/R53.83 - 05/06/2021  o Iron panel (iron, TIBC, transferrin saturation) (68353, 26739, 22467) - 780.79/R53.83 - 05/06/2021  o Smoking cessation counseling, 3-10 minutes [facility] (09217) -  305.1/F17.200 - 05/06/2021  o ACO-17: Screened for tobacco use AND received tobacco cessation intervention (4004F) - 305.1/F17.200 - 05/06/2021  o Low dose CT scan (LDCT) for lung cancer screening [facility] () - 305.1/F17.200 - 05/06/2021  o Hgb A1c [facility] (59761) - 305.1/F17.200, 591/N13.30, 585.9/N18.9, 780.79/R53.83 - 05/06/2021  o Lipid Panel [facility] (88966) - 585.9/N18.9, 338.29/G89.29, 553.9/K46.9, 780.79/R53.83 - 05/06/2021  o ACO-14: Influenza immunization was not administered for reasons documented [facility] () - - 05/06/2021  o ACO-39: Current medications updated and reviewed (, 1159F) - - 05/06/2021  o Ferritin (09740) - 280.9/D50.9 - 05/06/2021  · Medications  o Medications have been Reconciled  o Transition of Care or Provider Policy  · Instructions  o *Form of nicotine being used:   o Patient was strongly encouraged to discontinue use of any nicotine containing product or minimize the use of the product.  o Discussed smoking cessation and counseling with patient for over 3 minutes.  o Patient was educated/instructed on their diagnosis, treatment and medications prior to discharge from the clinic today.  o Patient counseled to reduce calorie intake.  o Patient was instructed to exercise regularly.  o Patient instructed to seek medical attention urgently for new or worsening symptoms.  o Call the office with any concerns or questions.  o Bring all medicines with their bottles to each office visit.  o Minutes spent with patient including greater than 50% in Education/Counseling/Care Coordination.  o Time spent with the patient was minutes, more than 50% face to face.  o Discussed Covid-19 precautions including, but not limited to, social distancing, avoid touching your face, and hand washing.   · Disposition  o Call or Return if symptoms worsen or persist.  o Follow Up PRN.  o Follow Up in 3 months.            Electronically Signed by: Will Jaeger MD -Author on May 6, 2021 10:17:08 AM

## 2021-06-08 NOTE — TELEPHONE ENCOUNTER
Patient said she is returning a call to find out the referral information to [facility].

## 2021-06-09 NOTE — TELEPHONE ENCOUNTER
Spoke with pt and gave her all appt info from [facility]. Pt read back and verified info and verbalized understanding.

## 2021-06-22 NOTE — PRE-PROCEDURE INSTRUCTIONS
Medication to take am of procedure:    OMEPRAZOLE, CETIRIZINE, FENOFIBRATE, KLONOPIN, IRON, NOSE SPRAY IF NEEDED, HYDROCODONE IF NEEDED, REPORTED HAD INHALERS ADVISED TO USE AND BRING WITH BUT PT REPORTED THAT THEY WERE NOT WORKING RIGHT    IMPORTANT INSTRUCTIONS - PRE-ADMISSION TESTING  1. DO NOT EAT OR CHEW anything after midnight the night before your procedure.    2. You may have CLEAR liquids up to ____2__ hours prior to ARRIVAL time.   3. Take the following medications the morning of your procedure with JUST A SIP OF WATER:  ________SEE ABOVE______________________________________________________________________________________________________________________________________________________________________________    4. DO NOT BRING your medications to the hospital with you, UNLESS something has changed since your PRE-Admission Testing appointment.  5. Hold all vitamins, supplements, and NSAIDS (Non- steroidal anti-inflammatory meds) for one week prior to surgery (you MAY take Tylenol or Acetaminophen).  6. If you are diabetic, check your blood sugar the morning of your procedure. If it is less than 70 or if you are feeling symptomatic, call the following number for further instructions: [phone]  7. Use your inhalers/nebulizers as usual, the morning of your procedure. BRING YOUR INHALERS with you.   8. Bring your CPAP or BIPAP to hospital, ONLY IF YOU WILL BE SPENDING THE NIGHT.   9. Make sure you have a ride home and have someone who will stay with you the day of your procedure after you go home.  10. If you have any questions, please call your Pre-Admission Testing Nurse, BENITO________________ at [phone]________.   11. Per anesthesia request, do not smoke for 24 hours before your procedure or as instructed by your surgeon.    12. NO JEWELRY OR NAIL POLISH AM OF PROCEDURE

## 2021-06-23 PROBLEM — N39.0 RECURRENT URINARY TRACT INFECTION: Status: ACTIVE | Noted: 2021-01-01

## 2021-06-23 PROBLEM — N13.5 URETERAL STRICTURE, RIGHT: Status: ACTIVE | Noted: 2021-01-01

## 2021-06-23 PROBLEM — N39.0 RECURRENT URINARY TRACT INFECTION: Status: RESOLVED | Noted: 2021-01-01 | Resolved: 2021-01-01

## 2021-06-23 NOTE — ANESTHESIA POSTPROCEDURE EVALUATION
Patient: Breanna Smith    Procedure Summary     Date: 06/23/21 Room / Location: [place] [address] / [facility] MAIN OR    Anesthesia Start: 0748 Anesthesia Stop: 0815    Procedure: RIGHT URETERAL STENT EXCHANGE (Right ) Diagnosis:       Hydronephrosis      (Hydronephrosis)    Surgeons: Ridge Frye MD Provider: Darrius Hoang MD    Anesthesia Type: MAC ASA Status: 4          Anesthesia Type: MAC    Vitals  Vitals Value Taken Time   BP 97/38 06/23/21 0816   Temp 36.9 °C (98.4 °F) 06/23/21 0815   Pulse 77 06/23/21 0819   Resp 20 06/23/21 0815   SpO2 98 % 06/23/21 0819   Vitals shown include unvalidated device data.        Post Anesthesia Care and Evaluation    Patient location during evaluation: bedside  Patient participation: complete - patient participated  Level of consciousness: awake  Pain score: 0  Pain management: adequate  Airway patency: patent  Anesthetic complications: No anesthetic complications  PONV Status: none  Cardiovascular status: acceptable and stable  Respiratory status: acceptable and room air  Hydration status: acceptable

## 2021-06-23 NOTE — H&P
[facility]   UROLOGY HISTORY AND PHYSICAL    Patient Name: Breanna Smith  : 1960  MRN: [id number]  Primary Care Physician:  Will Jaeger MD  Date of admission: 2021    Subjective   Subjective     Chief Complaint: Need for single-J ureteral stent    HPI: 60-year-old female history of distant cystectomy with chronic right ilio ureteral anastomotic stricture need for right stent chronically    Patient here today for her stent exchange, no changes to her medical history, she has taken her levofloxacin.    Review of Systems     10 systems reviewed and are negative other than what is listed in HPI    Personal History     Past Medical History:   Diagnosis Date   • Acid reflux    • Allergies    • Anxiety    • Arthritis    • Bipolar 1 disorder (CMS/HCC)    • Bipolar affective disorder (CMS/HCC)    • Cataract    • Chronic pain     BACK AND HIP   • Chronic pain     FOLLOWED BY PAIN MANAGEMENT   • CKD (chronic kidney disease)    • COPD (chronic obstructive pulmonary disease) (CMS/HCC)    • Depression    • Forgetfulness    • Heart attack (CMS/HCC)     REPORTED HAD MI OVER 20 OR 30 YEARS AGO. DENIES CP BUT GETS SOA WITH EXERTION . DOES NOT FOLLOW WITH CARDILOGIST   • Heart murmur     DENIES ANY ISSUES   • Hemorrhoids    • Hernia cerebri (CMS/HCC)    • Hernia of abdominal cavity    • History of removal of ureteral stent    • Hydronephrosis    • Interstitial cystitis     CHRONIC   • Kidney stone    • Migraine    • Psychiatric disorder    • Seizure (CMS/[facility])     REPORTS THAT WHEN SHE WAS A CHILD   • Sinus trouble        Past Surgical History:   Procedure Laterality Date   •  SECTION     • CHOLECYSTECTOMY     • HERNIA REPAIR     • HERNIA REPAIR      REPORTS HAS HAD MULTIPLE HERNIA SURGERIES   • HYSTERECTOMY     • NEPHROSTOMY      WITH TUBE DRAINAGE   • OTHER SURGICAL HISTORY      UROSTOMY   • OTHER SURGICAL HISTORY      INTERSTEM PLACEMENT       Family History: family history includes Diabetes in an  other family member; Heart disease in her father and mother; Lung cancer in an other family member; Stomach cancer in her father; Stroke in her father. Otherwise pertinent FHx was reviewed and not pertinent to current issue.    Social History:  reports that she has been smoking cigarettes. She has been smoking about 1.50 packs per day. She has never used smokeless tobacco. She reports that she does not drink alcohol and does not use drugs.    Home Medications:  DULoxetine, HYDROcodone-acetaminophen, albuterol sulfate HFA, aspirin, cetirizine, clonazePAM, fenofibrate, ferrous sulfate, fluticasone, hydrOXYzine, ipratropium-albuterol, multivitamin with minerals, nortriptyline, and omeprazole      Allergies:  Allergies   Allergen Reactions   • Azithromycin Itching, Rash, Shortness Of Breath and Nausea And Vomiting   • Clarithromycin Itching, Rash, Shortness Of Breath and Nausea And Vomiting   • Penicillins Itching, Rash, Anaphylaxis, Unknown - High Severity and Nausea And Vomiting   • Lurasidone Hcl Itching   • Metronidazole GI Intolerance   • Pravastatin Myalgia   • Sulfa Antibiotics Hives   • Adhesive Tape Rash   • Cephalexin Rash   • Cephalosporins Rash   • Ciprofloxacin Rash   • Clindamycin Hcl Rash   • Gemfibrozil Rash   • Naproxen Rash   • Nitrofurantoin Rash   • Oxycodone-Acetaminophen Rash   • Quinolones Rash   • Tetracyclines & Related Itching and Rash   • Vancomycin Rash       Objective   Objective     Vitals:      Physical Exam    Constitutional: Awake, alert   Eyes: PERRLA, sclerae anicteric, no conjunctival injection   HENT: NCAT, mucous membranes moist   Neck: Supple, no thyromegaly, no lymphadenopathy, trachea midline   Respiratory: Clear to auscultation bilaterally, nonlabored respirations    Cardiovascular: RRR, no murmurs, rubs, or gallops, palpable pedal pulses bilaterally   Gastrointestinal: Positive bowel sounds, soft, nontender, nondistended   Musculoskeletal: No bilateral ankle edema, no clubbing  or cyanosis to extremities   Psychiatric: Appropriate affect, cooperative   Neurologic: Oriented x 3, strength symmetric in all extremities, Cranial Nerves grossly intact to confrontation, speech clear   Skin: No rashes     Result Review    Result Review:  I have personally reviewed the results from the time of this admission to 6/23/2021 06:53 EDT and agree with these findings:  []  Laboratory  []  Microbiology  []  Radiology  []  EKG/Telemetry   []  Cardiology/Vascular   []  Pathology  []  Old records  []  Other:    Assessment/Plan   Assessment / Plan     Brief Patient Summary:  Breanna Smith is a 60 y.o. female     Ilio ureteral anastomotic stricture, need for chronic right single-J ureteral stent      Active Hospital Problems:  There are no active hospital problems to display for this patient.      Plan:     Here today for possible ileoscopy with right single-J ureteral stent exchange.  Risks and benefits were discussed including bleeding, infection and damage to the urinary system.  We also discussed the risk of anesthesia up to and including death.  Patient voiced understanding and would like to proceed.    Electronically signed by Ridge Frye MD, 06/23/21, 6:53 AM EDT.

## 2021-06-23 NOTE — ANESTHESIA PREPROCEDURE EVALUATION
Anesthesia Evaluation     Patient summary reviewed and Nursing notes reviewed   NPO Solid Status: > 6 hours  NPO Liquid Status: > 6 hours           Airway   Mallampati: II  TM distance: >3 FB  Neck ROM: full  Dental - normal exam     Pulmonary    (+) COPD moderate, rhonchi, decreased breath sounds,   Cardiovascular     ECG reviewed  Rhythm: regular  Rate: normal    (+) valvular problems/murmurs, past MI ,       Neuro/Psych  (+) seizures, psychiatric history,     GI/Hepatic/Renal/Endo    (+)  GERD,  renal disease,     Musculoskeletal     Abdominal    Substance History      OB/GYN          Other   arthritis,                      Anesthesia Plan    ASA 4     MAC     intravenous induction     Anesthetic plan, all risks, benefits, and alternatives have been provided, discussed and informed consent has been obtained with: patient.

## 2021-06-23 NOTE — OP NOTE
CYSTOSCOPY URETERAL CATHETER/STENT INSERTION  Procedure Report    Patient Name:  Breanna Smith  YOB: 1960    Date of Surgery:  6/23/2021      Pre-op Diagnosis:       Right ileoureteral anastomotic stricture, chronic       Postop diagnosis:    Same    Procedure/CPT® Codes:      Procedure(s):  RIGHT URETERAL STENT EXCHANGE, 6 Portuguese single J    Staff:  Surgeon(s):  Ridge Frye MD         Anesthesia: General    Estimated Blood Loss: none    Implants:    Nothing was implanted during the procedure    Specimen:          None        Findings: none    Complications: none    Description of Procedure: After informed consent patient taken to the operating room.  Patient was laid supine and placed under general anesthesia by the anesthesia team.  A multidisciplinary timeout was undertaken documenting the correct patient site and procedure.     Prepped and draped in normal sterile fashion.  Stoma and stent were prepped also    Stiff wire was used to advance up the single-J ureteral stent of the right ureter without issue.  This went up into the renal pelvis on the right side without problem.  The old stent was removed and passed off the field.    New 6 Portuguese single J stent was advanced over the wire under fluoroscopic guidance and wire was removed a good curl was in the right renal pelvis under fluoroscopy.  Stent was coming straight down out the ileal conduit without issue.    New ostomy appliance was applied     Patient tolerated the procedure well, he was taken to the postanesthesia care unit without issue.          Ridge Frye MD     Date: 6/23/2021  Time: 08:04 EDT

## 2021-06-23 NOTE — DISCHARGE INSTRUCTIONS
DISCHARGE INSTRUCTIONS CYSTOSCOPY      ? For your surgery you had:  ? General anesthesia (you may have a sore throat for the first 24 hours)  ? IV sedation  ? Local anesthesia  ? Monitored anesthesia care  ? You received a medicated patch for nausea prevention today (behind your ear). It is recommended that you remove it 24-48 hours post-operatively. It must be removed within 72 hours.  ? You have received an anesthesia medication today that can cause hormonal forms of birth control to be ineffective. You should use a different form of birth control (to prevent pregnancy) for 7 days.   ? You may experience dizziness, drowsiness, or lightheadedness for several hours following surgery.  ? Do not stay alone today or tonight.  ? Limit your activity for 24 hours.  ? You should not drive, operate machinery, drink alcohol, or sign legally binding documents for 24 hours or while you are taking pain medication.  ? Resume your diet slowly.  Follow any special dietary instructions you may have been given by your doctor.  Last dose of pain medication given at:   .  NOTIFY YOUR DOCTOR IF YOU EXPERIENCE ANY OF THE FOLLOWING:  ? Temperature greater than 101 degrees Fahrenheit  ? Shaking Chills  ? Redness or excessive drainage from incision  ? Nausea, vomiting and/or pain that is not controlled by prescribed medications  ? Increase in bleeding or bleeding that is excessive  ? Unable to urinate in 6 hours after surgery  ? If unable to reach your doctor, please go to the closest Emergency Room   ? Following your cystoscopy exam, you may experience burning upon urination.  You may also pass some bloody urine.  If the burning sensation and/or bloody urine should persist beyond 48 hours, call your doctor.  ? To encourage kidney and bladder function, you should drink as much fluid as possible.  ? If you have difficulty urinating, try sitting in a bath tub of warm water.  If you become uncomfortable because you cannot urinate, call your  doctor or come to the Emergency Room at the hospital.  ? Medications per physician instructions as indicated on Discharge Medication Information Sheet.  ? You should see   for follow-up care  on   .  Phone number:        SPECIAL INSTRUCTIONS:

## 2021-07-07 NOTE — TELEPHONE ENCOUNTER
Data: [facility]: 2020  7:32 PM.  Patient admitted for induction for postdates. Patient is a .  Prenatal record reviewed. Pregnancy has been uncomplicated..  Gestational Age 41w0d. VSS. Fetal movement present. Patient denies uterine contractions, leaking of vaginal fluid/rupture of membranes, vaginal bleeding, abdominal pain, nausea, vomiting, headache, visual disturbances, epigastric or URQ pain, significant edema. Support person is present.   Action: Verbal consent for EFM.  Admission assessment completed. Bill of rights reviewed.  Response: Patient verbalized agreement with plan. Will contact Dr Madison Felton with update and further orders.   Patient needs a refill.  Patient did not show for last appointment and said that they would call us for an appointment.  Pt never called.  I called patient and left a voicemail to schedule an appointment so we can assess and refill her meds.

## 2021-07-14 NOTE — PROGRESS NOTES
"Subjective   Breanna Smith is a 60 y.o. female who presents today for follow up    Referring Provider:  No referring provider defined for this encounter.    Chief Complaint:  mdd marleni cluster C pd    History of Present Illness:     Breanna Smith is a 60 year old /White female who presents to the office today referred by Will Jaeger MD.     Chart: 59F, hx of right hydronephrosis/bilateral ureter stents/right ureter catheter and bipolar disorder. On clonazepam 1 mg tid, duloxetine 120 mg daily, nortriptyline 150 daily. 9/2020: Cr 1.64, Hct 27.6 low, K 3.4 low. No EKG, head imaging. MARLENI-7 score November 2 is 19, severe. PHQ 9 score is 24, severe.  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  Also history of hernias, CKD, MI 20 years ago.    7/15: right ureteral stent exchanged 6/23.  Complications.  June labs show creatinine 1.70, anemia. Right ileoureteral anastomotic stricture, chronic.  EKG in May shows rate 79, sinus, .    In person interview: Note I cannot stand where I am living.\"    · No major changes.   · Continues to have problems with her son, Johnny's, ex-girlfriend, and her daughters, who are 14 and 16 years old.  Every other word is \"F you.\"    · They recently had an argument over a flea cup.  The patient has a flea cut out to capture the fleas from the 8 dogs in the house.  It was felt by one of the daughters, presumably, and they denied it.  Her son took her side this time, at least.    · Verna's 18-year-old daughter recently moved out, along with her boyfriend, 2 daughters, and snake.  Patient does not like snakes.    · No SI HI AVH.   · Has not set up psychotherapy.  · Declines medication changes.          PHQ-9 Depression Screening  PHQ-9 Total Score: 21    Little interest or pleasure in doing things? 1   Feeling down, depressed, or hopeless? 3   Trouble falling or staying asleep, or sleeping too much? 3   Feeling tired or having little " energy? 3   Poor appetite or overeating? 1   Feeling bad about yourself - or that you are a failure or have let yourself or your family down? 3   Trouble concentrating on things, such as reading the newspaper or watching television? 2   Moving or speaking so slowly that other people could have noticed? Or the opposite - being so fidgety or restless that you have been moving around a lot more than usual? 3   Thoughts that you would be better off dead, or of hurting yourself in some way? 2   PHQ-9 Total Score 21     MARLENI-7  Feeling nervous, anxious or on edge: Nearly every day  Not being able to stop or control worrying: Nearly every day  Worrying too much about different things: Nearly every day  Trouble Relaxing: Nearly every day  Being so restless that it is hard to sit still: Nearly every day  Feeling afraid as if something awful might happen: Nearly every day  Becoming easily annoyed or irritable: Nearly every day  MARLENI 7 Total Score: 21  If you checked any problems, how difficult have these problems made it for you to do your work, take care of things at home, or get along with other people: Extremely difficult    Past Surgical History:  Past Surgical History:   Procedure Laterality Date   •  SECTION     • CHOLECYSTECTOMY     • CYSTOSCOPY W/ URETERAL STENT PLACEMENT Right 2021    Procedure: RIGHT URETERAL STENT EXCHANGE;  Surgeon: Ridge Frye MD;  Location: [facility];  Service: Urology;  Laterality: Right;   • HERNIA REPAIR     • HERNIA REPAIR      REPORTS HAS HAD MULTIPLE HERNIA SURGERIES   • HYSTERECTOMY     • NEPHROSTOMY      WITH TUBE DRAINAGE   • OTHER SURGICAL HISTORY      UROSTOMY   • OTHER SURGICAL HISTORY      INTERSTEM PLACEMENT       Problem List:  Patient Active Problem List   Diagnosis   • Ureteral stricture, right   • Menopausal and postmenopausal disorder   • Forgetfulness   • Normal gynecologic examination   • Pap smear for cervical cancer screening   • Colon cancer  screening   • Abdominal hernia   • Abnormal findings on microbiological examination of urine   • Abnormal gait   • Acute urinary tract infection   • Anxiety   • Arthritis   • Benign neoplasm of colon   • Bronchopneumonia   • Cataract   • Chronic abdominal pain   • Chronic pain disorder   • Degeneration of lumbar intervertebral disc   • Chronic interstitial cystitis   • Bipolar 1 disorder (CMS/HCC)   • Depressive disorder   • Diarrhea   • Dysesthesia   • Dysphagia   • Encounter for long-term (current) use of insulin (CMS/[facility])   • Encounter for immunization   • Gastroesophageal reflux disease without esophagitis   • Esophageal reflux   • Family history of diabetes mellitus   • Family history of alcoholism   • Family history of cardiovascular disease   • Family history of mental disorder   • Family history of stroke   • Family history of suicide   • Gall stones   • Heart attack (CMS/[facility])   • Hidradenitis suppurativa   • Heart murmur   • Hemangioma   • Hemorrhoids   • High blood pressure   • Hypertensive disorder   • Pure hypercholesterolemia   • High cholesterol   • History of drug abuse (CMS/[facility])   • History of hernia repair   • History of hysterectomy   • Hydronephrosis   • Kidney stone   • Major depression, single episode   • Migraine   • Nicotine dependence   • Other acute sinusitis   • Chronic obstructive pulmonary disease (CMS/[facility])   • Other specified chronic obstructive airways disease   • Ovarian failure   • Psychiatric disorder   • Pure hypertriglyceridemia   • Seizure (CMS/[facility])   • Sinus trouble   • Skin sensation disturbance   • Smoker   • Transient visual loss       Allergy:   Allergies   Allergen Reactions   • Azithromycin Itching, Rash, Shortness Of Breath and Nausea And Vomiting   • Clarithromycin Itching, Rash, Shortness Of Breath and Nausea And Vomiting   • Penicillins Itching, Rash, Anaphylaxis, Unknown - High Severity and Nausea And Vomiting   • Lurasidone Hcl Itching   • Metronidazole GI Intolerance    • Pravastatin Myalgia   • Sulfa Antibiotics Hives   • Adhesive Tape Rash   • Cephalexin Rash   • Cephalosporins Rash   • Ciprofloxacin Rash   • Clindamycin Hcl Rash   • Gemfibrozil Rash   • Naproxen Rash   • Nitrofurantoin Rash   • Oxycodone-Acetaminophen Rash   • Quinolones Rash   • Tetracyclines & Related Itching and Rash   • Vancomycin Rash        Discontinued Medications:  Medications Discontinued During This Encounter   Medication Reason   • hydrOXYzine (ATARAX) 50 MG tablet Reorder       Current Medications:   Current Outpatient Medications   Medication Sig Dispense Refill   • aspirin (aspirin) 81 MG EC tablet Take 81 mg by mouth Daily. TAKES FOR HEART HEALTH PER PATIENT. REPORTED THAT HAD NOT TAKEN ANY THIS MONTH JUST STOPPED ON OWN     • cetirizine (zyrTEC) 10 MG tablet Take 10 mg by mouth Every Night.     • clonazePAM (KlonoPIN) 1 MG tablet Take 1 mg by mouth 3 (Three) Times a Day.     • DULoxetine (CYMBALTA) 60 MG capsule Take 120 mg by mouth Every Night. TAKE 2 TABS     • fenofibrate 160 MG tablet Take 160 mg by mouth Every Morning.     • ferrous sulfate 325 (65 FE) MG tablet Take 325 mg by mouth Daily.     • fluticasone (FLONASE) 50 MCG/ACT nasal spray 2 sprays into the nostril(s) as directed by provider Daily As Needed.     • HYDROcodone-acetaminophen (NORCO)  MG per tablet Take 1 tablet by mouth Every 6 (Six) Hours As Needed.     • hydrOXYzine (ATARAX) 50 MG tablet Take 1 tablet by mouth Every Night for 270 days. 90 tablet 2   • ipratropium-albuterol (Combivent Respimat)  MCG/ACT inhaler Inhale 1 puff Every 6 (Six) Hours.     • multivitamin with minerals (MULTIVITAMIN ADULTS PO) Take 1 tablet by mouth Daily.     • nortriptyline (PAMELOR) 75 MG capsule Take 150 mg by mouth Every Night. TAKE 2 TABLETS     • omeprazole (priLOSEC) 20 MG capsule TAKE 1 CAPSULE BY MOUTH ONCE DAILY 30 MINUTES TO  1 HOUR BEFORE A MEAL 90 capsule 0   • ProAir  (90 Base) MCG/ACT inhaler Inhale 1 puff Every 4  (Four) Hours As Needed.     • Ferrous Sulfate Dried (High Potency Iron) 65 MG tablet TAKE 1 TABLET BY MOUTH ONCE DAILY       No current facility-administered medications for this visit.       Past Medical History:  Past Medical History:   Diagnosis Date   • Acid reflux    • Allergies    • Anxiety    • Arthritis    • Bipolar 1 disorder (CMS/[facility])    • Bipolar affective disorder (CMS/[facility])    • Cataract    • Chronic pain     BACK AND HIP   • Chronic pain     FOLLOWED BY PAIN MANAGEMENT   • CKD (chronic kidney disease)    • COPD (chronic obstructive pulmonary disease) (CMS/[facility])    • Depression    • Forgetfulness    • Heart attack (CMS/[facility])     REPORTED HAD MI OVER 20 OR 30 YEARS AGO. DENIES CP BUT GETS SOA WITH EXERTION . DOES NOT FOLLOW WITH CARDILOGIST   • Heart murmur     DENIES ANY ISSUES   • Hemorrhoids    • Hernia cerebri (CMS/[facility])    • Hernia of abdominal cavity    • History of removal of ureteral stent    • Hydronephrosis    • Interstitial cystitis     CHRONIC   • Kidney stone    • Migraine    • Obsessive-compulsive disorder    • Psychiatric disorder    • Seizure (CMS/[facility])     REPORTS THAT WHEN SHE WAS A CHILD   • Sinus trouble    • Suicide attempt (CMS/[facility])          Social History     Socioeconomic History   • Marital status: Single     Spouse name: Not on file   • Number of children: Not on file   • Years of education: Not on file   • Highest education level: Not on file   Tobacco Use   • Smoking status: Current Every Day Smoker     Packs/day: 1.50     Types: Cigarettes   • Smokeless tobacco: Never Used   • Tobacco comment: smoked this am   Vaping Use   • Vaping Use: Never used   Substance and Sexual Activity   • Alcohol use: Never   • Drug use: Never   • Sexual activity: Not Currently         Family History   Problem Relation Age of Onset   • Heart disease Mother    • Stroke Father    • Heart disease Father    • Stomach cancer Father    • Alcohol abuse Father    • Lung cancer Other    • Diabetes Other   "      Mental Status Exam:     · Mental Status Exam  · Appearance  · : Good eye contact, normal street clothes, uses a Rollator, wearing a mask  · Behavior  · : pleasant and cooperative  · Motor  · : Mild psychomotor retardation  · Speech  · : Some latency, otherwise normal rhythm, rate, volume, tone, not hyperverbal, not pressured, normal prosidy  · Mood  · : \"Depressed and anxious\"  · Affect  · : Constricted, slightly depressed  · Thought Content  · : negative suicidal ideations, negative homicidal ideations, negative obsessions  · Perceptions  · : negative auditory hallucinations, negative visual hallucinations  · Thought Process  · : Slightly circumstantial  · Insight/Judgement  · : fair/fair  · Cognition  · : grossly intact today  · Attention  · : intact      Review of Systems:  Review of Systems   Constitutional: Positive for fatigue and fever.   Eyes: Positive for visual disturbance.   Respiratory: Negative for cough and shortness of breath.    Cardiovascular: Positive for leg swelling. Negative for chest pain and palpitations.   Gastrointestinal: Negative for nausea and vomiting.   Endocrine: Negative for cold intolerance and heat intolerance.   Genitourinary: Negative for difficulty urinating.   Musculoskeletal: Positive for joint swelling.   Allergic/Immunologic: Negative for immunocompromised state.   Neurological: Positive for numbness. Negative for dizziness, seizures and speech difficulty.         Physical Exam:  Physical Exam    Vital Signs:   /50   Ht 162.6 cm (64\")   Wt 58.5 kg (129 lb)   BMI 22.14 kg/m²      Lab Results:   Admission on 06/23/2021, Discharged on 06/23/2021   Component Date Value Ref Range Status   • Glucose 06/23/2021 99  65 - 99 mg/dL Final   • BUN 06/23/2021 38* 6 - 20 mg/dL Final   • Creatinine 06/23/2021 1.70* 0.57 - 1.00 mg/dL Final   • Sodium 06/23/2021 137  136 - 145 mmol/L Final   • Potassium 06/23/2021 3.6  3.5 - 5.2 mmol/L Final   • Chloride 06/23/2021 104  98 - " 107 mmol/L Final   • CO2 06/23/2021 23.2  22.0 - 29.0 mmol/L Final   • Calcium 06/23/2021 8.6  8.6 - 10.5 mg/dL Final   • eGFR Non  Amer 06/23/2021 31* >60 mL/min/1.73 Final   • BUN/Creatinine Ratio 06/23/2021 22.4  7.0 - 25.0 Final   • Anion Gap 06/23/2021 9.8  5.0 - 15.0 mmol/L Final   • WBC 06/23/2021 4.53  3.40 - 10.80 10*3/mm3 Final   • RBC 06/23/2021 3.41* 3.77 - 5.28 10*6/mm3 Final   • Hemoglobin 06/23/2021 10.0* 12.0 - 15.9 g/dL Final   • Hematocrit 06/23/2021 31.6* 34.0 - 46.6 % Final   • MCV 06/23/2021 92.7  79.0 - 97.0 fL Final   • MCH 06/23/2021 29.3  26.6 - 33.0 pg Final   • MCHC 06/23/2021 31.6  31.5 - 35.7 g/dL Final   • RDW 06/23/2021 16.2* 12.3 - 15.4 % Final   • RDW-SD 06/23/2021 55.1* 37.0 - 54.0 fl Final   • MPV 06/23/2021 9.7  6.0 - 12.0 fL Final   • Platelets 06/23/2021 160  140 - 450 10*3/mm3 Final   • Neutrophil % 06/23/2021 51.6  42.7 - 76.0 % Final   • Lymphocyte % 06/23/2021 26.5  19.6 - 45.3 % Final   • Monocyte % 06/23/2021 11.9  5.0 - 12.0 % Final   • Eosinophil % 06/23/2021 8.6* 0.3 - 6.2 % Final   • Basophil % 06/23/2021 0.7  0.0 - 1.5 % Final   • Immature Grans % 06/23/2021 0.7* 0.0 - 0.5 % Final   • Neutrophils, Absolute 06/23/2021 2.34  1.70 - 7.00 10*3/mm3 Final   • Lymphocytes, Absolute 06/23/2021 1.20  0.70 - 3.10 10*3/mm3 Final   • Monocytes, Absolute 06/23/2021 0.54  0.10 - 0.90 10*3/mm3 Final   • Eosinophils, Absolute 06/23/2021 0.39  0.00 - 0.40 10*3/mm3 Final   • Basophils, Absolute 06/23/2021 0.03  0.00 - 0.20 10*3/mm3 Final   • Immature Grans, Absolute 06/23/2021 0.03  0.00 - 0.05 10*3/mm3 Final   • nRBC 06/23/2021 0.0  0.0 - 0.2 /100 WBC Final   Lab on 06/18/2021   Component Date Value Ref Range Status   • COVID19 06/18/2021 Not Detected  Not Detected - Ref. Range Final       EKG Results:  No orders to display       Imaging Results:  No Images in the past 120 days found..      Assessment/Plan   Diagnoses and all orders for this visit:    1. Major depressive  disorder, recurrent episode, moderate (CMS/HCC) (Primary)    2. Generalized anxiety disorder    Other orders  -     hydrOXYzine (ATARAX) 50 MG tablet; Take 1 tablet by mouth Every Night for 270 days.  Dispense: 90 tablet; Refill: 2    Presentation most consistent with anxiety disorder unspecified, depressive disorder unspecified.  Patient is not able to provide a compelling history for bipolar disorder, either 1 or 2.    Stable.  Continue medications.    Declines medication changes, even as her depression and anxiety are chronic.      Gave patient a paper copy of the psychotherapy list.  I highlighted [facility], as they also provide case management, which could help the patient in terms of finding a better living situation.  She would like to live alone again if possible.  She was living alone for 11 years before moving in with her son.    Patient does not present with any acute safety concerns.  Denies SI, plan, or intent.    Return in 8 weeks.    Visit Diagnoses:    ICD-10-CM ICD-9-CM   1. Major depressive disorder, recurrent episode, moderate (CMS/HCC)  F33.1 296.32   2. Generalized anxiety disorder  F41.1 300.02       PLAN:  1. Risk Assessment: Risk of self-harm acutely is moderate to high. Risk factors include previous suicide attempt, recent SI, possible access to weapons, some AODA, depressive disorder, anxiety disorder. Protective factors include no present SI, no family history of suicide attempts, healthcare seeking, future orientation in that she is preparing for surgery in 2 days, willingness to engage in care. Risk of self-harm chronically is also moderate to high, but could be further elevated in the event of treatment noncompliance and/or AODA.  2. Safety: No acute safety concerns.  3. Medications:   a. CONTINUE hydroxyzine 50 mg PO QHS for insomnia. Risks, benefits, side effects discussed with patient including GI upset, sedation, dizziness, grogginess the following day,  prolongation of the QTc interval. After discussion of these risks and benefits, the patient voiced understanding and agreed to proceed.   b. CONTINUE duloxetine 120 mg p.o. daily, nortriptyline 150 mg p.o. daily,   c. Klonopin 1 mg p.o. 3 times daily. Patient signed controlled substances education agreement today. Jackie report ordered, it is pending due to manual preparation. Consider switching the patient from duloxetine to a different medication. It will be very difficult to titrate the patient down on Klonopin as she is also been on this chronically.  d. Patient reports she has been on nortriptyline for 30 years.   4. Therapy: 21 minutes of therapy. Patient never received psychotherapy paperwork. Will  at office.  5. Labs/Studies: Still no UDS.  6. Follow Up: 8 weeks.      TREATMENT PLAN/GOALS: Continue supportive psychotherapy efforts and medications as indicated. Treatment and medication options discussed during today's visit. Patient acknowledged and verbally consented to continue with current treatment plan and was educated on the importance of compliance with treatment and follow-up appointments.    MEDICATION ISSUES:  JACKIE reviewed as expected.  Discussed medication options and treatment plan of prescribed medication as well as the risks, benefits, and side effects including potential falls, possible impaired driving and metabolic adversities among others. Patient is agreeable to call the office with any worsening of symptoms or onset of side effects. Patient is agreeable to call 911 or go to the nearest ER should he/she begin having SI/HI. No medication side effects or related complaints today.     MEDS ORDERED DURING VISIT:  New Medications Ordered This Visit   Medications   • hydrOXYzine (ATARAX) 50 MG tablet     Sig: Take 1 tablet by mouth Every Night for 270 days.     Dispense:  90 tablet     Refill:  2       Return in about 2 months (around 9/15/2021).         This document has been  electronically signed by Vincenzo Shay MD  July 15, 2021 11:12 EDT      Part of this note may be an electronic transcription/translation of spoken language to printed text using the Dragon Dictation System.

## 2021-07-14 NOTE — PATIENT INSTRUCTIONS
1.  Please return to clinic at your next scheduled visit.  Contact the clinic ([phone]) at least 24 hours prior in the event you need to cancel.  2.  Do no harm to yourself or others.    3.  Avoid alcohol and drugs.    4.  Take all medications as prescribed.  Please contact the clinic with any concerns. If you are in need of medication refills, please call the clinic at [phone].    5. Should you want to get in touch with your provider, Dr. Vincenzo Shay, please utilize [employer] or contact the office ([phone]), and staff will be able to page Dr. Shay directly.  6.  In the event you have personal crisis, contact the following crisis numbers: Suicide Prevention Hotline 1-476.620.4600; [facility] Helpline 6-202-378-FEDI; [facility] Emergency Room [phone]; text HELLO to 909879; or 831.     SPECIFIC RECOMMENDATIONS:     1.      Medications discussed at this encounter:                   - no changes.     2.      Psychotherapy recommendations: Continue     3.     Return to clinic: 8 weeks

## 2021-07-15 PROBLEM — Z98.890 HISTORY OF HERNIA REPAIR: Status: ACTIVE | Noted: 2021-01-01

## 2021-07-15 PROBLEM — J44.9 OTHER SPECIFIED CHRONIC OBSTRUCTIVE AIRWAYS DISEASE: Status: ACTIVE | Noted: 2021-01-01

## 2021-07-15 PROBLEM — J01.80 OTHER ACUTE SINUSITIS: Status: ACTIVE | Noted: 2021-01-01

## 2021-07-15 PROBLEM — F17.200 SMOKER: Status: ACTIVE | Noted: 2021-01-01

## 2021-07-15 PROBLEM — K46.9 ABDOMINAL HERNIA: Status: ACTIVE | Noted: 2017-05-19

## 2021-07-15 PROBLEM — I10 HIGH BLOOD PRESSURE: Status: ACTIVE | Noted: 2021-01-01

## 2021-07-15 PROBLEM — R10.9 CHRONIC ABDOMINAL PAIN: Status: ACTIVE | Noted: 2021-01-01

## 2021-07-15 PROBLEM — E78.00 HIGH CHOLESTEROL: Status: ACTIVE | Noted: 2021-01-01

## 2021-07-15 PROBLEM — G89.4 CHRONIC PAIN DISORDER: Status: ACTIVE | Noted: 2021-01-01

## 2021-07-15 PROBLEM — N39.0 ACUTE URINARY TRACT INFECTION: Status: ACTIVE | Noted: 2019-06-25

## 2021-07-15 PROBLEM — J34.9 SINUS TROUBLE: Status: ACTIVE | Noted: 2021-01-01

## 2021-07-15 PROBLEM — R01.1 HEART MURMUR: Status: ACTIVE | Noted: 2021-01-01

## 2021-07-15 PROBLEM — Z90.710 HISTORY OF HYSTERECTOMY: Status: ACTIVE | Noted: 2021-01-01

## 2021-07-15 PROBLEM — R56.9 SEIZURE (HCC): Status: ACTIVE | Noted: 2021-01-01

## 2021-07-15 PROBLEM — N20.0 KIDNEY STONE: Status: ACTIVE | Noted: 2021-01-01

## 2021-07-15 PROBLEM — Z23 ENCOUNTER FOR IMMUNIZATION: Status: ACTIVE | Noted: 2021-01-01

## 2021-07-15 PROBLEM — Z87.19 HISTORY OF HERNIA REPAIR: Status: ACTIVE | Noted: 2021-01-01

## 2021-07-15 PROBLEM — G89.29 CHRONIC ABDOMINAL PAIN: Status: ACTIVE | Noted: 2021-01-01

## 2021-07-15 PROBLEM — K21.9 GASTROESOPHAGEAL REFLUX DISEASE WITHOUT ESOPHAGITIS: Status: ACTIVE | Noted: 2021-01-01

## 2021-07-15 PROBLEM — M51.36 DEGENERATION OF LUMBAR INTERVERTEBRAL DISC: Status: ACTIVE | Noted: 2021-01-01

## 2021-07-15 PROBLEM — Z79.4 ENCOUNTER FOR LONG-TERM (CURRENT) USE OF INSULIN (HCC): Status: ACTIVE | Noted: 2020-01-01

## 2021-07-15 PROBLEM — K21.9 ESOPHAGEAL REFLUX: Status: ACTIVE | Noted: 2021-01-01

## 2021-07-15 PROBLEM — H26.9 CATARACT: Status: ACTIVE | Noted: 2021-01-01

## 2021-07-15 PROBLEM — N13.30 HYDRONEPHROSIS: Status: ACTIVE | Noted: 2021-01-01

## 2021-07-15 PROBLEM — F99 PSYCHIATRIC DISORDER: Status: ACTIVE | Noted: 2021-01-01

## 2021-07-15 PROBLEM — Z12.4 PAP SMEAR FOR CERVICAL CANCER SCREENING: Status: ACTIVE | Noted: 2021-01-01

## 2021-07-15 PROBLEM — K80.20 GALL STONES: Status: ACTIVE | Noted: 2021-01-01

## 2021-07-15 PROBLEM — R68.89 FORGETFULNESS: Status: ACTIVE | Noted: 2021-01-01

## 2021-07-15 PROBLEM — Z81.8 FAMILY HISTORY OF SUICIDE: Status: ACTIVE | Noted: 2021-01-01

## 2021-07-15 PROBLEM — F41.9 ANXIETY: Status: ACTIVE | Noted: 2021-01-01

## 2021-07-15 PROBLEM — F31.9 BIPOLAR 1 DISORDER (HCC): Status: ACTIVE | Noted: 2021-01-01

## 2021-07-15 PROBLEM — M19.90 ARTHRITIS: Status: ACTIVE | Noted: 2021-01-01

## 2021-07-15 PROBLEM — Z12.11 COLON CANCER SCREENING: Status: ACTIVE | Noted: 2021-01-01

## 2021-07-15 PROBLEM — F32.9 MAJOR DEPRESSION, SINGLE EPISODE: Status: ACTIVE | Noted: 2021-01-01

## 2021-07-15 PROBLEM — I21.9 HEART ATTACK (HCC): Status: ACTIVE | Noted: 2021-01-01

## 2021-07-15 PROBLEM — E78.00 PURE HYPERCHOLESTEROLEMIA: Status: ACTIVE | Noted: 2021-01-01

## 2021-07-15 PROBLEM — K64.9 HEMORRHOIDS: Status: ACTIVE | Noted: 2021-01-01

## 2021-07-26 PROBLEM — N39.0 URINARY TRACT INFECTION WITHOUT HEMATURIA: Status: ACTIVE | Noted: 2021-01-01

## 2021-07-28 NOTE — OUTREACH NOTE
Prep Survey      Responses   [facility] patient discharged from?  [facility]   Is LACE score < 7 ?  Yes   Emergency Room discharge w/ pulse ox?  No   Eligibility  [facility]   Date of Admission  07/26/21   Date of Discharge  07/28/21   Discharge Disposition  Home or Self Care   Discharge diagnosis  Urinary tract infection without hematuria   Does the patient have one of the following disease processes/diagnoses(primary or secondary)?  Other   Does the patient have Home health ordered?  No   Is there a DME ordered?  No   Prep survey completed?  Yes          Alexandrea Romero RN

## 2021-07-29 NOTE — OUTREACH NOTE
Call Center TCM Note      Responses   [facility] patient discharged from?  [facility]   Does the patient have one of the following disease processes/diagnoses(primary or secondary)?  Other   TCM attempt successful?  No   Unsuccessful attempts  Attempt 2          Litzy Perdue RN    7/29/2021, 13:48 EDT

## 2021-07-30 NOTE — TELEPHONE ENCOUNTER
Val called from [facility].  Needs clarification on an order they received.  Needs to know the name of the patient's ostomy.  The order and p/o  Office note from 02/24/21 are completely different.  The order is for urostomy, and the office note has ileostomy on it. She is faxing the order and the office notes.  If the order is incorrect, Dr. Frye may draw a line through the incorrect one and initial and date it.     Call back number for Val is [phone].

## 2021-07-30 NOTE — TELEPHONE ENCOUNTER
Louie called for pt stating [facility] is calling them and stated they was needing some more information. Stated [facility] has faxed orders to our office. [facility] phone number is [phone]. Fax number is [phone].

## 2021-08-03 NOTE — TELEPHONE ENCOUNTER
Caller: MINDY OCHOA    Relationship: Emergency Contact    Best call back number: [phone]    What was the call regarding: PATIENTS SON WOULD LIKE A CALL BACK AS HE FEELS HIS MOTHER NEEDS TO GO TO A PHYSICAL REHAB SOON DUE TO HER FALLS AND HE WOULD LIKE TO DISCUSS STARTING THIS PROCESS    Do you require a callback: YES

## 2021-08-03 NOTE — TELEPHONE ENCOUNTER
SPOKE WITH PTS SON, HE WILL CALL INSURANCE AND SEE WHAT OPTIONS THERE ARE FOR INPATIENT REHAB FACILITY AND WILL FOLLOW UP WITH US.

## 2021-08-04 NOTE — TELEPHONE ENCOUNTER
Caller: MINDY OCHOA    Relationship: Emergency Contact    Best call back number: [phone]    What is the best time to reach you: ANYTIME    Who are you requesting to speak with (clinical staff, provider,  specific staff member): LAVERNE BHARDWAJ    What was the call regarding: THE PATIENT'S SON STATED HE PREVIOUSLY SPOKE WITH LAVERNE IN REGARDS TO PATIENT NEEDING REHAB. HE IS REQUESTING TO SPEAK WITH HER AGAIN.     Do you require a callback: YES

## 2021-08-11 NOTE — PROGRESS NOTES
"Chief Complaint  Follow-up and discuss concerns  Subjective         Breanna Smith is a 60 y.o. female who presents to [facility]    60 years old female with multiple comorbidities including chronic abdominal pain, anemia, iron deficiency, CKD, hydronephrosis, s/p urostomy, parastomal hernias comes to the clinic today to follow-up on chronic conditions and complaining of gait instability.    Patient is currently following up with urology.    Patient declined all preventive cares including colonoscopy.    Patient is complaining of worsening gait instability.  Patient is a current smoker, reports occasional mild weakness and dizziness.    Patient denies any chest pain or shortness of breath on exertion.    Patient reports she was admitted and diagnosed with acute cystitis for the same symptoms that she is describing today, gait instability/weakness.  Patient had a CT head done with normal findings and discharged home with antibiotics that she finished.    Relative/friend present in the room.   Review of Systems   Objective   Vital Signs:   Vitals:    08/10/21 0957   BP: 98/54   Pulse: 76   Resp: 16   Temp: 97.6 °F (36.4 °C)   SpO2: 96%   Weight: 55.3 kg (122 lb)   Height: 162.6 cm (64\")      Body mass index is 20.94 kg/m².   Physical Exam  Cardiovascular:      Rate and Rhythm: Normal rate and regular rhythm.   Pulmonary:      Effort: Pulmonary effort is normal.      Breath sounds: Normal breath sounds.   Abdominal:      Palpations: Abdomen is soft.      Tenderness: There is no abdominal tenderness.               Assessment and Plan   Diagnoses and all orders for this visit:    1. Acute cystitis without hematuria (Primary)  Comments:  recently treated, No symptoms but requesting to check UCx: pending and will treat if needed   Orders:  -     Cancel: POC Urine Drug Screen [facility]  -     POCT urinalysis dipstick, automated  -     Urine Culture - Urine, Urine, Catheter    2. " Anemia, unspecified type  Comments:  Likely related to chronic conditions, continue with daily iron  Orders:  -     Ambulatory Referral to Nephrology  -     ferrous sulfate 325 (65 FE) MG tablet; Take 1 tablet by mouth Daily.  Dispense: 90 tablet; Refill: 1    3. Stage 3b chronic kidney disease (CMS/HCC)  Comments:  Will refer patient to nephrologist for further evaluation and consider iron infusion.  Patient is already following up with urology  Orders:  -     Ambulatory Referral to Nephrology    4. Gait abnormality  Comments:  Likely due to comorbidities and weakness home health/home PT declined by patient, will order outpatient PT.  Orders:  -     Ambulatory Referral to Physical Therapy    5. Vertigo  -     Ambulatory Referral to Physical Therapy  -     meclizine (ANTIVERT) 12.5 MG tablet; Take 1 tablet by mouth 3 (Three) Times a Day As Needed for Dizziness.  Dispense: 15 tablet; Refill: 0    6. Smoker  Comments:  Smoking cessation discussed    7. Declining mobility    8. Parastomal hernia without obstruction or gangrene    9. History of urostomy    10. Chronic obstructive pulmonary disease, unspecified COPD type (CMS/HCC)    Other orders  -     Cancel: Urine Culture - Urine, Urine, Clean Catch        Other differential diagnosis and possibilities discussed.  Urine dip was reviewed, awaiting for the culture.  Patient to call the clinic if any changes with symptoms, ER precautions discussed.  Patient may use meclizine if needed but I strongly feel that the symptoms are related to her deconditioning and comorbidities.    Follow Up   Return in about 1 week (around 8/17/2021) for Recheck.  Patient was given instructions and counseling regarding her condition or for health maintenance advice. Please see specific information pulled into the AVS if appropriate.

## 2021-08-20 PROBLEM — K43.5 PARASTOMAL HERNIA WITHOUT OBSTRUCTION OR GANGRENE: Status: ACTIVE | Noted: 2021-01-01

## 2021-09-12 PROBLEM — K56.609 SBO (SMALL BOWEL OBSTRUCTION) (HCC): Status: ACTIVE | Noted: 2021-01-01

## 2021-09-21 NOTE — ANESTHESIA PROCEDURE NOTES
Central Line      Patient reassessed immediately prior to procedure    Patient location during procedure: ICU  Start time: 9/21/2021 9:42 AM  Indications: MD/Surgeon request  Staff  CRNA: Alysa Segura CRNA  Preanesthetic Checklist  Completed: patient identified, IV checked, site marked, risks and benefits discussed, surgical consent, monitors and equipment checked, pre-op evaluation and timeout performed  Central Line Prep  Sterile Tech:cap, gloves, mask, sterile barriers and gown  Prep: chloraprep  no medical exclusion documented for following all elements of maximal sterile barrier technique  Patient monitoring: blood pressure monitoring, continuous pulse oximetry and EKG  Central Line Procedure  Laterality:right  Location:femoral  Catheter Type:triple lumen  Catheter Size:6 Fr  Guidance:ultrasound guided and landmark technique  PROCEDURE NOTE/ULTRASOUND INTERPRETATION.  Using ultrasound guidance the potential vascular sites for insertion of the catheter were visualized to determine the patency of the vessel to be used for vascular access.  After selecting the appropriate site for insertion, the needle was visualized under ultrasound being inserted into the femoral vein, followed by ultrasound confirmation of wire and catheter placement. There were no abnormalities seen on ultrasound; an image was taken; and the patient tolerated the procedure with no complications. Images: still images not obtained  Assessment  Post procedure:occlusive dressing applied, line sutured and biopatch applied  Assessement:blood return through all ports, free fluid flow and Ten Test  Complications:no  Patient Tolerance:patient tolerated the procedure well with no apparent complications  Additional Notes  Per Dr Weber's request.

## 2021-09-21 NOTE — ANESTHESIA PROCEDURE NOTES
Arterial Line      Patient reassessed immediately prior to procedure    Patient location during procedure: ICU  Start time: 9/21/2021 9:42 AM   Line placed for hemodynamic monitoring.  Performed By   CRNA: Alysa Segura CRNA  Preanesthetic Checklist  Completed: patient identified, IV checked, site marked, risks and benefits discussed, surgical consent, monitors and equipment checked, pre-op evaluation and timeout performed  Arterial Line Prep   Sterile Tech: cap, gloves and mask  Prep: ChloraPrep  Patient monitoring: blood pressure monitoring, continuous pulse oximetry and EKG  Arterial Line Procedure   Laterality:left  Location:  brachial artery  Catheter size: 20 G   Guidance: ultrasound guided  PROCEDURE NOTE/ULTRASOUND INTERPRETATION.  Using ultrasound guidance the potential vascular sites for insertion of the catheter were visualized to determine the patency of the vessel to be used for vascular access.  After selecting the appropriate site for insertion, the needle was visualized under ultrasound being inserted into the brachial artery, followed by ultrasound confirmation of wire and catheter placement. There were no abnormalities seen on ultrasound; an image was taken; and the patient tolerated the procedure with no complications.   Number of attempts: 2  Successful placement: yes  Post Assessment   Dressing Type: occlusive dressing applied, secured with tape, line sutured and biopatch applied.   Complications no  Circ/Move/Sens Assessment: normal.   Patient Tolerance: patient tolerated the procedure well with no apparent complications  Additional Notes  Per Dr. Weber's request.     Wire intact. Calibrated/Connections checked, line flushed.  Appropriate waveform. Line sutured. Clear occlusive opsite applied over catheter insertion site. Pt remained hemodynamically stable throughout procedure.

## 2021-09-24 LAB
BACTERIA SPEC AEROBE CULT: NORMAL
BACTERIA SPEC AEROBE CULT: NORMAL

## (undated) DEVICE — CYSTO PACK: Brand: MEDLINE INDUSTRIES, INC.

## (undated) DEVICE — Device

## (undated) DEVICE — GLV SURG SENSICARE SLT PF LF 8 STRL

## (undated) DEVICE — CYSTO/BLADDER IRRIGATION SET, REGULATING CLAMP

## (undated) DEVICE — TRY PREP SCRB VAG PVP

## (undated) DEVICE — GOWN,REINFORCE,POLY,SIRUS,BREATH SLV,XLG: Brand: MEDLINE

## (undated) DEVICE — SOL IRRG H2O BG 3000ML STRL

## (undated) DEVICE — GW PTFE/COAT STR/TP STFF/BDY .038 150CM STRL

## (undated) DEVICE — TOWEL,OR,DSP,ST,BLUE,STD,4/PK,20PK/CS: Brand: MEDLINE